# Patient Record
Sex: MALE | Race: WHITE | NOT HISPANIC OR LATINO | Employment: OTHER | URBAN - METROPOLITAN AREA
[De-identification: names, ages, dates, MRNs, and addresses within clinical notes are randomized per-mention and may not be internally consistent; named-entity substitution may affect disease eponyms.]

---

## 2022-07-17 ENCOUNTER — TRANSFERRED RECORDS (OUTPATIENT)
Dept: HEALTH INFORMATION MANAGEMENT | Facility: CLINIC | Age: 52
End: 2022-07-17

## 2022-07-17 ENCOUNTER — MEDICAL CORRESPONDENCE (OUTPATIENT)
Dept: HEALTH INFORMATION MANAGEMENT | Facility: CLINIC | Age: 52
End: 2022-07-17

## 2022-07-18 ENCOUNTER — TRANSFERRED RECORDS (OUTPATIENT)
Dept: HEALTH INFORMATION MANAGEMENT | Facility: CLINIC | Age: 52
End: 2022-07-18

## 2022-07-18 ENCOUNTER — HOSPITAL ENCOUNTER (INPATIENT)
Facility: CLINIC | Age: 52
LOS: 4 days | Discharge: HOME | End: 2022-07-22
Attending: NEUROLOGICAL SURGERY | Admitting: NEUROLOGICAL SURGERY
Payer: COMMERCIAL

## 2022-07-18 DIAGNOSIS — D49.6 BRAIN TUMOR (H): Primary | ICD-10-CM

## 2022-07-18 LAB
ABO/RH(D): NORMAL
ALT SERPL-CCNC: 18 IU/L (ref 7–52)
ANTIBODY SCREEN: NEGATIVE
APTT PPP: 28 SECONDS (ref 22–38)
AST SERPL-CCNC: 18 IU/L (ref 13–39)
CREATININE (EXTERNAL): 0.9 MG/DL (ref 0.7–1.3)
GFR ESTIMATED (EXTERNAL): 88 ML/MIN/1.73M2
GFR ESTIMATED (IF AFRICAN AMERICAN) (EXTERNAL): >90 ML/MIN/1.73M2
GLUCOSE (EXTERNAL): 97 MG/DL (ref 70–105)
INR (EXTERNAL): 1.1 (ref 0.8–1.1)
INR PPP: 1.04 (ref 0.85–1.15)
POTASSIUM (EXTERNAL): 3.9 MEQ/L (ref 3.5–5.1)
SARS-COV-2 RNA RESP QL NAA+PROBE: NEGATIVE
SPECIMEN EXPIRATION DATE: NORMAL
TSH SERPL-ACNC: 2.43 MCIU/ML (ref 0.45–5.33)

## 2022-07-18 PROCEDURE — 85730 THROMBOPLASTIN TIME PARTIAL: CPT | Performed by: STUDENT IN AN ORGANIZED HEALTH CARE EDUCATION/TRAINING PROGRAM

## 2022-07-18 PROCEDURE — 36415 COLL VENOUS BLD VENIPUNCTURE: CPT | Performed by: STUDENT IN AN ORGANIZED HEALTH CARE EDUCATION/TRAINING PROGRAM

## 2022-07-18 PROCEDURE — 250N000013 HC RX MED GY IP 250 OP 250 PS 637: Performed by: STUDENT IN AN ORGANIZED HEALTH CARE EDUCATION/TRAINING PROGRAM

## 2022-07-18 PROCEDURE — 250N000011 HC RX IP 250 OP 636: Performed by: STUDENT IN AN ORGANIZED HEALTH CARE EDUCATION/TRAINING PROGRAM

## 2022-07-18 PROCEDURE — U0003 INFECTIOUS AGENT DETECTION BY NUCLEIC ACID (DNA OR RNA); SEVERE ACUTE RESPIRATORY SYNDROME CORONAVIRUS 2 (SARS-COV-2) (CORONAVIRUS DISEASE [COVID-19]), AMPLIFIED PROBE TECHNIQUE, MAKING USE OF HIGH THROUGHPUT TECHNOLOGIES AS DESCRIBED BY CMS-2020-01-R: HCPCS | Performed by: STUDENT IN AN ORGANIZED HEALTH CARE EDUCATION/TRAINING PROGRAM

## 2022-07-18 PROCEDURE — 120N000002 HC R&B MED SURG/OB UMMC

## 2022-07-18 PROCEDURE — 85610 PROTHROMBIN TIME: CPT | Performed by: STUDENT IN AN ORGANIZED HEALTH CARE EDUCATION/TRAINING PROGRAM

## 2022-07-18 PROCEDURE — 86850 RBC ANTIBODY SCREEN: CPT | Performed by: STUDENT IN AN ORGANIZED HEALTH CARE EDUCATION/TRAINING PROGRAM

## 2022-07-18 RX ORDER — LEVETIRACETAM 500 MG/1
1000 TABLET ORAL 2 TIMES DAILY
Status: DISCONTINUED | OUTPATIENT
Start: 2022-07-18 | End: 2022-07-22 | Stop reason: HOSPADM

## 2022-07-18 RX ORDER — FAMOTIDINE 10 MG
10 TABLET ORAL 2 TIMES DAILY
Status: DISCONTINUED | OUTPATIENT
Start: 2022-07-18 | End: 2022-07-22 | Stop reason: HOSPADM

## 2022-07-18 RX ORDER — DEXAMETHASONE 4 MG/1
4 TABLET ORAL EVERY 8 HOURS SCHEDULED
Status: DISCONTINUED | OUTPATIENT
Start: 2022-07-18 | End: 2022-07-21

## 2022-07-18 RX ADMIN — FAMOTIDINE 10 MG: 10 TABLET, FILM COATED ORAL at 22:11

## 2022-07-18 RX ADMIN — LEVETIRACETAM 1000 MG: 500 TABLET, FILM COATED ORAL at 22:11

## 2022-07-18 RX ADMIN — DEXAMETHASONE 4 MG: 4 TABLET ORAL at 22:11

## 2022-07-18 ASSESSMENT — VISUAL ACUITY: OU: BASELINE

## 2022-07-18 ASSESSMENT — ACTIVITIES OF DAILY LIVING (ADL): ADLS_ACUITY_SCORE: 20

## 2022-07-19 ENCOUNTER — APPOINTMENT (OUTPATIENT)
Dept: MRI IMAGING | Facility: CLINIC | Age: 52
End: 2022-07-19
Attending: NEUROLOGICAL SURGERY
Payer: COMMERCIAL

## 2022-07-19 ENCOUNTER — APPOINTMENT (OUTPATIENT)
Dept: MRI IMAGING | Facility: CLINIC | Age: 52
End: 2022-07-19
Attending: STUDENT IN AN ORGANIZED HEALTH CARE EDUCATION/TRAINING PROGRAM
Payer: COMMERCIAL

## 2022-07-19 ENCOUNTER — ANESTHESIA EVENT (OUTPATIENT)
Dept: SURGERY | Facility: CLINIC | Age: 52
End: 2022-07-19
Payer: COMMERCIAL

## 2022-07-19 LAB
ANION GAP SERPL CALCULATED.3IONS-SCNC: 8 MMOL/L (ref 7–15)
BUN SERPL-MCNC: 17.2 MG/DL (ref 6–20)
CALCIUM SERPL-MCNC: 9 MG/DL (ref 8.6–10)
CHLORIDE SERPL-SCNC: 106 MMOL/L (ref 98–107)
CREAT SERPL-MCNC: 0.82 MG/DL (ref 0.67–1.17)
DEPRECATED HCO3 PLAS-SCNC: 23 MMOL/L (ref 22–29)
ERYTHROCYTE [DISTWIDTH] IN BLOOD BY AUTOMATED COUNT: 13.1 % (ref 10–15)
GFR SERPL CREATININE-BSD FRML MDRD: >90 ML/MIN/1.73M2
GLUCOSE SERPL-MCNC: 143 MG/DL (ref 70–99)
HCT VFR BLD AUTO: 46.2 % (ref 40–53)
HGB BLD-MCNC: 15.2 G/DL (ref 13.3–17.7)
MAGNESIUM SERPL-MCNC: 2.3 MG/DL (ref 1.7–2.3)
MCH RBC QN AUTO: 29.7 PG (ref 26.5–33)
MCHC RBC AUTO-ENTMCNC: 32.9 G/DL (ref 31.5–36.5)
MCV RBC AUTO: 90 FL (ref 78–100)
PHOSPHATE SERPL-MCNC: 2.9 MG/DL (ref 2.5–4.5)
PLATELET # BLD AUTO: 264 10E3/UL (ref 150–450)
POTASSIUM SERPL-SCNC: 4.4 MMOL/L (ref 3.4–5.3)
RBC # BLD AUTO: 5.12 10E6/UL (ref 4.4–5.9)
SODIUM SERPL-SCNC: 137 MMOL/L (ref 136–145)
WBC # BLD AUTO: 10.3 10E3/UL (ref 4–11)

## 2022-07-19 PROCEDURE — 250N000013 HC RX MED GY IP 250 OP 250 PS 637: Performed by: STUDENT IN AN ORGANIZED HEALTH CARE EDUCATION/TRAINING PROGRAM

## 2022-07-19 PROCEDURE — 80048 BASIC METABOLIC PNL TOTAL CA: CPT | Performed by: STUDENT IN AN ORGANIZED HEALTH CARE EDUCATION/TRAINING PROGRAM

## 2022-07-19 PROCEDURE — 83735 ASSAY OF MAGNESIUM: CPT | Performed by: STUDENT IN AN ORGANIZED HEALTH CARE EDUCATION/TRAINING PROGRAM

## 2022-07-19 PROCEDURE — 120N000002 HC R&B MED SURG/OB UMMC

## 2022-07-19 PROCEDURE — 70553 MRI BRAIN STEM W/O & W/DYE: CPT

## 2022-07-19 PROCEDURE — 255N000002 HC RX 255 OP 636: Performed by: NEUROLOGICAL SURGERY

## 2022-07-19 PROCEDURE — 84100 ASSAY OF PHOSPHORUS: CPT | Performed by: STUDENT IN AN ORGANIZED HEALTH CARE EDUCATION/TRAINING PROGRAM

## 2022-07-19 PROCEDURE — 85027 COMPLETE CBC AUTOMATED: CPT | Performed by: STUDENT IN AN ORGANIZED HEALTH CARE EDUCATION/TRAINING PROGRAM

## 2022-07-19 PROCEDURE — 70551 MRI BRAIN STEM W/O DYE: CPT | Mod: 26 | Performed by: RADIOLOGY

## 2022-07-19 PROCEDURE — A9585 GADOBUTROL INJECTION: HCPCS | Performed by: NEUROLOGICAL SURGERY

## 2022-07-19 PROCEDURE — 70551 MRI BRAIN STEM W/O DYE: CPT

## 2022-07-19 PROCEDURE — 70553 MRI BRAIN STEM W/O & W/DYE: CPT | Mod: 26 | Performed by: RADIOLOGY

## 2022-07-19 PROCEDURE — 99232 SBSQ HOSP IP/OBS MODERATE 35: CPT | Performed by: NURSE PRACTITIONER

## 2022-07-19 PROCEDURE — 250N000011 HC RX IP 250 OP 636: Performed by: STUDENT IN AN ORGANIZED HEALTH CARE EDUCATION/TRAINING PROGRAM

## 2022-07-19 PROCEDURE — 36415 COLL VENOUS BLD VENIPUNCTURE: CPT | Performed by: STUDENT IN AN ORGANIZED HEALTH CARE EDUCATION/TRAINING PROGRAM

## 2022-07-19 RX ORDER — GADOBUTROL 604.72 MG/ML
10 INJECTION INTRAVENOUS ONCE
Status: COMPLETED | OUTPATIENT
Start: 2022-07-19 | End: 2022-07-19

## 2022-07-19 RX ADMIN — FAMOTIDINE 10 MG: 10 TABLET, FILM COATED ORAL at 11:14

## 2022-07-19 RX ADMIN — DEXAMETHASONE 4 MG: 4 TABLET ORAL at 06:02

## 2022-07-19 RX ADMIN — GADOBUTROL 10 ML: 604.72 INJECTION INTRAVENOUS at 09:45

## 2022-07-19 RX ADMIN — FAMOTIDINE 10 MG: 10 TABLET, FILM COATED ORAL at 20:49

## 2022-07-19 RX ADMIN — LEVETIRACETAM 1000 MG: 500 TABLET, FILM COATED ORAL at 20:49

## 2022-07-19 RX ADMIN — LEVETIRACETAM 1000 MG: 500 TABLET, FILM COATED ORAL at 11:14

## 2022-07-19 RX ADMIN — DEXAMETHASONE 4 MG: 4 TABLET ORAL at 14:06

## 2022-07-19 RX ADMIN — DEXAMETHASONE 4 MG: 4 TABLET ORAL at 22:12

## 2022-07-19 ASSESSMENT — VISUAL ACUITY
OU: BASELINE
OU: BASELINE

## 2022-07-19 ASSESSMENT — ACTIVITIES OF DAILY LIVING (ADL)
ADLS_ACUITY_SCORE: 20

## 2022-07-19 NOTE — PLAN OF CARE
Goal Outcome Evaluation:      Pt has been alert and oriented X4, slightly slow to respond due to pt being careful with his speech. No word finding difficulty noted today. Neuro intact. Pt has been up in room independently. Tolerated regular diet well. Had brain MRI this am, currently downstairs for follow up MRI to plan for Craniotomy tomorrow. Family members have been at the bedside, very supportive.

## 2022-07-19 NOTE — ANESTHESIA PREPROCEDURE EVALUATION
Anesthesia Pre-Procedure Evaluation    Patient: Laz Stein   MRN: [id number] : 1970        Procedure : Procedure(s):  Stealth assisted  CRANIOTOMY FOR TUMOR RESECTION          52-year-old right-handed male without significant past medical history presenting with 1 week of paraphasic errors with word substitutions, found to have a left contrast-enhancing lesion     Patient reports beginning roughly 1 week ago she began to notice he was having difficulty conveying himself and is often saying inappropriate words and/or switching words for other words that sounded similar although at different remaining.  He reports the symptoms may have been he can a few weeks earlier, however he was attributing those mistakes to old age and nobody in his life had really mentioned that this was to some degree of concern.  Last week he was on the cell phone with inability to convey himself and became very obvious.  He then subsequently talked with his wife about his symptoms he recognized that he come into the ED.  He was seen in [place] where a CT scan was done showing an expansile left parietal occipital lesion.  Subsequent MRI scan was done which showed a multiloculated/cystic large mass to Gadolinium contrast enhancement, He denies any episodes of limb shaking or numbness or paresthesia in any of his extremities.  He denies any focal weakness.  He denies any episodes of staring off, or episodes of confusion/unaccountable time.  He denies any left or right confusion.  He can identify all of his fingers.  He has no issues with additional subtraction/basic arithmetic.  He endorses subjective overall worsening of his handwriting but is able to write his name.  He denies fevers, nausea is conscious chills.    No past medical history on file.   No past surgical history on file.   No Known Allergies   Social History     Tobacco Use     Smoking status: Not on file     Smokeless tobacco: Not on file   Substance Use  Topics     Alcohol use: Not on file      Wt Readings from Last 1 Encounters:   07/19/22 84.9 kg (187 lb 3.2 oz)        Anesthesia Evaluation   Pt has had prior anesthetic. Type: General.    History of anesthetic complications  - PONV.  Severe PONV after both hip surgeries.    ROS/MED HX  ENT/Pulmonary:  - neg pulmonary ROS     Neurologic: Comment: Lesion on brain imaging in left parieto-occipital lesion      Cardiovascular:  - neg cardiovascular ROS  (-) murmur   METS/Exercise Tolerance: >4 METS    Hematologic:  - neg hematologic  ROS     Musculoskeletal:  - neg musculoskeletal ROS     GI/Hepatic:  - neg GI/hepatic ROS     Renal/Genitourinary:  - neg Renal ROS     Endo:  - neg endo ROS     Psychiatric/Substance Use:  - neg psychiatric ROS     Infectious Disease:  - neg infectious disease ROS     Malignancy:  - neg malignancy ROS     Other:            Physical Exam    Airway        Mallampati: III   TM distance: > 3 FB   Neck ROM: full   Mouth opening: > 3 cm    Respiratory Devices and Support         Dental  no notable dental history         Cardiovascular          Rhythm and rate: regular and normal (-) no murmur    Pulmonary           breath sounds clear to auscultation           OUTSIDE LABS:  CBC:   Lab Results   Component Value Date    WBC 10.3 07/19/2022    HGB 15.2 07/19/2022    HCT 46.2 07/19/2022     07/19/2022     BMP:   Lab Results   Component Value Date     07/19/2022    POTASSIUM 4.4 07/19/2022    CHLORIDE 106 07/19/2022    CO2 23 07/19/2022    BUN 17.2 07/19/2022    CR 0.82 07/19/2022     (H) 07/19/2022     COAGS:   Lab Results   Component Value Date    PTT 28 07/18/2022    INR 1.04 07/18/2022     POC: No results found for: BGM, HCG, HCGS  HEPATIC: No results found for: ALBUMIN, PROTTOTAL, ALT, AST, GGT, ALKPHOS, BILITOTAL, BILIDIRECT, TOMAS  OTHER:   Lab Results   Component Value Date    ABDIEL 9.0 07/19/2022    PHOS 2.9 07/19/2022    MAG 2.3 07/19/2022       Anesthesia Plan    ASA  Status:  3   NPO Status:  NPO Appropriate    Anesthesia Type: General.     - Airway: ETT   Induction: Intravenous.   Maintenance: TIVA.   Techniques and Equipment:     - Lines/Monitors: 2nd IV, Arterial Line     - Blood: T&S     Consents    Anesthesia Plan(s) and associated risks, benefits, and realistic alternatives discussed. Questions answered and patient/representative(s) expressed understanding.    - Discussed:     - Discussed with:  Patient      - Extended Intubation/Ventilatory Support Discussed: Yes.      - Patient is DNR/DNI Status: No    Use of blood products discussed: Yes.     - Discussed with: Patient.     - Consented: consented to blood products            Reason for refusal: other.     Postoperative Care    Pain management: IV analgesics, Oral pain medications.   PONV prophylaxis: Ondansetron (or other 5HT-3), Dexamethasone or Solumedrol, Aprepitant     Comments:    Other Comments: Patient seen and examined.  Risks, benefits and alternatives to GETA discussed.  Questions answered and patient wishes to proceed            Yahir Combs MD

## 2022-07-19 NOTE — H&P
[facility]         NEUROSURGERY H&P:      Chief complaint: Left parietal contrast-enhancing mass      HPI:    52-year-old right-handed male without significant past medical history presenting with 1 week of paraphasic errors with word substitutions, found to have a left contrast-enhancing lesion    Patient reports beginning roughly 1 week ago she began to notice he was having difficulty conveying himself and is often saying inappropriate words and/or switching words for other words that sounded similar although at different remaining.  He reports the symptoms may have been he can a few weeks earlier, however he was attributing those mistakes to old age and nobody in his life had really mentioned that this was to some degree of concern.  Last week he was on the cell phone with inability to convey himself and became very obvious.  He then subsequently talked with his wife about his symptoms he recognized that he come into the ED.  He was seen in [place] where a CT scan was done showing an expansile left parietal occipital lesion.  Subsequent MRI scan was done which showed a multiloculated/cystic large mass to Gadolinium contrast enhancement, He denies any episodes of limb shaking or numbness or paresthesia in any of his extremities.  He denies any focal weakness.  He denies any episodes of staring off, or episodes of confusion/unaccountable time.  He denies any left or right confusion.  He can identify all of his fingers.  He has no issues with additional subtraction/basic arithmetic.  He endorses subjective overall worsening of his handwriting but is able to write his name.  He denies fevers, nausea is conscious chills.        No recent fevers, chills, nausea, vomiting, chest pain, shortness of breath, and denies headaches, weakness, LOC, numbness/weakness/paresthesias in extremities, changes in sensation, taste, smell, nor trouble speaking or other neurologic  symptoms.    PAST MEDICAL HISTORY: No past medical history on file.    PAST SURGICAL HISTORY: No past surgical history on file.    FAMILY HISTORY: No family history on file.    SOCIAL HISTORY:   Social History     Tobacco Use     Smoking status: Not on file     Smokeless tobacco: Not on file   Substance Use Topics     Alcohol use: Not on file       MEDICATIONS:  No current outpatient medications on file.       Allergies:  No Known Allergies    ROS: 10 point ROS of systems including Constitutional, Eyes, Respiratory, Cardiovascular, Gastroenterology, Genitourinary, Integumentary, Muscularskeletal, Psychiatric were all negative except for pertinent positives noted in my HPI.    Physical exam:   Blood pressure 127/78, pulse 71, temperature 97.8  F (36.6  C), temperature source Oral, resp. rate 16, SpO2 93 %.  General: Not in acute distress, appearing stated age, laying in bed  HEENT: Normocephalic,  PULM: Breathing comfortably on room air  MSK: Unremarkable    NEUROLOGIC:  -- Awake; Alert; oriented x 3  -- Follows commands briskly  -- +repetition, calculation, and naming  -- Speech fluent, spontaneous. No aphasia or dysarthria.  Mild word substitutions and hesitations.  -- no gaze preference. No apparent hemineglect.  Cranial Nerves:  -- visual fields full to confrontation, PERRL 3-2mm bilat and brisk, extraocular movements intact  -- face symmetrical, tongue midline  -- sensory V1-V3 intact bilaterally  -- palate elevates symmetrically, uvula midline  -- hearing grossly intact bilat  -- Trapezii 5/5 strength bilat symmetric  -- Cerebellar: Finger nose finger without dysmetria, intact rapid alternating motions bilaterally    Motor:  Normal bulk / tone; no tremor, rigidity, or bradykinesia.  No muscle wasting or fasciculations  No Pronator Drift     Delt Bi Tri Hand Flexion/  Extension Iliopsoas Quadriceps Hamstrings Tibialis Anterior Gastroc    C5 C6 C7 C8/T1 L2 L3 L4-S1 L4 S1   R 5 5 5 5 5 5 5 5 5   L 5 5 5 5 5 5 5 5  5     Sensory:  intact to LT x 4 extremities      Reflexes: no clonus       Bi Tri BR Michael Pat Ach Bab     C5-6 C7-8 C6 UMN L2-4 S1 UMN   R 2+ 2+ 2+ Norm 2+ 2+ Norm   L 2+ 2+ 2+ Norm 2+ 2+ Norm      Gait: deferred       IMAGING:  No results found for this or any previous visit (from the past 24 hour(s)).        LABS:   Last Comprehensive Metabolic Panel:  Sodium   Date Value Ref Range Status   07/19/2022 137 136 - 145 mmol/L Final     Potassium   Date Value Ref Range Status   07/19/2022 4.4 3.4 - 5.3 mmol/L Final     Chloride   Date Value Ref Range Status   07/19/2022 106 98 - 107 mmol/L Final     Carbon Dioxide (CO2)   Date Value Ref Range Status   07/19/2022 23 22 - 29 mmol/L Final     Anion Gap   Date Value Ref Range Status   07/19/2022 8 7 - 15 mmol/L Final     Glucose   Date Value Ref Range Status   07/19/2022 143 (H) 70 - 99 mg/dL Final     Urea Nitrogen   Date Value Ref Range Status   07/19/2022 17.2 6.0 - 20.0 mg/dL Final     Creatinine   Date Value Ref Range Status   07/19/2022 0.82 0.67 - 1.17 mg/dL Final     GFR Estimate   Date Value Ref Range Status   07/19/2022 >90 >60 mL/min/1.73m2 Final     Comment:     Effective December 21, 2021 eGFRcr in adults is calculated using the 2021 CKD-EPI creatinine equation which includes age and gender (Ramírez carey al., NEJ, DOI: 10.1056/CLLFto7324918)     Calcium   Date Value Ref Range Status   07/19/2022 9.0 8.6 - 10.0 mg/dL Final     Lab Results   Component Value Date    WBC 10.3 07/19/2022     Lab Results   Component Value Date    RBC 5.12 07/19/2022     Lab Results   Component Value Date    HGB 15.2 07/19/2022     Lab Results   Component Value Date    HCT 46.2 07/19/2022     Lab Results   Component Value Date    MCV 90 07/19/2022     Lab Results   Component Value Date    MCH 29.7 07/19/2022     Lab Results   Component Value Date    MCHC 32.9 07/19/2022     Lab Results   Component Value Date    RDW 13.1 07/19/2022     Lab Results   Component Value Date      07/19/2022     INR   Date Value Ref Range Status   07/18/2022 1.04 0.85 - 1.15 Final    a  aPTT   Date Value Ref Range Status   07/18/2022 28 22 - 38 Seconds Final      @Fibrinogen1@    ASSESSMENT: 52-year-old right-handed male with a large left-sided expansile lesion showing contrast-enhancement.  Exam notable for paraphasic errors.  Differential includes primary CNS neoplasm likely high-grade, metastasis, CNS lymphoma, etc.    Clinically Significant Risk Factors Present on Admission                  # Cerebral edema     RECOMMENDATIONS:  No neurosurgical intervention indicated at this time   MRI brain with and without contrast brain tumor protocol with fiducials  Decadron 4 mg every 8 hours for cerebral edema  Keppra 1 g twice daily for seizure prophylaxis  Tentative plan for OR tomorrow 7/20/2022  Okay for every 4 hour neuro exams  Okay for normotension  Regular diet for now  HOB > 30 degrees  Famotidine for GI prophylaxis while on steroids        Audrey Barajas MD  Neurosurgery Resident, PGY-3    The patient was discussed with Dr. Cheung, neurosurgery chief resident, and Dr. Reese, neurosurgery staff.

## 2022-07-19 NOTE — PHARMACY-ADMISSION MEDICATION HISTORY
Admission Medication History Completed by Pharmacy    See [facility] Admission Navigator for allergy information, preferred outpatient pharmacy, prior to admission medications and immunization status.     Medication History Sources:     Patient Laz Stein    Patient's Spouse Tracey    Changes made to PTA medication list (reason):    Added: None    Deleted: None    Changed: None    Additional Information:    None    Prior to Admission medications    Not on File       Date completed: 07/19/22    Medication history completed by: Sawyer Gonzáles RPH

## 2022-07-19 NOTE — PLAN OF CARE
Arrived from: Outside hosptial ED  Belongings/meds: cell phone, wedding ring   2 RN Skin Assessment Completed by: Power     Non-intact findings documented (yes/no/NA): Small abrasion to R shin    Status: Presented to OSH with expressive aphasia and hand discoordination, imaging showed L parietal mass. Transferred for workup and surgery.   Vitals: VSS on RA  Neuros: Infrequent innapropriate words and word finding difficulty. Otherwise intact.   IV: PIV SL  Labs/Electrolytes: WNL  Resp/trach: LS clear  Diet: Regular, good appetite   Bowel status: LBM 7/18  : Voiding without difficulty   Pain: Denies   Activity: SBA GB   Social: Updating family independently   Plan: High priority MRI this morning, surgery Wednesday 7/20. Continue keppra and decadron.   Updates this shift: MRI checklist sent. Pt unable to remove wedding ring, states that it is titanium.

## 2022-07-19 NOTE — PROGRESS NOTES
[facility], [place]   Neurosurgery Progress Note:    Date of service: 7/19/2022    Assessment: Laz Stein is a 52 year old male who was transferred from outside hospital with one week history of aphasia, found to have new left parietal contrast enhancing lesion.       Clinically Significant Risk Factors Present on Admission                           Plan:  - Neuro checks/vital signs: every 4 hours  - SBP: <160  - Pain control: PRN Oxycodone, tylenol  - Steroids/Keppra: Decadron 4mg every 8 hours, Keppra 1000mg BID  - Activity: up with assistance  - Diet: Regular; NPO @ midnight in anticipation for surgery on 7/20/22  - DVT prophylaxis: SCDs  - Imaging:  MRI brain w/wo contrast planned for this morning   - PT/OT: deferred until after surgery  - Follow-up plan upon discharge:  TBD  - Disposition:  TBD            RAVEN Meraz, CNP  7/19/2022  Department of Neurosurgery  Pager: [phone]    I have spent a total of 25 minutes total time counseling, coordination, and chart review, over 50% of the time was spent in direct patient care.       Interval History:  Patient admitted last evening after imaging revealed a new left parietal lesion.   Patient states the word finding difficulty he had been experiencing has improved, denies headache, vision changes, sensory changes, difficulty writing or math.           Objective:   Temp:  [95.6  F (35.3  C)-97.8  F (36.6  C)] 97.8  F (36.6  C)  Pulse:  [67-71] 71  Resp:  [16] 16  BP: (124-142)/(63-78) 127/78  SpO2:  [93 %-97 %] 93 %  No intake/output data recorded.    Gen: Appears comfortable, NAD  Neurologic:  - Alert & Oriented to person, place, time, and situation  - Follows commands briskly  - Speech fluent, spontaneous. No aphasia or dysarthria.  - No gaze preference. No apparent hemineglect.  - PERRL, EOMI  - Strong eye closure, jaw clench, and cheek puff  - Face symmetric with sensation intact to light touch  - Palate elevates  symmetrically, uvula midline, tongue protrudes midline  - Trapezii and sternocleidomastoid muscles 5/5 bilaterally  - No pronator drift     Del Tr Bi WE WF Gr   R 5 5 5 5 5 5   L 5 5 5 5 5 5    HF KE KF DF PF EHL   R 5 5 5 5 5 5   L 5 5 5 5 5 5     Reflexes 2+ throughout    Sensation intact and symmetric to light touch throughout    LABS  Recent Labs   Lab Test 07/19/22  0709   WBC 10.3   HGB 15.2   MCV 90          Recent Labs   Lab Test 07/19/22  0709      POTASSIUM 4.4   CHLORIDE 106   CO2 23   BUN 17.2   CR 0.82   ANIONGAP 8   ABDIEL 9.0   *       IMAGING    No results found for this or any previous visit (from the past 24 hour(s)).

## 2022-07-20 ENCOUNTER — ANESTHESIA (OUTPATIENT)
Dept: SURGERY | Facility: CLINIC | Age: 52
End: 2022-07-20
Payer: COMMERCIAL

## 2022-07-20 LAB
ABO/RH(D): NORMAL
ANION GAP SERPL CALCULATED.3IONS-SCNC: 11 MMOL/L (ref 7–15)
ANTIBODY SCREEN: NEGATIVE
APTT PPP: 27 SECONDS (ref 22–38)
BASE EXCESS BLDA CALC-SCNC: -1.2 MMOL/L (ref -9.6–2)
BASE EXCESS BLDA CALC-SCNC: -1.8 MMOL/L (ref -9.6–2)
BASE EXCESS BLDA CALC-SCNC: -3.8 MMOL/L (ref -9.6–2)
BUN SERPL-MCNC: 19.2 MG/DL (ref 6–20)
CA-I BLD-MCNC: 4 MG/DL (ref 4.4–5.2)
CA-I BLD-MCNC: 4.4 MG/DL (ref 4.4–5.2)
CA-I BLD-MCNC: 4.4 MG/DL (ref 4.4–5.2)
CALCIUM SERPL-MCNC: 8.9 MG/DL (ref 8.6–10)
CHLORIDE SERPL-SCNC: 104 MMOL/L (ref 98–107)
CREAT SERPL-MCNC: 0.81 MG/DL (ref 0.67–1.17)
DEPRECATED HCO3 PLAS-SCNC: 22 MMOL/L (ref 22–29)
ERYTHROCYTE [DISTWIDTH] IN BLOOD BY AUTOMATED COUNT: 13.2 % (ref 10–15)
GFR SERPL CREATININE-BSD FRML MDRD: >90 ML/MIN/1.73M2
GLUCOSE BLD-MCNC: 104 MG/DL (ref 70–99)
GLUCOSE BLD-MCNC: 116 MG/DL (ref 70–99)
GLUCOSE BLD-MCNC: 117 MG/DL (ref 70–99)
GLUCOSE BLDC GLUCOMTR-MCNC: 116 MG/DL (ref 70–99)
GLUCOSE SERPL-MCNC: 145 MG/DL (ref 70–99)
HCO3 BLDA-SCNC: 21 MMOL/L (ref 21–28)
HCO3 BLDA-SCNC: 25 MMOL/L (ref 21–28)
HCO3 BLDA-SCNC: 25 MMOL/L (ref 21–28)
HCT VFR BLD AUTO: 44.4 % (ref 40–53)
HGB BLD-MCNC: 10.9 G/DL (ref 13.3–17.7)
HGB BLD-MCNC: 12.2 G/DL (ref 13.3–17.7)
HGB BLD-MCNC: 12.4 G/DL (ref 13.3–17.7)
HGB BLD-MCNC: 14.2 G/DL (ref 13.3–17.7)
INR PPP: 1.11 (ref 0.85–1.15)
LACTATE BLD-SCNC: 0.9 MMOL/L
LACTATE BLD-SCNC: 1 MMOL/L
LACTATE BLD-SCNC: 1 MMOL/L
MAGNESIUM SERPL-MCNC: 2.4 MG/DL (ref 1.7–2.3)
MCH RBC QN AUTO: 29 PG (ref 26.5–33)
MCHC RBC AUTO-ENTMCNC: 32 G/DL (ref 31.5–36.5)
MCV RBC AUTO: 91 FL (ref 78–100)
O2/TOTAL GAS SETTING VFR VENT: 40 %
O2/TOTAL GAS SETTING VFR VENT: 45 %
O2/TOTAL GAS SETTING VFR VENT: 45 %
PCO2 BLDA: 37 MM HG (ref 35–45)
PCO2 BLDA: 48 MM HG (ref 35–45)
PCO2 BLDA: 50 MM HG (ref 35–45)
PH BLDA: 7.31 [PH] (ref 7.35–7.45)
PH BLDA: 7.33 [PH] (ref 7.35–7.45)
PH BLDA: 7.37 [PH] (ref 7.35–7.45)
PHOSPHATE SERPL-MCNC: 2.6 MG/DL (ref 2.5–4.5)
PLATELET # BLD AUTO: 246 10E3/UL (ref 150–450)
PO2 BLDA: 199 MM HG (ref 80–105)
PO2 BLDA: 241 MM HG (ref 80–105)
PO2 BLDA: 249 MM HG (ref 80–105)
POTASSIUM BLD-SCNC: 3.3 MMOL/L (ref 3.5–5)
POTASSIUM BLD-SCNC: 4 MMOL/L (ref 3.5–5)
POTASSIUM BLD-SCNC: 4.1 MMOL/L (ref 3.5–5)
POTASSIUM SERPL-SCNC: 4.2 MMOL/L (ref 3.4–5.3)
RBC # BLD AUTO: 4.89 10E6/UL (ref 4.4–5.9)
SODIUM BLD-SCNC: 131 MMOL/L (ref 133–144)
SODIUM BLD-SCNC: 132 MMOL/L (ref 133–144)
SODIUM BLD-SCNC: 135 MMOL/L (ref 133–144)
SODIUM SERPL-SCNC: 137 MMOL/L (ref 136–145)
SPECIMEN EXPIRATION DATE: NORMAL
WBC # BLD AUTO: 15.5 10E3/UL (ref 4–11)

## 2022-07-20 PROCEDURE — 258N000003 HC RX IP 258 OP 636: Performed by: ANESTHESIOLOGY

## 2022-07-20 PROCEDURE — 370N000017 HC ANESTHESIA TECHNICAL FEE, PER MIN: Performed by: NEUROLOGICAL SURGERY

## 2022-07-20 PROCEDURE — 250N000011 HC RX IP 250 OP 636: Performed by: NURSE ANESTHETIST, CERTIFIED REGISTERED

## 2022-07-20 PROCEDURE — 250N000009 HC RX 250: Performed by: NEUROLOGICAL SURGERY

## 2022-07-20 PROCEDURE — 250N000013 HC RX MED GY IP 250 OP 250 PS 637: Performed by: STUDENT IN AN ORGANIZED HEALTH CARE EDUCATION/TRAINING PROGRAM

## 2022-07-20 PROCEDURE — 250N000009 HC RX 250: Performed by: STUDENT IN AN ORGANIZED HEALTH CARE EDUCATION/TRAINING PROGRAM

## 2022-07-20 PROCEDURE — C1713 ANCHOR/SCREW BN/BN,TIS/BN: HCPCS | Performed by: NEUROLOGICAL SURGERY

## 2022-07-20 PROCEDURE — 278N000051 HC OR IMPLANT GENERAL: Performed by: NEUROLOGICAL SURGERY

## 2022-07-20 PROCEDURE — 80048 BASIC METABOLIC PNL TOTAL CA: CPT | Performed by: STUDENT IN AN ORGANIZED HEALTH CARE EDUCATION/TRAINING PROGRAM

## 2022-07-20 PROCEDURE — 250N000011 HC RX IP 250 OP 636: Performed by: ANESTHESIOLOGY

## 2022-07-20 PROCEDURE — 86923 COMPATIBILITY TEST ELECTRIC: CPT | Performed by: NURSE PRACTITIONER

## 2022-07-20 PROCEDURE — 258N000003 HC RX IP 258 OP 636

## 2022-07-20 PROCEDURE — 120N000002 HC R&B MED SURG/OB UMMC

## 2022-07-20 PROCEDURE — 61510 CRNEC TREPH EXC BRN TUM STTL: CPT | Mod: GC | Performed by: NEUROLOGICAL SURGERY

## 2022-07-20 PROCEDURE — 88331 PATH CONSLTJ SURG 1 BLK 1SPC: CPT | Mod: 26 | Performed by: SPECIALIST

## 2022-07-20 PROCEDURE — 258N000003 HC RX IP 258 OP 636: Performed by: NURSE ANESTHETIST, CERTIFIED REGISTERED

## 2022-07-20 PROCEDURE — 88307 TISSUE EXAM BY PATHOLOGIST: CPT | Mod: 26 | Performed by: SPECIALIST

## 2022-07-20 PROCEDURE — 88341 IMHCHEM/IMCYTCHM EA ADD ANTB: CPT | Mod: 26 | Performed by: SPECIALIST

## 2022-07-20 PROCEDURE — 250N000025 HC SEVOFLURANE, PER MIN: Performed by: NEUROLOGICAL SURGERY

## 2022-07-20 PROCEDURE — 86901 BLOOD TYPING SEROLOGIC RH(D): CPT

## 2022-07-20 PROCEDURE — 272N000001 HC OR GENERAL SUPPLY STERILE: Performed by: NEUROLOGICAL SURGERY

## 2022-07-20 PROCEDURE — 8E09XBH COMPUTER ASSISTED PROCEDURE OF HEAD AND NECK REGION, WITH MAGNETIC RESONANCE IMAGING: ICD-10-PCS | Performed by: NEUROLOGICAL SURGERY

## 2022-07-20 PROCEDURE — 61781 SCAN PROC CRANIAL INTRA: CPT | Performed by: NEUROLOGICAL SURGERY

## 2022-07-20 PROCEDURE — 82803 BLOOD GASES ANY COMBINATION: CPT

## 2022-07-20 PROCEDURE — 8E090EM FLUORESCENCE GUIDED PROCEDURE OF HEAD AND NECK REGION USING AMINOLEVULINIC ACID, OPEN APPROACH: ICD-10-PCS | Performed by: NEUROLOGICAL SURGERY

## 2022-07-20 PROCEDURE — 99232 SBSQ HOSP IP/OBS MODERATE 35: CPT | Mod: 57 | Performed by: NURSE PRACTITIONER

## 2022-07-20 PROCEDURE — 999N000141 HC STATISTIC PRE-PROCEDURE NURSING ASSESSMENT: Performed by: NEUROLOGICAL SURGERY

## 2022-07-20 PROCEDURE — 250N000009 HC RX 250: Performed by: ANESTHESIOLOGY

## 2022-07-20 PROCEDURE — 85730 THROMBOPLASTIN TIME PARTIAL: CPT | Performed by: NURSE PRACTITIONER

## 2022-07-20 PROCEDURE — 88360 TUMOR IMMUNOHISTOCHEM/MANUAL: CPT | Mod: 26 | Performed by: SPECIALIST

## 2022-07-20 PROCEDURE — 69990 MICROSURGERY ADD-ON: CPT | Mod: 59 | Performed by: NEUROLOGICAL SURGERY

## 2022-07-20 PROCEDURE — 360N000079 HC SURGERY LEVEL 6, PER MIN: Performed by: NEUROLOGICAL SURGERY

## 2022-07-20 PROCEDURE — 250N000009 HC RX 250: Performed by: NURSE ANESTHETIST, CERTIFIED REGISTERED

## 2022-07-20 PROCEDURE — 88331 PATH CONSLTJ SURG 1 BLK 1SPC: CPT | Mod: TC | Performed by: NEUROLOGICAL SURGERY

## 2022-07-20 PROCEDURE — 83735 ASSAY OF MAGNESIUM: CPT | Performed by: STUDENT IN AN ORGANIZED HEALTH CARE EDUCATION/TRAINING PROGRAM

## 2022-07-20 PROCEDURE — 00B70ZZ EXCISION OF CEREBRAL HEMISPHERE, OPEN APPROACH: ICD-10-PCS | Performed by: NEUROLOGICAL SURGERY

## 2022-07-20 PROCEDURE — 84100 ASSAY OF PHOSPHORUS: CPT | Performed by: STUDENT IN AN ORGANIZED HEALTH CARE EDUCATION/TRAINING PROGRAM

## 2022-07-20 PROCEDURE — 250N000013 HC RX MED GY IP 250 OP 250 PS 637

## 2022-07-20 PROCEDURE — 36415 COLL VENOUS BLD VENIPUNCTURE: CPT

## 2022-07-20 PROCEDURE — 88342 IMHCHEM/IMCYTCHM 1ST ANTB: CPT | Mod: 26 | Performed by: SPECIALIST

## 2022-07-20 PROCEDURE — 710N000011 HC RECOVERY PHASE 1, LEVEL 3, PER MIN: Performed by: NEUROLOGICAL SURGERY

## 2022-07-20 PROCEDURE — 250N000011 HC RX IP 250 OP 636: Performed by: STUDENT IN AN ORGANIZED HEALTH CARE EDUCATION/TRAINING PROGRAM

## 2022-07-20 PROCEDURE — 250N000011 HC RX IP 250 OP 636: Performed by: NURSE PRACTITIONER

## 2022-07-20 PROCEDURE — 85610 PROTHROMBIN TIME: CPT | Performed by: NURSE PRACTITIONER

## 2022-07-20 PROCEDURE — 250N000012 HC RX MED GY IP 250 OP 636 PS 637: Performed by: ANESTHESIOLOGY

## 2022-07-20 PROCEDURE — 85027 COMPLETE CBC AUTOMATED: CPT | Performed by: STUDENT IN AN ORGANIZED HEALTH CARE EDUCATION/TRAINING PROGRAM

## 2022-07-20 DEVICE — DURAGEN® DURAL GRAFT MATRIX 5 PK 3X3 DOM
Type: IMPLANTABLE DEVICE | Site: BRAIN | Status: FUNCTIONAL
Brand: DURAGEN®

## 2022-07-20 DEVICE — IMP PLATE SYN STR DOG BONE LOW PROFILE 2H TI 421.502
Type: IMPLANTABLE DEVICE | Site: CRANIAL | Status: NON-FUNCTIONAL
Removed: 2023-04-19

## 2022-07-20 DEVICE — IMP BUR HOLE COVER 24MM LOW PROFILE TI 421.528
Type: IMPLANTABLE DEVICE | Site: CRANIAL | Status: NON-FUNCTIONAL
Removed: 2023-04-19

## 2022-07-20 DEVICE — IMP SCR SYN MATRIX LOW PRO 1.5X04MM SELF DRILL 04.503.104.01
Type: IMPLANTABLE DEVICE | Site: CRANIAL | Status: NON-FUNCTIONAL
Removed: 2023-04-19

## 2022-07-20 RX ORDER — OXYCODONE HYDROCHLORIDE 5 MG/1
5 TABLET ORAL EVERY 4 HOURS PRN
Status: DISCONTINUED | OUTPATIENT
Start: 2022-07-20 | End: 2022-07-21 | Stop reason: HOSPADM

## 2022-07-20 RX ORDER — LABETALOL HYDROCHLORIDE 5 MG/ML
10 INJECTION, SOLUTION INTRAVENOUS
Status: DISCONTINUED | OUTPATIENT
Start: 2022-07-20 | End: 2022-07-21 | Stop reason: HOSPADM

## 2022-07-20 RX ORDER — HYDRALAZINE HYDROCHLORIDE 20 MG/ML
2.5-5 INJECTION INTRAMUSCULAR; INTRAVENOUS EVERY 10 MIN PRN
Status: DISCONTINUED | OUTPATIENT
Start: 2022-07-20 | End: 2022-07-21 | Stop reason: HOSPADM

## 2022-07-20 RX ORDER — ONDANSETRON 4 MG/1
4 TABLET, ORALLY DISINTEGRATING ORAL EVERY 30 MIN PRN
Status: DISCONTINUED | OUTPATIENT
Start: 2022-07-20 | End: 2022-07-21 | Stop reason: HOSPADM

## 2022-07-20 RX ORDER — LIDOCAINE 40 MG/G
CREAM TOPICAL
Status: DISCONTINUED | OUTPATIENT
Start: 2022-07-20 | End: 2022-07-21

## 2022-07-20 RX ORDER — SODIUM CHLORIDE, SODIUM LACTATE, POTASSIUM CHLORIDE, CALCIUM CHLORIDE 600; 310; 30; 20 MG/100ML; MG/100ML; MG/100ML; MG/100ML
INJECTION, SOLUTION INTRAVENOUS CONTINUOUS
Status: DISCONTINUED | OUTPATIENT
Start: 2022-07-20 | End: 2022-07-21 | Stop reason: HOSPADM

## 2022-07-20 RX ORDER — HYDROMORPHONE HYDROCHLORIDE 1 MG/ML
0.2 INJECTION, SOLUTION INTRAMUSCULAR; INTRAVENOUS; SUBCUTANEOUS EVERY 5 MIN PRN
Status: DISCONTINUED | OUTPATIENT
Start: 2022-07-20 | End: 2022-07-21 | Stop reason: HOSPADM

## 2022-07-20 RX ORDER — LIDOCAINE HYDROCHLORIDE 20 MG/ML
INJECTION, SOLUTION INFILTRATION; PERINEURAL PRN
Status: DISCONTINUED | OUTPATIENT
Start: 2022-07-20 | End: 2022-07-20

## 2022-07-20 RX ORDER — MANNITOL 20 G/100ML
INJECTION, SOLUTION INTRAVENOUS PRN
Status: DISCONTINUED | OUTPATIENT
Start: 2022-07-20 | End: 2022-07-20

## 2022-07-20 RX ORDER — DEXAMETHASONE SODIUM PHOSPHATE 4 MG/ML
INJECTION, SOLUTION INTRA-ARTICULAR; INTRALESIONAL; INTRAMUSCULAR; INTRAVENOUS; SOFT TISSUE PRN
Status: DISCONTINUED | OUTPATIENT
Start: 2022-07-20 | End: 2022-07-20

## 2022-07-20 RX ORDER — EPHEDRINE SULFATE 50 MG/ML
INJECTION, SOLUTION INTRAMUSCULAR; INTRAVENOUS; SUBCUTANEOUS PRN
Status: DISCONTINUED | OUTPATIENT
Start: 2022-07-20 | End: 2022-07-20

## 2022-07-20 RX ORDER — SODIUM CHLORIDE, SODIUM LACTATE, POTASSIUM CHLORIDE, CALCIUM CHLORIDE 600; 310; 30; 20 MG/100ML; MG/100ML; MG/100ML; MG/100ML
INJECTION, SOLUTION INTRAVENOUS CONTINUOUS PRN
Status: DISCONTINUED | OUTPATIENT
Start: 2022-07-20 | End: 2022-07-20

## 2022-07-20 RX ORDER — FENTANYL CITRATE 50 UG/ML
INJECTION, SOLUTION INTRAMUSCULAR; INTRAVENOUS PRN
Status: DISCONTINUED | OUTPATIENT
Start: 2022-07-20 | End: 2022-07-20

## 2022-07-20 RX ORDER — PROPOFOL 10 MG/ML
INJECTION, EMULSION INTRAVENOUS PRN
Status: DISCONTINUED | OUTPATIENT
Start: 2022-07-20 | End: 2022-07-20

## 2022-07-20 RX ORDER — HYDROMORPHONE HYDROCHLORIDE 1 MG/ML
0.4 INJECTION, SOLUTION INTRAMUSCULAR; INTRAVENOUS; SUBCUTANEOUS EVERY 5 MIN PRN
Status: DISCONTINUED | OUTPATIENT
Start: 2022-07-20 | End: 2022-07-21 | Stop reason: HOSPADM

## 2022-07-20 RX ORDER — FENTANYL CITRATE 50 UG/ML
50 INJECTION, SOLUTION INTRAMUSCULAR; INTRAVENOUS EVERY 5 MIN PRN
Status: DISCONTINUED | OUTPATIENT
Start: 2022-07-20 | End: 2022-07-21 | Stop reason: HOSPADM

## 2022-07-20 RX ORDER — BUPIVACAINE HYDROCHLORIDE AND EPINEPHRINE 2.5; 5 MG/ML; UG/ML
INJECTION, SOLUTION INFILTRATION; PERINEURAL PRN
Status: DISCONTINUED | OUTPATIENT
Start: 2022-07-20 | End: 2022-07-20 | Stop reason: HOSPADM

## 2022-07-20 RX ORDER — CEFAZOLIN SODIUM 2 G/100ML
2 INJECTION, SOLUTION INTRAVENOUS SEE ADMIN INSTRUCTIONS
Status: DISCONTINUED | OUTPATIENT
Start: 2022-07-20 | End: 2022-07-21

## 2022-07-20 RX ORDER — ACETAMINOPHEN 500 MG
1000 TABLET ORAL ONCE
Status: DISCONTINUED | OUTPATIENT
Start: 2022-07-20 | End: 2022-07-21 | Stop reason: HOSPADM

## 2022-07-20 RX ORDER — ACETAMINOPHEN 500 MG
1000 TABLET ORAL ONCE
Status: DISCONTINUED | OUTPATIENT
Start: 2022-07-20 | End: 2022-07-20

## 2022-07-20 RX ORDER — DIMENHYDRINATE 50 MG/ML
25 INJECTION, SOLUTION INTRAMUSCULAR; INTRAVENOUS
Status: DISCONTINUED | OUTPATIENT
Start: 2022-07-20 | End: 2022-07-21 | Stop reason: HOSPADM

## 2022-07-20 RX ORDER — APREPITANT 40 MG/1
40 CAPSULE ORAL ONCE
Status: DISCONTINUED | OUTPATIENT
Start: 2022-07-20 | End: 2022-07-20

## 2022-07-20 RX ORDER — ALBUTEROL SULFATE 0.83 MG/ML
2.5 SOLUTION RESPIRATORY (INHALATION) EVERY 4 HOURS PRN
Status: DISCONTINUED | OUTPATIENT
Start: 2022-07-20 | End: 2022-07-21 | Stop reason: HOSPADM

## 2022-07-20 RX ORDER — ONDANSETRON 2 MG/ML
INJECTION INTRAMUSCULAR; INTRAVENOUS PRN
Status: DISCONTINUED | OUTPATIENT
Start: 2022-07-20 | End: 2022-07-20

## 2022-07-20 RX ORDER — CEFAZOLIN SODIUM 2 G/100ML
2 INJECTION, SOLUTION INTRAVENOUS
Status: DISCONTINUED | OUTPATIENT
Start: 2022-07-20 | End: 2022-07-20

## 2022-07-20 RX ORDER — MEPERIDINE HYDROCHLORIDE 25 MG/ML
12.5 INJECTION INTRAMUSCULAR; INTRAVENOUS; SUBCUTANEOUS
Status: DISCONTINUED | OUTPATIENT
Start: 2022-07-20 | End: 2022-07-21 | Stop reason: HOSPADM

## 2022-07-20 RX ORDER — SODIUM CHLORIDE 9 MG/ML
INJECTION, SOLUTION INTRAVENOUS CONTINUOUS
Status: DISCONTINUED | OUTPATIENT
Start: 2022-07-20 | End: 2022-07-21 | Stop reason: HOSPADM

## 2022-07-20 RX ORDER — SODIUM CHLORIDE, SODIUM LACTATE, POTASSIUM CHLORIDE, CALCIUM CHLORIDE 600; 310; 30; 20 MG/100ML; MG/100ML; MG/100ML; MG/100ML
INJECTION, SOLUTION INTRAVENOUS CONTINUOUS
Status: DISCONTINUED | OUTPATIENT
Start: 2022-07-20 | End: 2022-07-21

## 2022-07-20 RX ORDER — PROPOFOL 10 MG/ML
INJECTION, EMULSION INTRAVENOUS CONTINUOUS PRN
Status: DISCONTINUED | OUTPATIENT
Start: 2022-07-20 | End: 2022-07-20

## 2022-07-20 RX ORDER — FENTANYL CITRATE 50 UG/ML
25 INJECTION, SOLUTION INTRAMUSCULAR; INTRAVENOUS
Status: DISCONTINUED | OUTPATIENT
Start: 2022-07-20 | End: 2022-07-21 | Stop reason: HOSPADM

## 2022-07-20 RX ORDER — ONDANSETRON 2 MG/ML
4 INJECTION INTRAMUSCULAR; INTRAVENOUS EVERY 30 MIN PRN
Status: DISCONTINUED | OUTPATIENT
Start: 2022-07-20 | End: 2022-07-21 | Stop reason: HOSPADM

## 2022-07-20 RX ORDER — PHENYLEPHRINE HCL IN 0.9% NACL 50MG/250ML
.1-6 PLASTIC BAG, INJECTION (ML) INTRAVENOUS CONTINUOUS
Status: DISCONTINUED | OUTPATIENT
Start: 2022-07-20 | End: 2022-07-20 | Stop reason: HOSPADM

## 2022-07-20 RX ORDER — ESMOLOL HYDROCHLORIDE 10 MG/ML
INJECTION INTRAVENOUS PRN
Status: DISCONTINUED | OUTPATIENT
Start: 2022-07-20 | End: 2022-07-20

## 2022-07-20 RX ADMIN — PHENYLEPHRINE HYDROCHLORIDE 100 MCG: 10 INJECTION INTRAVENOUS at 20:14

## 2022-07-20 RX ADMIN — ROCURONIUM BROMIDE 50 MG: 50 INJECTION, SOLUTION INTRAVENOUS at 19:23

## 2022-07-20 RX ADMIN — FAMOTIDINE 10 MG: 10 TABLET, FILM COATED ORAL at 07:39

## 2022-07-20 RX ADMIN — DEXAMETHASONE SODIUM PHOSPHATE 10 MG: 4 INJECTION, SOLUTION INTRA-ARTICULAR; INTRALESIONAL; INTRAMUSCULAR; INTRAVENOUS; SOFT TISSUE at 20:11

## 2022-07-20 RX ADMIN — REMIFENTANIL HYDROCHLORIDE 0.08 MCG/KG/MIN: 1 INJECTION, POWDER, LYOPHILIZED, FOR SOLUTION INTRAVENOUS at 19:28

## 2022-07-20 RX ADMIN — PROPOFOL 100 MG: 10 INJECTION, EMULSION INTRAVENOUS at 19:46

## 2022-07-20 RX ADMIN — ONDANSETRON 4 MG: 2 INJECTION INTRAMUSCULAR; INTRAVENOUS at 22:19

## 2022-07-20 RX ADMIN — DEXAMETHASONE 4 MG: 4 TABLET ORAL at 15:06

## 2022-07-20 RX ADMIN — PROPOFOL 150 MCG/KG/MIN: 10 INJECTION, EMULSION INTRAVENOUS at 19:23

## 2022-07-20 RX ADMIN — FENTANYL CITRATE 100 MCG: 50 INJECTION, SOLUTION INTRAMUSCULAR; INTRAVENOUS at 19:20

## 2022-07-20 RX ADMIN — SUGAMMADEX 200 MG: 100 INJECTION, SOLUTION INTRAVENOUS at 22:53

## 2022-07-20 RX ADMIN — Medication 5 MG: at 21:44

## 2022-07-20 RX ADMIN — APREPITANT 40 MG: 40 CAPSULE ORAL at 18:22

## 2022-07-20 RX ADMIN — SODIUM CHLORIDE, POTASSIUM CHLORIDE, SODIUM LACTATE AND CALCIUM CHLORIDE: 600; 310; 30; 20 INJECTION, SOLUTION INTRAVENOUS at 23:20

## 2022-07-20 RX ADMIN — DEXAMETHASONE 4 MG: 4 TABLET ORAL at 05:56

## 2022-07-20 RX ADMIN — ONDANSETRON 4 MG: 2 INJECTION INTRAMUSCULAR; INTRAVENOUS at 19:19

## 2022-07-20 RX ADMIN — PROPOFOL 20 MG: 10 INJECTION, EMULSION INTRAVENOUS at 21:48

## 2022-07-20 RX ADMIN — SODIUM CHLORIDE 20 ML/HR: 234 INJECTION INTRAMUSCULAR; INTRAVENOUS; SUBCUTANEOUS at 20:58

## 2022-07-20 RX ADMIN — LEVETIRACETAM 1000 MG: 500 TABLET, FILM COATED ORAL at 07:39

## 2022-07-20 RX ADMIN — LIDOCAINE HYDROCHLORIDE 100 MG: 20 INJECTION, SOLUTION INFILTRATION; PERINEURAL at 19:21

## 2022-07-20 RX ADMIN — PROPOFOL 200 MG: 10 INJECTION, EMULSION INTRAVENOUS at 19:23

## 2022-07-20 RX ADMIN — SODIUM CHLORIDE, POTASSIUM CHLORIDE, SODIUM LACTATE AND CALCIUM CHLORIDE: 600; 310; 30; 20 INJECTION, SOLUTION INTRAVENOUS at 19:12

## 2022-07-20 RX ADMIN — PHENYLEPHRINE HYDROCHLORIDE 100 MCG: 10 INJECTION INTRAVENOUS at 20:13

## 2022-07-20 RX ADMIN — MANNITOL 175 ML: 20 INJECTION, SOLUTION INTRAVENOUS at 20:14

## 2022-07-20 RX ADMIN — SODIUM CHLORIDE, POTASSIUM CHLORIDE, SODIUM LACTATE AND CALCIUM CHLORIDE: 600; 310; 30; 20 INJECTION, SOLUTION INTRAVENOUS at 19:46

## 2022-07-20 RX ADMIN — Medication 2 G: at 19:30

## 2022-07-20 RX ADMIN — SODIUM CHLORIDE: 9 INJECTION, SOLUTION INTRAVENOUS at 23:57

## 2022-07-20 RX ADMIN — ACETAMINOPHEN 1000 MG: 500 TABLET ORAL at 16:54

## 2022-07-20 RX ADMIN — ESMOLOL HYDROCHLORIDE 50 MG: 10 INJECTION, SOLUTION INTRAVENOUS at 19:46

## 2022-07-20 ASSESSMENT — ACTIVITIES OF DAILY LIVING (ADL)
ADLS_ACUITY_SCORE: 20

## 2022-07-20 NOTE — PLAN OF CARE
Status: transferred from outside hospital with one week history of aphasia, found to have new left parietal contrast enhancing lesion.   Vitals: VSS  Neuros: WFD intermittently.   IV: PIV sl   Labs/Electrolytes: WNL  Diet: Regular. NPO at MN  Bowel status: LBM PTA  : voiding spontaneously   Skin: Intact.   Pain: Denies  Activity: up ad ambrose. Frequent walks around unit.   Social: Wife visted this evening.   Plan: Planning for craniotomy tomorrow in afternoon. Surgical scrub completed.

## 2022-07-20 NOTE — PLAN OF CARE
Pt admitted for new left parietal contrast enhancing lesion. VSS on RA. Neuros intact with exception to intermittent WFD and inappropriate words substituting, not observed this shift. Pre-op scrub completed last evening and this afternoon. NPO since midnight with exception to AM meds. Ambulating independently. SL. Denies pain. Pt send down to pre-op at 1630 with all belongings with family.

## 2022-07-20 NOTE — PROGRESS NOTES
[facility], [place]   Neurosurgery Progress Note:    Date of service: 7/20/2022    Assessment: Laz Stein is a 52 year old male who was transferred from outside hospital with one week history of aphasia, found to have new left parietal contrast enhancing lesion.       Clinically Significant Risk Factors Present on Admission             # Overweight: Estimated body mass index is 25.39 kg/m  as calculated from the following:    Height as of this encounter: 1.829 m (6').    Weight as of this encounter: 84.9 kg (187 lb 3.2 oz).            Plan:  - Neuro checks/vital signs: every 4 hours  - SBP: <160  - Pain control: PRN Oxycodone, tylenol  - Steroids/Keppra: Decadron 4mg every 8 hours, Keppra 1000mg BID  - Activity: up with assistance  - Diet: NPO in anticipation of surgery   - DVT prophylaxis: SCDs  - Imaging:  MRI brain for pre-operative planning completed  - PT/OT: deferred until after surgery  - OR for tumor resection with Dr. Tanner Reese today           Annabel Dumont, RAVEN, CNP  7/19/2022  Department of Neurosurgery  Pager: [phone]    I have spent a total of 25 minutes total time counseling, coordination, and chart review, over 50% of the time was spent in direct patient care.       Interval History:  Patient doing well since admission.  Underwent MRI imaging yesterday.  Currently denies headache, word finding issues, weakness, nausea or vomiting.  NPO for anticipation of surgery today.           Objective:   Temp:  [95.9  F (35.5  C)-97.6  F (36.4  C)] 96  F (35.6  C)  Pulse:  [66-85] 66  Resp:  [14-18] 14  BP: (116-153)/(69-81) 132/79  SpO2:  [93 %-98 %] 97 %  No intake/output data recorded.    Gen: Appears comfortable, NAD  Neurologic:  - Alert & Oriented to person, place, time, and situation  - Follows commands briskly  - Speech fluent, spontaneous. No aphasia or dysarthria.  - No gaze preference. No apparent hemineglect.  - PERRL, EOMI  - Strong eye closure, jaw clench,  and cheek puff  - Face symmetric with sensation intact to light touch  - Palate elevates symmetrically, uvula midline, tongue protrudes midline  - Trapezii and sternocleidomastoid muscles 5/5 bilaterally  - No pronator drift     Del Tr Bi WE WF Gr   R 5 5 5 5 5 5   L 5 5 5 5 5 5    HF KE KF DF PF EHL   R 5 5 5 5 5 5   L 5 5 5 5 5 5     Reflexes 2+ throughout    Sensation intact and symmetric to light touch throughout    LABS  Recent Labs   Lab Test 07/20/22  0338 07/19/22  0709   WBC 15.5* 10.3   HGB 14.2 15.2   MCV 91 90    264       Recent Labs   Lab Test 07/20/22  0338 07/19/22  0709    137   POTASSIUM 4.2 4.4   CHLORIDE 104 106   CO2 22 23   BUN 19.2 17.2   CR 0.81 0.82   ANIONGAP 11 8   ABDIEL 8.9 9.0   * 143*       IMAGING    No results found for this or any previous visit (from the past 24 hour(s)).

## 2022-07-20 NOTE — PLAN OF CARE
Status: transferred from outside hospital with one week history of aphasia, found to have new left parietal contrast enhancing lesion.   Vitals: VSS  Neuros: Infrequent innapropriate words and word finding difficulty. Otherwise intact.  IV: PIV SL  Labs/Electrolytes: WBC 15.5 up from 10.3 yesterday.   Diet: NPO since midnight  Bowel status: LBM 7/18  : voiding spontaneously   Skin: Intact. Fiducials in place.   Pain: Denies  Activity: up ad ambrose  Plan: Planning for craniotomy this afternoon. Surgical scrub completed.

## 2022-07-21 ENCOUNTER — APPOINTMENT (OUTPATIENT)
Dept: CT IMAGING | Facility: CLINIC | Age: 52
End: 2022-07-21
Attending: STUDENT IN AN ORGANIZED HEALTH CARE EDUCATION/TRAINING PROGRAM
Payer: COMMERCIAL

## 2022-07-21 ENCOUNTER — APPOINTMENT (OUTPATIENT)
Dept: MRI IMAGING | Facility: CLINIC | Age: 52
End: 2022-07-21
Attending: NEUROLOGICAL SURGERY
Payer: COMMERCIAL

## 2022-07-21 ENCOUNTER — APPOINTMENT (OUTPATIENT)
Dept: GENERAL RADIOLOGY | Facility: CLINIC | Age: 52
End: 2022-07-21
Attending: NEUROLOGICAL SURGERY
Payer: COMMERCIAL

## 2022-07-21 ENCOUNTER — APPOINTMENT (OUTPATIENT)
Dept: OCCUPATIONAL THERAPY | Facility: CLINIC | Age: 52
End: 2022-07-21
Attending: STUDENT IN AN ORGANIZED HEALTH CARE EDUCATION/TRAINING PROGRAM
Payer: COMMERCIAL

## 2022-07-21 ENCOUNTER — APPOINTMENT (OUTPATIENT)
Dept: CT IMAGING | Facility: CLINIC | Age: 52
End: 2022-07-21
Attending: NEUROLOGICAL SURGERY
Payer: COMMERCIAL

## 2022-07-21 LAB
ANION GAP SERPL CALCULATED.3IONS-SCNC: 9 MMOL/L (ref 7–15)
BLD PROD TYP BPU: NORMAL
BLOOD COMPONENT TYPE: NORMAL
BUN SERPL-MCNC: 17.8 MG/DL (ref 6–20)
CALCIUM SERPL-MCNC: 8.5 MG/DL (ref 8.6–10)
CHLORIDE SERPL-SCNC: 105 MMOL/L (ref 98–107)
CK SERPL-CCNC: 112 U/L (ref 39–308)
CODING SYSTEM: NORMAL
CREAT SERPL-MCNC: 0.84 MG/DL (ref 0.67–1.17)
CROSSMATCH: NORMAL
DEPRECATED HCO3 PLAS-SCNC: 25 MMOL/L (ref 22–29)
ERYTHROCYTE [DISTWIDTH] IN BLOOD BY AUTOMATED COUNT: 13.4 % (ref 10–15)
GFR SERPL CREATININE-BSD FRML MDRD: >90 ML/MIN/1.73M2
GLUCOSE BLDC GLUCOMTR-MCNC: 129 MG/DL (ref 70–99)
GLUCOSE BLDC GLUCOMTR-MCNC: 134 MG/DL (ref 70–99)
GLUCOSE SERPL-MCNC: 138 MG/DL (ref 70–99)
HCT VFR BLD AUTO: 40.4 % (ref 40–53)
HGB BLD-MCNC: 13 G/DL (ref 13.3–17.7)
MAGNESIUM SERPL-MCNC: 2 MG/DL (ref 1.7–2.3)
MCH RBC QN AUTO: 29.4 PG (ref 26.5–33)
MCHC RBC AUTO-ENTMCNC: 32.2 G/DL (ref 31.5–36.5)
MCV RBC AUTO: 91 FL (ref 78–100)
PHOSPHATE SERPL-MCNC: 3.2 MG/DL (ref 2.5–4.5)
PLATELET # BLD AUTO: 270 10E3/UL (ref 150–450)
POTASSIUM SERPL-SCNC: 4 MMOL/L (ref 3.4–5.3)
RBC # BLD AUTO: 4.42 10E6/UL (ref 4.4–5.9)
SODIUM SERPL-SCNC: 139 MMOL/L (ref 136–145)
TROPONIN T SERPL HS-MCNC: <6 NG/L
UNIT ABO/RH: NORMAL
UNIT NUMBER: NORMAL
UNIT STATUS: NORMAL
UNIT TYPE ISBT: 6200
WBC # BLD AUTO: 18.9 10E3/UL (ref 4–11)

## 2022-07-21 PROCEDURE — 97530 THERAPEUTIC ACTIVITIES: CPT | Mod: GO

## 2022-07-21 PROCEDURE — 70450 CT HEAD/BRAIN W/O DYE: CPT | Mod: 26 | Performed by: RADIOLOGY

## 2022-07-21 PROCEDURE — A9585 GADOBUTROL INJECTION: HCPCS | Performed by: NEUROLOGICAL SURGERY

## 2022-07-21 PROCEDURE — 99207 PR SC NO CHARGE VISIT: CPT | Performed by: PSYCHIATRY & NEUROLOGY

## 2022-07-21 PROCEDURE — 255N000002 HC RX 255 OP 636: Performed by: NEUROLOGICAL SURGERY

## 2022-07-21 PROCEDURE — 71045 X-RAY EXAM CHEST 1 VIEW: CPT | Mod: 26 | Performed by: RADIOLOGY

## 2022-07-21 PROCEDURE — 99291 CRITICAL CARE FIRST HOUR: CPT | Performed by: PSYCHIATRY & NEUROLOGY

## 2022-07-21 PROCEDURE — 93010 ELECTROCARDIOGRAM REPORT: CPT | Performed by: INTERNAL MEDICINE

## 2022-07-21 PROCEDURE — 84484 ASSAY OF TROPONIN QUANT: CPT

## 2022-07-21 PROCEDURE — 82550 ASSAY OF CK (CPK): CPT

## 2022-07-21 PROCEDURE — 250N000013 HC RX MED GY IP 250 OP 250 PS 637: Performed by: NEUROLOGICAL SURGERY

## 2022-07-21 PROCEDURE — 70553 MRI BRAIN STEM W/O & W/DYE: CPT | Mod: 26 | Performed by: STUDENT IN AN ORGANIZED HEALTH CARE EDUCATION/TRAINING PROGRAM

## 2022-07-21 PROCEDURE — 80048 BASIC METABOLIC PNL TOTAL CA: CPT | Performed by: STUDENT IN AN ORGANIZED HEALTH CARE EDUCATION/TRAINING PROGRAM

## 2022-07-21 PROCEDURE — 999N000157 HC STATISTIC RCP TIME EA 10 MIN

## 2022-07-21 PROCEDURE — 70450 CT HEAD/BRAIN W/O DYE: CPT | Mod: 76

## 2022-07-21 PROCEDURE — 36415 COLL VENOUS BLD VENIPUNCTURE: CPT

## 2022-07-21 PROCEDURE — 70553 MRI BRAIN STEM W/O & W/DYE: CPT

## 2022-07-21 PROCEDURE — 84100 ASSAY OF PHOSPHORUS: CPT | Performed by: STUDENT IN AN ORGANIZED HEALTH CARE EDUCATION/TRAINING PROGRAM

## 2022-07-21 PROCEDURE — 70450 CT HEAD/BRAIN W/O DYE: CPT | Mod: 77

## 2022-07-21 PROCEDURE — 85027 COMPLETE CBC AUTOMATED: CPT | Performed by: STUDENT IN AN ORGANIZED HEALTH CARE EDUCATION/TRAINING PROGRAM

## 2022-07-21 PROCEDURE — 83735 ASSAY OF MAGNESIUM: CPT | Performed by: STUDENT IN AN ORGANIZED HEALTH CARE EDUCATION/TRAINING PROGRAM

## 2022-07-21 PROCEDURE — 97165 OT EVAL LOW COMPLEX 30 MIN: CPT | Mod: GO

## 2022-07-21 PROCEDURE — 93005 ELECTROCARDIOGRAM TRACING: CPT

## 2022-07-21 PROCEDURE — 71045 X-RAY EXAM CHEST 1 VIEW: CPT

## 2022-07-21 PROCEDURE — 258N000003 HC RX IP 258 OP 636: Performed by: STUDENT IN AN ORGANIZED HEALTH CARE EDUCATION/TRAINING PROGRAM

## 2022-07-21 PROCEDURE — 999N000015 HC STATISTIC ARTERIAL MONITORING DAILY

## 2022-07-21 PROCEDURE — 999N000147 HC STATISTIC PT IP EVAL DEFER: Performed by: PHYSICAL THERAPIST

## 2022-07-21 PROCEDURE — 200N000002 HC R&B ICU UMMC

## 2022-07-21 PROCEDURE — 250N000013 HC RX MED GY IP 250 OP 250 PS 637: Performed by: STUDENT IN AN ORGANIZED HEALTH CARE EDUCATION/TRAINING PROGRAM

## 2022-07-21 PROCEDURE — 70450 CT HEAD/BRAIN W/O DYE: CPT

## 2022-07-21 PROCEDURE — 250N000013 HC RX MED GY IP 250 OP 250 PS 637: Performed by: NURSE PRACTITIONER

## 2022-07-21 PROCEDURE — 250N000011 HC RX IP 250 OP 636: Performed by: STUDENT IN AN ORGANIZED HEALTH CARE EDUCATION/TRAINING PROGRAM

## 2022-07-21 RX ORDER — NALOXONE HYDROCHLORIDE 0.4 MG/ML
0.4 INJECTION, SOLUTION INTRAMUSCULAR; INTRAVENOUS; SUBCUTANEOUS
Status: DISCONTINUED | OUTPATIENT
Start: 2022-07-21 | End: 2022-07-22 | Stop reason: HOSPADM

## 2022-07-21 RX ORDER — NALOXONE HYDROCHLORIDE 0.4 MG/ML
0.2 INJECTION, SOLUTION INTRAMUSCULAR; INTRAVENOUS; SUBCUTANEOUS
Status: DISCONTINUED | OUTPATIENT
Start: 2022-07-21 | End: 2022-07-22 | Stop reason: HOSPADM

## 2022-07-21 RX ORDER — HYDROMORPHONE HCL IN WATER/PF 6 MG/30 ML
0.4 PATIENT CONTROLLED ANALGESIA SYRINGE INTRAVENOUS
Status: DISCONTINUED | OUTPATIENT
Start: 2022-07-21 | End: 2022-07-21

## 2022-07-21 RX ORDER — LABETALOL HYDROCHLORIDE 5 MG/ML
10-40 INJECTION, SOLUTION INTRAVENOUS EVERY 10 MIN PRN
Status: DISCONTINUED | OUTPATIENT
Start: 2022-07-21 | End: 2022-07-22 | Stop reason: HOSPADM

## 2022-07-21 RX ORDER — OXYCODONE HYDROCHLORIDE 5 MG/1
5 TABLET ORAL EVERY 4 HOURS PRN
Status: DISCONTINUED | OUTPATIENT
Start: 2022-07-21 | End: 2022-07-21

## 2022-07-21 RX ORDER — SODIUM CHLORIDE 9 MG/ML
INJECTION, SOLUTION INTRAVENOUS CONTINUOUS
Status: ACTIVE | OUTPATIENT
Start: 2022-07-21 | End: 2022-07-21

## 2022-07-21 RX ORDER — CEFAZOLIN SODIUM 1 G/3ML
1 INJECTION, POWDER, FOR SOLUTION INTRAMUSCULAR; INTRAVENOUS EVERY 8 HOURS
Status: COMPLETED | OUTPATIENT
Start: 2022-07-21 | End: 2022-07-21

## 2022-07-21 RX ORDER — ACETAMINOPHEN 325 MG/1
650 TABLET ORAL EVERY 4 HOURS PRN
Status: DISCONTINUED | OUTPATIENT
Start: 2022-07-23 | End: 2022-07-22 | Stop reason: HOSPADM

## 2022-07-21 RX ORDER — DIAZEPAM 2 MG
2 TABLET ORAL AT BEDTIME
Status: DISCONTINUED | OUTPATIENT
Start: 2022-07-21 | End: 2022-07-21

## 2022-07-21 RX ORDER — HYDROMORPHONE HCL IN WATER/PF 6 MG/30 ML
0.2 PATIENT CONTROLLED ANALGESIA SYRINGE INTRAVENOUS
Status: DISCONTINUED | OUTPATIENT
Start: 2022-07-21 | End: 2022-07-21

## 2022-07-21 RX ORDER — ONDANSETRON 2 MG/ML
4 INJECTION INTRAMUSCULAR; INTRAVENOUS EVERY 6 HOURS PRN
Status: DISCONTINUED | OUTPATIENT
Start: 2022-07-21 | End: 2022-07-22 | Stop reason: HOSPADM

## 2022-07-21 RX ORDER — LIDOCAINE 40 MG/G
CREAM TOPICAL
Status: DISCONTINUED | OUTPATIENT
Start: 2022-07-21 | End: 2022-07-22 | Stop reason: HOSPADM

## 2022-07-21 RX ORDER — DEXAMETHASONE 2 MG/1
2 TABLET ORAL EVERY 8 HOURS SCHEDULED
Status: DISCONTINUED | OUTPATIENT
Start: 2022-07-23 | End: 2022-07-22 | Stop reason: HOSPADM

## 2022-07-21 RX ORDER — ACETAMINOPHEN 325 MG/1
975 TABLET ORAL EVERY 8 HOURS
Status: DISCONTINUED | OUTPATIENT
Start: 2022-07-21 | End: 2022-07-22 | Stop reason: HOSPADM

## 2022-07-21 RX ORDER — HYDRALAZINE HYDROCHLORIDE 20 MG/ML
10-20 INJECTION INTRAMUSCULAR; INTRAVENOUS EVERY 30 MIN PRN
Status: DISCONTINUED | OUTPATIENT
Start: 2022-07-21 | End: 2022-07-22 | Stop reason: HOSPADM

## 2022-07-21 RX ORDER — AMOXICILLIN 250 MG
1 CAPSULE ORAL 2 TIMES DAILY
Status: DISCONTINUED | OUTPATIENT
Start: 2022-07-21 | End: 2022-07-22 | Stop reason: HOSPADM

## 2022-07-21 RX ORDER — DEXAMETHASONE 1 MG
1 TABLET ORAL EVERY 8 HOURS SCHEDULED
Status: DISCONTINUED | OUTPATIENT
Start: 2022-07-24 | End: 2022-07-22 | Stop reason: HOSPADM

## 2022-07-21 RX ORDER — DEXAMETHASONE 4 MG/1
4 TABLET ORAL EVERY 8 HOURS SCHEDULED
Status: COMPLETED | OUTPATIENT
Start: 2022-07-21 | End: 2022-07-21

## 2022-07-21 RX ORDER — BISACODYL 10 MG
10 SUPPOSITORY, RECTAL RECTAL DAILY PRN
Status: DISCONTINUED | OUTPATIENT
Start: 2022-07-21 | End: 2022-07-22 | Stop reason: HOSPADM

## 2022-07-21 RX ORDER — ONDANSETRON 4 MG/1
4 TABLET, ORALLY DISINTEGRATING ORAL EVERY 6 HOURS PRN
Status: DISCONTINUED | OUTPATIENT
Start: 2022-07-21 | End: 2022-07-22 | Stop reason: HOSPADM

## 2022-07-21 RX ORDER — OXYCODONE HYDROCHLORIDE 10 MG/1
10 TABLET ORAL EVERY 4 HOURS PRN
Status: DISCONTINUED | OUTPATIENT
Start: 2022-07-21 | End: 2022-07-21

## 2022-07-21 RX ORDER — MAGNESIUM OXIDE 400 MG/1
400 TABLET ORAL EVERY 4 HOURS
Status: COMPLETED | OUTPATIENT
Start: 2022-07-21 | End: 2022-07-21

## 2022-07-21 RX ORDER — METHOCARBAMOL 500 MG/1
500 TABLET, FILM COATED ORAL 4 TIMES DAILY PRN
Status: DISCONTINUED | OUTPATIENT
Start: 2022-07-21 | End: 2022-07-22 | Stop reason: HOSPADM

## 2022-07-21 RX ORDER — PROCHLORPERAZINE MALEATE 10 MG
10 TABLET ORAL EVERY 6 HOURS PRN
Status: DISCONTINUED | OUTPATIENT
Start: 2022-07-21 | End: 2022-07-22 | Stop reason: HOSPADM

## 2022-07-21 RX ORDER — GADOBUTROL 604.72 MG/ML
10 INJECTION INTRAVENOUS ONCE
Status: COMPLETED | OUTPATIENT
Start: 2022-07-21 | End: 2022-07-21

## 2022-07-21 RX ORDER — DEXAMETHASONE 1.5 MG/1
3 TABLET ORAL EVERY 8 HOURS SCHEDULED
Status: DISCONTINUED | OUTPATIENT
Start: 2022-07-22 | End: 2022-07-22 | Stop reason: HOSPADM

## 2022-07-21 RX ORDER — POLYETHYLENE GLYCOL 3350 17 G/17G
17 POWDER, FOR SOLUTION ORAL DAILY
Status: DISCONTINUED | OUTPATIENT
Start: 2022-07-21 | End: 2022-07-22 | Stop reason: HOSPADM

## 2022-07-21 RX ADMIN — LEVETIRACETAM 1000 MG: 500 TABLET, FILM COATED ORAL at 19:57

## 2022-07-21 RX ADMIN — DEXAMETHASONE 4 MG: 4 TABLET ORAL at 13:48

## 2022-07-21 RX ADMIN — FAMOTIDINE 10 MG: 10 TABLET, FILM COATED ORAL at 19:57

## 2022-07-21 RX ADMIN — ACETAMINOPHEN 975 MG: 325 TABLET, FILM COATED ORAL at 01:31

## 2022-07-21 RX ADMIN — DEXAMETHASONE 4 MG: 4 TABLET ORAL at 06:25

## 2022-07-21 RX ADMIN — LEVETIRACETAM 1000 MG: 500 TABLET, FILM COATED ORAL at 07:51

## 2022-07-21 RX ADMIN — CEFAZOLIN 1 G: 1 INJECTION, POWDER, FOR SOLUTION INTRAMUSCULAR; INTRAVENOUS at 09:34

## 2022-07-21 RX ADMIN — METHOCARBAMOL 500 MG: 500 TABLET ORAL at 11:01

## 2022-07-21 RX ADMIN — SENNOSIDES AND DOCUSATE SODIUM 1 TABLET: 8.6; 5 TABLET ORAL at 07:51

## 2022-07-21 RX ADMIN — METHOCARBAMOL 500 MG: 500 TABLET ORAL at 16:29

## 2022-07-21 RX ADMIN — HYDRALAZINE HYDROCHLORIDE 10 MG: 20 INJECTION INTRAMUSCULAR; INTRAVENOUS at 06:25

## 2022-07-21 RX ADMIN — Medication 400 MG: at 06:25

## 2022-07-21 RX ADMIN — Medication 400 MG: at 09:34

## 2022-07-21 RX ADMIN — CEFAZOLIN 1 G: 1 INJECTION, POWDER, FOR SOLUTION INTRAMUSCULAR; INTRAVENOUS at 17:19

## 2022-07-21 RX ADMIN — FAMOTIDINE 10 MG: 10 TABLET, FILM COATED ORAL at 07:51

## 2022-07-21 RX ADMIN — LABETALOL HYDROCHLORIDE 20 MG: 5 INJECTION, SOLUTION INTRAVENOUS at 17:17

## 2022-07-21 RX ADMIN — GADOBUTROL 8.5 ML: 604.72 INJECTION INTRAVENOUS at 19:28

## 2022-07-21 RX ADMIN — SODIUM CHLORIDE: 9 INJECTION, SOLUTION INTRAVENOUS at 01:23

## 2022-07-21 RX ADMIN — HYDRALAZINE HYDROCHLORIDE 20 MG: 20 INJECTION INTRAMUSCULAR; INTRAVENOUS at 16:07

## 2022-07-21 RX ADMIN — SENNOSIDES AND DOCUSATE SODIUM 1 TABLET: 8.6; 5 TABLET ORAL at 19:57

## 2022-07-21 RX ADMIN — DEXAMETHASONE 4 MG: 4 TABLET ORAL at 22:13

## 2022-07-21 RX ADMIN — HYDRALAZINE HYDROCHLORIDE 20 MG: 20 INJECTION INTRAMUSCULAR; INTRAVENOUS at 10:30

## 2022-07-21 RX ADMIN — CEFAZOLIN 1 G: 1 INJECTION, POWDER, FOR SOLUTION INTRAMUSCULAR; INTRAVENOUS at 01:37

## 2022-07-21 RX ADMIN — Medication 2.5 MG: at 22:13

## 2022-07-21 RX ADMIN — Medication 50 MG: at 07:55

## 2022-07-21 RX ADMIN — Medication 50 MG: at 13:48

## 2022-07-21 RX ADMIN — ACETAMINOPHEN 975 MG: 325 TABLET, FILM COATED ORAL at 09:34

## 2022-07-21 RX ADMIN — ACETAMINOPHEN 975 MG: 325 TABLET, FILM COATED ORAL at 17:56

## 2022-07-21 ASSESSMENT — ACTIVITIES OF DAILY LIVING (ADL)
ADLS_ACUITY_SCORE: 30
ADLS_ACUITY_SCORE: 22
ADLS_ACUITY_SCORE: 30
ADLS_ACUITY_SCORE: 20
ADLS_ACUITY_SCORE: 22
PREVIOUS_RESPONSIBILITIES: YARDWORK;MEDICATION MANAGEMENT;FINANCES;DRIVING;WORK
ADLS_ACUITY_SCORE: 30
ADLS_ACUITY_SCORE: 30
ADLS_ACUITY_SCORE: 22

## 2022-07-21 NOTE — OR NURSING
Pt to [facility] from the OR. Pt sedated and groggy but able to follow commands. Pt having difficulty expressing himself and was only oriented to self at first but by the time he went to SICU he was oriented x4 and answering questions more appropriately though he still repeated the same words and used some words incorrectly and was difficult to understand at times. Other neurological assessments also improved while in [facility]. Pt strong, COLEMAN's (see charting). Pt met [facility] transfer criteria. Pt brought to CT and then 4A. Neuro assessment done with 4A RN.

## 2022-07-21 NOTE — PROGRESS NOTES
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 Pt has two stairs to access his bedroom/bathroom but otherwise all needs met on main level. Tub or walk-in shower.   Self-Care   Usual Activity Tolerance excellent   Current Activity Tolerance good   Regular Exercise Yes   Activity/Exercise Type other (see comments)  (Pt works as farmer, very active baseline)   Equipment Currently Used at Home none   Fall history within last six months no   Activity/Exercise/Self-Care Comment Pt IND at baseline with ADLs/IADLs   Instrumental Activities of Daily Living (IADL)   Previous Responsibilities yardwork;medication management;finances;driving;work   IADL Comments Wife can A   General Information   Onset of Illness/Injury or Date of Surgery 07/21/22   Referring Physician Audrey Barajas MD   Patient/Family Therapy Goal Statement (OT) To return home   Additional Occupational Profile Info/Pertinent History of Current Problem per chart Laz Stein is a 52 year old male who was transferred from outside hospital with one week history of aphasia, found to have new left parietal contrast enhancing lesion. He underwent left sided craniotomy for resection on 7/20/22.   Existing Precautions/Restrictions other (see comments)  (craniotomy)   Cognitive Status Examination   Orientation Status orientation to person, place and time   Cognitive Status Comments Appears intact, some mild word finding difficulties   Visual Perception   Visual Impairment/Limitations corrective lenses full-time;other (see comments)  (pt wears contacts)   Sensory   Sensory Quick Adds No deficits were identified   Posture    Posture not impaired   Range of Motion Comprehensive   Comment, General Range of Motion BUE WNL   Strength Comprehensive (MMT)   Comment, General Manual Muscle Testing (MMT) Assessment 5/5 BUE and LEs   Coordination   Upper Extremity Coordination No deficits were identified   Bed Mobility   Bed Mobility supine-sit;sit-supine   Supine-Sit [place] (Bed Mobility) independent   Sit-Supine [place] (Bed Mobility) independent   Transfers   Transfers sit-stand transfer;toilet transfer;shower transfer   Sit-Stand Transfer   Sit-Stand [place] (Transfers) set up   Shower Transfer   Type (Shower Transfer) lateral   [place] Level (Shower Transfer) supervision   Toilet Transfer   Type (Toilet Transfer) sit-stand   [place] Level (Toilet Transfer) set up   Balance   Balance Comments No balance deficits noted on household distance ambulation, pt able to turn head side to side w/o path deviations, LOB or significant change in speed   Activities of Daily Living   BADL Assessment/Intervention bathing;lower body dressing;toileting   Bathing Assessment/Intervention   [place] Level (Bathing) minimum assist (75% patient effort)   Lower Body Dressing Assessment/Training   [place] Level (Lower Body Dressing) set up   Toileting   [place] Level (Toileting) set up   Clinical Impression   Criteria for Skilled Therapeutic Interventions Met (OT) Yes, treatment indicated   OT Diagnosis Pt is below baseline for ADLs/IADLs   OT Problem List-Impairments impacting ADL problems related to;activity tolerance impaired;post-surgical precautions;pain   Assessment of Occupational Performance 1-3 Performance Deficits   Planned Therapy Interventions (OT) ADL retraining;IADL retraining;transfer training;home program guidelines;progressive activity/exercise   Clinical Decision Making Complexity (OT) low complexity   Risk & Benefits of therapy have been explained evaluation/treatment results reviewed;care plan/treatment  goals reviewed;risks/benefits reviewed;current/potential barriers reviewed;participants voiced agreement with care plan;participants included;patient;spouse/significant other   Clinical Impression Comments Pt presents below functional baseline, limited by post-surgical precautions and pain, pt will benefit from skilled OT services to progress toward PLOF.   OT Discharge Planning   OT Discharge Recommendation (DC Rec) home with assist   OT Rationale for DC Rec Anticipate once medically ready, pt will be safe to d/c home with family A PRN   OT Brief overview of current status SBA   Total Evaluation Time (Minutes)   Total Evaluation Time (Minutes) 7   OT Goals   Therapy Frequency (OT) 4 times/wk   OT Predicted Duration/Target Date for Goal Attainment 07/25/22   OT Goals Lower Body Dressing;Lower Body Bathing;Toilet Transfer/Toileting;OT Goal 1   OT: Lower Body Dressing Independent;within precautions   OT: Lower Body Bathing Modified independent;with precautions   OT: Toilet Transfer/Toileting cleaning and garment management;toilet transfer;using adaptive equipment;within precautions   OT: Goal 1 Pt will identify 100% of craniotomy precautions by discharged when prompted.

## 2022-07-21 NOTE — OP NOTE
Name:  Laz Stein  MRN:  [id number]  YOB: 1970  Date of Surgery:  July 21, 2022      Pre-operative Diagnosis: Left parietal brain metastasis  Post-operative Diagnosis: Same  Procedure:  1) Craniotomy for tumor resection, left  2) Neuro-navigation  3) Micro-dissection  4) 5ALA-guided resection    Anesthesia: GETJIHAN    Surgeon: Tanner Ann MD, PhD  Assistant:  Shen Cheung MD    Indication: 52 M who presented with conductive aphasia secondary to a left parietal lesion. The patient presents for resection of the lesion, with goal for palliation of mass effect and definitive tissue diagnosis.    Description of Procedure: After pre-operative laboratory value and informed consent were verified, the patient was brought into the operating room. The patient underwent general anesthesia and intubation. Antibiotic was administered.    The patient was placed in a supine position, with the head turned to the right, exposing the left parietal cranium.  The [employer] Stealth reference frame was placed. The patient's anatomy was registered relative to the pre-operative MRI using the [employer] system.    The region overlying the tumor was identified. An incision was planned based on the location of the tumor, simulating the posterior end of a trauma flap. The area encompassing the surgical incision was prepped and draped in a sterile manner.     Time out was performed. The incision was infiltrated with 0.25% marcaine with dilute epinephrine. Incision was made using a 10 scalpel.  Hemostasis was achieved using a combination of cautery and Jared clips. The incision was retracted using skin hooks.  The region overlying the tumor was confirmed using the Stealth probe.    A single Domenica hole was made and extended into a 4 cm craniotomy.  Dural bleeding was controlled. Dural tenters were placed. The epidural space was packed with Surgi-flow.    The dura was opened in a cruciate manner. The brain was extremely  full, and an area of highly vascular tumor was evident. Corticectomy was performed to decompress the mass effect. The cystic region of the tumor was identified and drained. After this maneuver, the cerebrum became relaxed.  A biopsy was taken and sent to pathology.     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    The microscope was brought in to facilitate micro-dissection. The plane between the tumor capsule and the normal cerebrum was identified. This plane was developed. Through a combination of tumor debulking and plane development, the dissection as carried out until the entirety of the lesion was removed. Additional resection was carried out using 5ALA florescence guidance.     Meticulous hemostasis was achieved after the tumor resection, and the resection cavity was covered with Surgicel.    After hemostasis, I turned my attention to the closure. The dura was closed was Duragen. The craniotomy flap was secured using titanium plate/screw construct. The wound was amply irrigated with normal saline. The galea was closed with 2'0 vicryl. The skin was closed with 3'0 Monocryl. Exofin was applied.    I was present and performed the key portions of the procedure.    EBL: 300 ml's    Specimens: resected tumor specimen    Disposition: ICU

## 2022-07-21 NOTE — ANESTHESIA PROCEDURE NOTES
Arterial Line Procedure Note    Pre-Procedure   Staff -        Anesthesiologist:  Gustabo Morales MD       Performed By: anesthesiologist       Location: OR       Pre-Anesthestic Checklist: patient identified, IV checked, risks and benefits discussed, informed consent, monitors and equipment checked, pre-op evaluation and at physician/surgeon's request  Timeout:       Correct Patient: Yes        Correct Procedure: Yes        Correct Site: Yes        Correct Position: Yes   Line Placement:   This line was placed Post Induction starting at 7/20/2022 7:26 PM and ending at 7/20/2022 7:30 PM  Procedure   Procedure: arterial line       Diagnosis: Craniotomy       Laterality: left       Insertion Site: radial.  Sterile Prep        Standard elements of sterile barrier followed       Skin prep: Chloraprep  Insertion/Injection        Technique: Seldinger Technique        Catheter Type/Size: 20 G, 1.75 in/4.5 cm quick cath (integral wire)  Narrative        Tegaderm dressing used.       Complications: None apparent,        Arterial waveform: Yes        IBP within 10% of NIBP: Yes

## 2022-07-21 NOTE — BRIEF OP NOTE
"[facility]    Brief Operative Note    Pre-operative diagnosis: Brain tumor (H) [D49.6]  Post-operative diagnosis Brain tumor (H) [D49.6]    Procedure: Procedure(s):  Stealth assisted  CRANIOTOMY FOR TUMOR RESECTION  Surgeon: Surgeon(s) and Role:     * Tanner Reese MD - Primary     * Shen Cheung MD - Resident - Assisting  Anesthesia: General   Estimated Blood Loss: 300cc    Drains: None  Specimens:   ID Type Source Tests Collected by Time Destination   1 : Tumor 1  Tissue Other SURGICAL PATHOLOGY EXAM Tanner Reese MD 7/20/2022  9:08 PM    2 : TUMOR 2 Tissue Brain SURGICAL PATHOLOGY EXAM Tanner Reese MD 7/20/2022 10:11 PM    3 : TUMOR 3 Tissue Brain SURGICAL PATHOLOGY EXAM Tanner Reese MD 7/20/2022 10:12 PM      Findings:  Grossly abnormal appearing tissue. Well decompressed. Frozen c/w high grade glioma. Final diagnosis pending permanent pathology.  Complications: None.  Implants:   Implant Name Type Inv. Item Serial No.  Lot No. LRB No. Used Action   GRAFT DURAGEN 3X3\"  - [id number] Other GRAFT DURAGEN 3X3\"   [employer] [id number] Left 1 Implanted   IMP SCR SYN MATRIX LOW PRO 1.5X04MM SELF DRILL [id number] - [id number] Metallic Hardware/[place] IMP SCR SYN MATRIX LOW PRO 1.5X04MM SELF DRILL [id number]  [employer] NONE Left 7 Implanted   IMP PLATE SYN STR DOG BONE LOW PROFILE 2H .502 - [id number] Metallic Hardware/[place] IMP PLATE SYN STR DOG BONE LOW PROFILE 2H .502  [employer] NONE Left 2 Implanted   IMP BUR HOLE COVER 24MM LOW PROFILE .528 - [id number] Metallic Hardware/[place] IMP BUR HOLE COVER 24MM LOW PROFILE .528  [employer] NONE Left 1 Implanted           "

## 2022-07-21 NOTE — PLAN OF CARE
Major Shift Events:  Post-op neuro checks completed. Neurologically intact aside from mild aphasia and word finding difficulty. Reports mild headache, gave scheduled tylenol, refusing narcotics. SR/SB 50s-70s, no ectopy. SBP < 140, gave PRN hydralazine x1. Afebrile. RA, clear lung sounds. Clear liquid diet, advance as tolerated. Rose with adequate UOP. No BM overnight. Right forehead pin site has mild sanguinous drainage. Removed arterial line this AM, small hematoma at insertion site. MD updated wife on post-op findings.  Plan: Continue to monitor.  For vital signs and complete assessments, please see documentation flowsheets.

## 2022-07-21 NOTE — PROGRESS NOTES
Neurocritical Care Consultation    Reason for critical care admission: resection of brain tumor  Admitting Team: neurosurgery  Date of Service:  07/21/2022  Date of Admission:  7/18/2022  Hospital Day: 4    Assessment/Plan  Laz Stein is a 52 year old male without a significant past medical history admitted on 7/18/2022 for 1 week history of paraphasic errors with word substitutions found to have a large left parietal occipital lesion on CT.  Patient went to the OR afternoon of 07/20 and was admitted to 4A post operatively.     24 hour events: Admitted to 4A post craniotomy, repeat head CT performed this AM.     Neuro  #left parietal occipital brain tumor s/p resection   #vasogenic cerebral edema  #postoperative pneumocephalus  -Neurochecks every 2 hrs  -HOB > 30   -SBP goal < 140 mmHg  -PT/OT/SLP as able  -continue decadron taper  -keppra 1g BID per primary team    #Analgesics & sedation  -tylenol scheduled q8hrs scheduled, ultram PRN  -methocarbamol 500 mg TID PRN  -zofran and compazine PRN    CV  #HTN likely 2/2 pain  -Cardiac monitoring  -SBP goal < 140 mmHg  -PRN labetalol and hydralazine    Resp  #acute hypoxic respiratory failure, now resolved likely 2/2 post anesthesia   Oxygen/vent: room air, 6L overnight  -Continuous pulse ox  -Maintain O2 saturations greater than 92%  -encourage IS and ambulation to prevent atelectasis    GI  #constipation   Diet: Regular diet  Last BM: PTA  GI prophylaxis: pepcid with decadron  -Bowel regimen: scheduled senna-docusate and Miralax, PRN MOM    Renal/  #OTILIO  -Daily BMP  -IV fluids: none  -Electrolyte replacement protocol    Endo  #OTILIO  -Hgb A1c  -Monitor glucose levels    Heme  #OTILIO  -Daily CBC  -Hgb goal >7, plt goal >50k  -Transfuse to meet Hgb and plt goals    ID  #OTILIO  -Daily CBC  -Follow temperature curve  -cefazolin q8    ICU Check List  Lines/tubes/drains: PIVs  FEN:regular diet  PPX: DVT - SCDs; GI - pepcid.  Code: FULL CODE  Dispo: ICU -  NCC    Clinically Significant Risk Factors Present on Admission                TIME SPENT ON THIS ENCOUNTER   I spent 35 minutes of critical care time on the unit/floor managing the care of Laz Stein. Upon evaluation, this patient had a high probability of imminent or life-threatening deterioration due to s/p brain tumor resection, which required my direct attention, intervention, and personal management. Greater than 50% of my time was spent at the bedside counseling the patient and/or coordinating care regarding services listed in this note.    The patient was seen and discussed with the NCC attending, Dr. Abel Morataya.    Hardeep Thacker, APRN, CNP  Neurocritical Care  *[phone]  Text Page    Current Medications:    acetaminophen  975 mg Oral Q8H     ceFAZolin  1 g Intravenous Q8H     dexamethasone  4 mg Oral Q8H NURA     famotidine  10 mg Oral BID     levETIRAcetam  1,000 mg Oral BID     polyethylene glycol  17 g Oral Daily     senna-docusate  1 tablet Oral BID       PRN Medications:  [START ON 7/23/2022] acetaminophen, bisacodyl, hydrALAZINE, labetalol, lidocaine 4%, lidocaine (buffered or not buffered), magnesium hydroxide, methocarbamol, naloxone **OR** naloxone **OR** naloxone **OR** naloxone, ondansetron **OR** ondansetron, prochlorperazine **OR** prochlorperazine, sodium chloride (PF), sodium chloride (PF), traMADol    Infusions: none     Physical Examination:  Vitals: BP (!) 140/81   Pulse 84   Temp 97.5  F (36.4  C) (Oral)   Resp 20   Ht 1.829 m (6')   Wt 85.6 kg (188 lb 11.4 oz)   SpO2 97%   BMI 25.59 kg/m    General: Adult male patient, lying in bed, NAD   HEENT: Normocephalic, atraumatic, no icterus, oral cavity/oropharynx pink and moist  Cardiac: RRR  Chest: unlabored, expansion symmetric, no retractions or use of accessory muscles  Abdomen: soft, non-distended  Extremities: warm, no edema, distal pulses +2, well perfused  Skin: No rash or lesion  Psych: Calm and  cooperative  Neuro:  Mental status: awake, alert, attentive, oriented to self, time, place, and circumstance. Language is fluent and coherent with intact comprehension of complex commands.  Cranial nerves: PERRL +2mm, conjugate gaze, EOMI, facial sensation intact, face symmetric, shoulder shrug strong, tongue midline, no dysarthria. Mild expressive aphasia.   Motor: Normal bulk and tone. No abnormal movements. 5/5 strength in 4/4 extremities.   Sensory: Intact to light touch x 4 extremities  Coordination: No ataxia or dysmetria  Gait: MIKE, deferred    Labs and imaging:    Results for orders placed or performed during the hospital encounter of 07/18/22 (from the past 24 hour(s))   Glucose by meter   Result Value Ref Range    GLUCOSE BY METER POCT 116 (H) 70 - 99 mg/dL   Arterial Panel POCT   Result Value Ref Range    pH Arterial POCT 7.37 7.35 - 7.45    pCO2 Arterial POCT 37 35 - 45 mm Hg    pO2 Arterial POCT 241 (H) 80 - 105 mm Hg    Bicarbonate Arterial POCT 21 21 - 28 mmol/L    Sodium POCT 132 (L) 133 - 144 mmol/L    Potassium POCT 3.3 (L) 3.5 - 5.0 mmol/L    Hemoglobin POCT 10.9 (L) 13.3 - 17.7 g/dL    Glucose Whole Blood POCT 104 (H) 70 - 99 mg/dL    Calcium, Ionized Whole Blood POCT 4.0 (L) 4.4 - 5.2 mg/dL    Base Excess/Deficit (+/-) POCT -3.8 -9.6 - 2.0 mmol/L    FIO2 POCT 45.0 %    Lactic Acid POCT 1.0 <=2.0 mmol/L   Surgical Pathology Exam   Result Value Ref Range    Case Report       Surgical Pathology Report                         Case: [id number]                                  Authorizing Provider:  Tanner Reese MD Collected:           07/20/2022 09:08 PM          Ordering Location:     [facility] OR                 Received:            07/20/2022 09:23 PM          Pathologist:           Shubahm Reese MD                                                        Intraop:               Shubham Reese MD                                                        Specimen:    Other, Tumor 1                                                                              Intraoperative Consultation       A(1). Other, Tumor 1 :  AFS1: Brain, tumor 1, biopsy:  -Glioma       Arterial Panel POCT   Result Value Ref Range    pH Arterial POCT 7.33 (L) 7.35 - 7.45    pCO2 Arterial POCT 48 (H) 35 - 45 mm Hg    pO2 Arterial POCT 249 (H) 80 - 105 mm Hg    Bicarbonate Arterial POCT 25 21 - 28 mmol/L    Sodium POCT 131 (L) 133 - 144 mmol/L    Potassium POCT 4.0 3.5 - 5.0 mmol/L    Hemoglobin POCT 12.4 (L) 13.3 - 17.7 g/dL    Glucose Whole Blood POCT 116 (H) 70 - 99 mg/dL    Calcium, Ionized Whole Blood POCT 4.4 4.4 - 5.2 mg/dL    Base Excess/Deficit (+/-) POCT -1.2 -9.6 - 2.0 mmol/L    FIO2 POCT 45.0 %    Lactic Acid POCT 1.0 <=2.0 mmol/L   Arterial Panel POCT   Result Value Ref Range    pH Arterial POCT 7.31 (L) 7.35 - 7.45    pCO2 Arterial POCT 50 (H) 35 - 45 mm Hg    pO2 Arterial POCT 199 (H) 80 - 105 mm Hg    Bicarbonate Arterial POCT 25 21 - 28 mmol/L    Sodium POCT 135 133 - 144 mmol/L    Potassium POCT 4.1 3.5 - 5.0 mmol/L    Hemoglobin POCT 12.2 (L) 13.3 - 17.7 g/dL    Glucose Whole Blood POCT 117 (H) 70 - 99 mg/dL    Calcium, Ionized Whole Blood POCT 4.4 4.4 - 5.2 mg/dL    Base Excess/Deficit (+/-) POCT -1.8 -9.6 - 2.0 mmol/L    FIO2 POCT 40.0 %    Lactic Acid POCT 0.9 <=2.0 mmol/L   Glucose by meter   Result Value Ref Range    GLUCOSE BY METER POCT 129 (H) 70 - 99 mg/dL   CT Head w/o Contrast    Narrative    CT HEAD W/O CONTRAST 7/21/2022 1:11 AM    History: s/p L craniotomy for tumor resection; frozen showing glioma     Comparison: MR 7/19/2022.    Technique: Using multidetector thin collimation helical acquisition  technique, axial, coronal and sagittal CT images from the skull base  to the vertex were obtained without intravenous contrast.   (topogram) image(s) also obtained and reviewed.    Findings: Postoperative changes of left parietal craniotomy for  underlying mass resection. Small volume of  pneumocephalus overlying  the left cerebral hemisphere. Blood products within the resection site  in the left parietal lobe. Small amount of extra-axial blood product  and gas overlying the resection site. Similar amount of edema about  the resection site as compared to MR 7/19/2022. No midline shift.  Gray/white matter differentiation in both cerebral hemispheres is  preserved. Similar localized mass effect on the occipital horn of the  left lateral ventricle. Ventricles are proportionate to the cerebral  sulci. The basal cisterns are clear.    The remaining bony calvaria and the bones of the skull base are  normal. Small mucous retention cyst in the left maxillary sinus. Air  cells are clear. The orbits are grossly unremarkable.      Impression    Impression:  1. Postoperative changes of left parietal craniotomy for underlying  mass resection with associated small volume pneumocephalus and blood  products within the resection cavity. No midline shift.  2. Small amount of extra-axial blood product and gas overlying the  resection site.  3. Similar amount of edema about the resection cavity as compared to  MR 7/19/2022 given differences in modality.    I have personally reviewed the examination and initial interpretation  and I agree with the findings.    VENKAT CAMP MD         SYSTEM ID:  [id number]   Glucose by meter   Result Value Ref Range    GLUCOSE BY METER POCT 134 (H) 70 - 99 mg/dL   CBC with platelets   Result Value Ref Range    WBC Count 18.9 (H) 4.0 - 11.0 10e3/uL    RBC Count 4.42 4.40 - 5.90 10e6/uL    Hemoglobin 13.0 (L) 13.3 - 17.7 g/dL    Hematocrit 40.4 40.0 - 53.0 %    MCV 91 78 - 100 fL    MCH 29.4 26.5 - 33.0 pg    MCHC 32.2 31.5 - 36.5 g/dL    RDW 13.4 10.0 - 15.0 %    Platelet Count 270 150 - 450 10e3/uL   Basic metabolic panel   Result Value Ref Range    Creatinine 0.84 0.67 - 1.17 mg/dL    Sodium 139 136 - 145 mmol/L    Potassium 4.0 3.4 - 5.3 mmol/L    Urea Nitrogen 17.8 6.0 - 20.0 mg/dL     Chloride 105 98 - 107 mmol/L    Carbon Dioxide (CO2) 25 22 - 29 mmol/L    Anion Gap 9 7 - 15 mmol/L    Glucose 138 (H) 70 - 99 mg/dL    GFR Estimate >90 >60 mL/min/1.73m2    Calcium 8.5 (L) 8.6 - 10.0 mg/dL   Magnesium   Result Value Ref Range    Magnesium 2.0 1.7 - 2.3 mg/dL   Phosphorus   Result Value Ref Range    Phosphorus 3.2 2.5 - 4.5 mg/dL   CT Head w/o Contrast    Narrative    CT HEAD W/O CONTRAST 7/21/2022 5:26 AM    History: s/p L parietal craniotomy for tumor resection; frozen showing  glioma     Comparison: CT earlier same day. MR 7/19/2022.    Technique: Using multidetector thin collimation helical acquisition  technique, axial, coronal and sagittal CT images from the skull base  to the vertex were obtained without intravenous contrast.   (topogram) image(s) also obtained and reviewed.    Findings: Postoperative changes of left parietal craniotomy for  underlying mass resection. A substantial change in small amount of  pneumocephalus overlying the left cerebral hemisphere. Unchanged  amount of blood product within the resection cavity. Slight increased  extra-axial blood product component of the collection overlying the  resection site. Unchanged amount of edema surrounding the resection  site. Similar localized mass effect on the occipital horn of the left  lateral ventricle. No midline shift. No acute loss of gray-white  matter differentiation.. Ventricles are proportionate to the cerebral  sulci. The basal cisterns are clear.    The bony calvaria and the bones of the skull base are normal. The  visualized portions of the paranasal sinuses and mastoid air cells are  clear. The orbits are grossly unremarkable.      Impression    Impression:  1. Postoperative changes of left parietal craniotomy for underlying  mass resection with stable volume of pneumocephalus and blood products  within the resection cavity. No new intracranial hemorrhage.  2. Slight increase in blood component of the gas and  fluid collection  overlying the resection site.    I have personally reviewed the examination and initial interpretation  and I agree with the findings.    VEKNAT CAMP MD         SYSTEM ID:  [id number]       All relevant imaging and laboratory values personally reviewed.

## 2022-07-21 NOTE — ANESTHESIA CARE TRANSFER NOTE
Patient: Laz Stein    Procedure: Procedure(s):  Stealth assisted  CRANIOTOMY FOR TUMOR RESECTION       Diagnosis: Brain tumor (H) [D49.6]  Diagnosis Additional Information: No value filed.    Anesthesia Type:   General     Note:    Oropharynx: spontaneously breathing  Level of Consciousness: awake  Oxygen Supplementation: face mask  Level of Supplemental Oxygen (L/min / FiO2): 6  Independent Airway: airway patency satisfactory and stable  Dentition: dentition unchanged  Vital Signs Stable: post-procedure vital signs reviewed and stable  Report to RN Given: handoff report given  Patient transferred to: PACU    Handoff Report: Identifed the Patient, Identified the Reponsible Provider, Reviewed the pertinent medical history, Discussed the surgical course, Reviewed Intra-OP anesthesia mangement and issues during anesthesia, Set expectations for post-procedure period and Allowed opportunity for questions and acknowledgement of understanding      Vitals:  Vitals Value Taken Time   /86 07/20/22 2319   Temp 36.6  C (97.9  F) 07/20/22 2315   Pulse 81 07/20/22 2329   Resp 14 07/20/22 2329   SpO2 100 % 07/20/22 2329   Vitals shown include unvalidated device data.    Electronically Signed By: Gunjan Lake MD  July 20, 2022  11:31 PM

## 2022-07-21 NOTE — PROGRESS NOTES
Admitted/transferred from: PACU  Reason for admission/transfer: S/p craniotomy with tumor resection  2 RN skin assessment: completed by Mellissa ALEXANDRE RN and Marylu FOX RN  Result of skin assessment and interventions/actions: Left head incision, right forehead pin site, abrasion right shin, old scars on right knee and right hip.  Height, weight, drug calc weight: Done  Patient belongings (see Flowsheet)  MDRO education added to care planNo  ?

## 2022-07-21 NOTE — PLAN OF CARE
PT evaluation cx and deferred.  OT completed evaluation.  Patient demonstrated ambulation at/near baseline with no additional acute PT intervention indicated to address balance/gait.  OT will continue to follow for ADLs/cognition and will address ambulation from endurance standpoint.  Will complete PT order.

## 2022-07-21 NOTE — ANESTHESIA PROCEDURE NOTES
Airway       Patient location during procedure: OR       Procedure Start/Stop Times: 7/20/2022 7:25 PM  Staff -        CRNA: Sabine Samuel APRN CRNA       Performed By: CRNA  Consent for Airway        Urgency: elective  Indications and Patient Condition       Indications for airway management: phan-procedural       Induction type:intravenous       Mask difficulty assessment: 1 - vent by mask    Final Airway Details       Final airway type: endotracheal airway       Successful airway: ETT - single  Endotracheal Airway Details        ETT size (mm): 7.5       Cuffed: yes       Successful intubation technique: direct laryngoscopy       DL Blade Type: Morales 2       Grade View of Cords: 1       Adjucts: stylet and tooth guard       Position: Right       Measured from: gums/teeth       Secured at (cm): 23       Bite block used: None    Post intubation assessment        Placement verified by: capnometry, equal breath sounds and chest rise        Number of attempts at approach: 1       Number of other approaches attempted: 0       Secured with: silk tape       Ease of procedure: easy       Dentition: Intact    Medication(s) Administered   Medication Administration Time: 7/20/2022 7:25 PM

## 2022-07-21 NOTE — PLAN OF CARE
Patient continues to complain of left headache/incisional pain. Refer to emar for prn analgesic given. Continues to have word finding problems and will continue to monitor. Tolerating oral diet with no problems. Up ambulating in halls with assist of 1. Refer to flowsheets for documentation.

## 2022-07-21 NOTE — PROGRESS NOTES
[facility], [place]   Neurosurgery Progress Note:    Date of service: 07/21/2022    Assessment: Laz Stein is a 52 year old male who was transferred from outside hospital with one week history of aphasia, found to have new left parietal contrast enhancing lesion. He underwent left sided craniotomy for resection on 7/20/22.      Clinically Significant Risk Factors Present on Admission             # Overweight: Estimated body mass index is 25.59 kg/m  as calculated from the following:    Height as of this encounter: 1.829 m (6').    Weight as of this encounter: 85.6 kg (188 lb 11.4 oz).  #cerebral edema      Plan:  - Neuro checks/vital signs: every 1hours  - SBP: <140  - Pain control: PRN Oxycodone, tylenol  - Steroids/Keppra: Decadron 4mg every 8 hours, Keppra 1000mg BID  - Activity: up with assistance  - Diet: NPO in anticipation of surgery   - DVT prophylaxis: SCDs  - Imaging:  MRI brain for pre-operative planning completed, Awaiting postoperative MRI brain tumor/stealth protocol  - PT/OT: Pending  - MRI Brain with and without contrast brain tumor protocol  - CT head stealth    Audrey Barajas MD  Neurosurgery Resident, PGY-3    Interval History:  Doing well after surgery. Pain under control. Plan to work with therapies today and receive MRI.         Objective:   Temp:  [97.4  F (36.3  C)-98.7  F (37.1  C)] 97.5  F (36.4  C)  Pulse:  [59-85] 74  Resp:  [10-16] 14  BP: (108-155)/(66-90) 139/75  Cuff Mean (mmHg):  [104] 104  MAP:  [85 mmHg-117 mmHg] 89 mmHg  Arterial Line BP: (130-153)/() 133/67  SpO2:  [96 %-100 %] 98 %  I/O last 3 completed shifts:  In: 2658.67 [I.V.:2658.67]  Out: 2365 [Urine:2015; Blood:350]    Gen: Appears comfortable, NAD  Neurologic:  - Alert & Oriented to person, place, time, and situation  - Follows commands briskly  - Speech discontinuous; Paraphasic errors.   - No gaze preference. No apparent hemineglect.  - PERRL, EOMI  - Strong eye closure, jaw  clench, and cheek puff  - Face symmetric with sensation intact to light touch  - Palate elevates symmetrically, uvula midline, tongue protrudes midline  - Trapezii and sternocleidomastoid muscles 5/5 bilaterally  - No pronator drift     Del Tr Bi WE WF Gr   R 5 5 5 5 5 5   L 5 5 5 5 5 5    HF KE KF DF PF EHL   R 5 5 5 5 5 5   L 5 5 5 5 5 5     Reflexes 2+ throughout    Sensation intact and symmetric to light touch throughout    LABS  Recent Labs   Lab Test 07/21/22 0416 07/20/22 2141 07/20/22 2117 07/20/22  2102 07/20/22  0338 07/19/22  0709   WBC 18.9*  --   --   --  15.5* 10.3   HGB 13.0* 12.2* 12.4*   < > 14.2 15.2   MCV 91  --   --   --  91 90     --   --   --  246 264    < > = values in this interval not displayed.       Recent Labs   Lab Test 07/21/22 0416 07/21/22 0410 07/21/22 0044 07/20/22 2141 07/20/22 2117 07/20/22  1649 07/20/22  0338 07/19/22  0709     --   --  135 131*   < > 137 137   POTASSIUM 4.0  --   --  4.1 4.0   < > 4.2 4.4   CHLORIDE 105  --   --   --   --   --  104 106   CO2 25  --   --   --   --   --  22 23   BUN 17.8  --   --   --   --   --  19.2 17.2   CR 0.84  --   --   --   --   --  0.81 0.82   ANIONGAP 9  --   --   --   --   --  11 8   ABDIEL 8.5*  --   --   --   --   --  8.9 9.0   * 134* 129* 117* 116*   < > 145* 143*    < > = values in this interval not displayed.       IMAGING    No results found for this or any previous visit (from the past 24 hour(s)).

## 2022-07-22 ENCOUNTER — APPOINTMENT (OUTPATIENT)
Dept: OCCUPATIONAL THERAPY | Facility: CLINIC | Age: 52
End: 2022-07-22
Attending: NEUROLOGICAL SURGERY
Payer: COMMERCIAL

## 2022-07-22 VITALS
SYSTOLIC BLOOD PRESSURE: 139 MMHG | TEMPERATURE: 97.5 F | HEART RATE: 72 BPM | RESPIRATION RATE: 16 BRPM | HEIGHT: 72 IN | DIASTOLIC BLOOD PRESSURE: 78 MMHG | BODY MASS INDEX: 25.33 KG/M2 | WEIGHT: 187 LBS | OXYGEN SATURATION: 98 %

## 2022-07-22 LAB
ANION GAP SERPL CALCULATED.3IONS-SCNC: 8 MMOL/L (ref 7–15)
BUN SERPL-MCNC: 14.1 MG/DL (ref 6–20)
CALCIUM SERPL-MCNC: 8.3 MG/DL (ref 8.6–10)
CHLORIDE SERPL-SCNC: 104 MMOL/L (ref 98–107)
CREAT SERPL-MCNC: 0.77 MG/DL (ref 0.67–1.17)
DEPRECATED HCO3 PLAS-SCNC: 26 MMOL/L (ref 22–29)
ERYTHROCYTE [DISTWIDTH] IN BLOOD BY AUTOMATED COUNT: 13.8 % (ref 10–15)
GFR SERPL CREATININE-BSD FRML MDRD: >90 ML/MIN/1.73M2
GLUCOSE SERPL-MCNC: 132 MG/DL (ref 70–99)
HCT VFR BLD AUTO: 39.3 % (ref 40–53)
HGB BLD-MCNC: 12.7 G/DL (ref 13.3–17.7)
MAGNESIUM SERPL-MCNC: 2.2 MG/DL (ref 1.7–2.3)
MCH RBC QN AUTO: 29.6 PG (ref 26.5–33)
MCHC RBC AUTO-ENTMCNC: 32.3 G/DL (ref 31.5–36.5)
MCV RBC AUTO: 92 FL (ref 78–100)
PHOSPHATE SERPL-MCNC: 2.7 MG/DL (ref 2.5–4.5)
PLATELET # BLD AUTO: 228 10E3/UL (ref 150–450)
POTASSIUM SERPL-SCNC: 4 MMOL/L (ref 3.4–5.3)
RBC # BLD AUTO: 4.29 10E6/UL (ref 4.4–5.9)
SODIUM SERPL-SCNC: 138 MMOL/L (ref 136–145)
WBC # BLD AUTO: 15.8 10E3/UL (ref 4–11)

## 2022-07-22 PROCEDURE — 84100 ASSAY OF PHOSPHORUS: CPT | Performed by: STUDENT IN AN ORGANIZED HEALTH CARE EDUCATION/TRAINING PROGRAM

## 2022-07-22 PROCEDURE — 97535 SELF CARE MNGMENT TRAINING: CPT | Mod: GO | Performed by: OCCUPATIONAL THERAPIST

## 2022-07-22 PROCEDURE — 99233 SBSQ HOSP IP/OBS HIGH 50: CPT | Performed by: PSYCHIATRY & NEUROLOGY

## 2022-07-22 PROCEDURE — 36415 COLL VENOUS BLD VENIPUNCTURE: CPT | Performed by: STUDENT IN AN ORGANIZED HEALTH CARE EDUCATION/TRAINING PROGRAM

## 2022-07-22 PROCEDURE — 250N000013 HC RX MED GY IP 250 OP 250 PS 637: Performed by: STUDENT IN AN ORGANIZED HEALTH CARE EDUCATION/TRAINING PROGRAM

## 2022-07-22 PROCEDURE — 99207 PR SC NO CHARGE VISIT: CPT | Performed by: PSYCHIATRY & NEUROLOGY

## 2022-07-22 PROCEDURE — 83735 ASSAY OF MAGNESIUM: CPT | Performed by: STUDENT IN AN ORGANIZED HEALTH CARE EDUCATION/TRAINING PROGRAM

## 2022-07-22 PROCEDURE — 250N000011 HC RX IP 250 OP 636: Performed by: STUDENT IN AN ORGANIZED HEALTH CARE EDUCATION/TRAINING PROGRAM

## 2022-07-22 PROCEDURE — 85014 HEMATOCRIT: CPT | Performed by: STUDENT IN AN ORGANIZED HEALTH CARE EDUCATION/TRAINING PROGRAM

## 2022-07-22 PROCEDURE — 80048 BASIC METABOLIC PNL TOTAL CA: CPT | Performed by: STUDENT IN AN ORGANIZED HEALTH CARE EDUCATION/TRAINING PROGRAM

## 2022-07-22 RX ORDER — DEXAMETHASONE 1.5 MG/1
TABLET ORAL
Qty: 12 TABLET | Refills: 0 | Status: SHIPPED | OUTPATIENT
Start: 2022-07-22 | End: 2022-07-22

## 2022-07-22 RX ORDER — TRAMADOL HYDROCHLORIDE 50 MG/1
50 TABLET ORAL EVERY 6 HOURS PRN
Qty: 20 TABLET | Refills: 0 | Status: SHIPPED | OUTPATIENT
Start: 2022-07-22 | End: 2022-08-12

## 2022-07-22 RX ORDER — DEXAMETHASONE 1 MG
TABLET ORAL
Qty: 18 TABLET | Refills: 0 | Status: SHIPPED | OUTPATIENT
Start: 2022-07-22 | End: 2022-08-12

## 2022-07-22 RX ORDER — LEVETIRACETAM 1000 MG/1
1000 TABLET ORAL 2 TIMES DAILY
Qty: 12 TABLET | Refills: 0 | Status: SHIPPED | OUTPATIENT
Start: 2022-07-22 | End: 2022-08-12

## 2022-07-22 RX ORDER — DIAZEPAM 5 MG
2.5 TABLET ORAL
Qty: 5 TABLET | Refills: 0 | Status: SHIPPED | OUTPATIENT
Start: 2022-07-22 | End: 2022-08-22

## 2022-07-22 RX ORDER — ACETAMINOPHEN 325 MG/1
650 TABLET ORAL EVERY 4 HOURS PRN
Qty: 60 TABLET | Refills: 0 | Status: SHIPPED | OUTPATIENT
Start: 2022-07-23 | End: 2022-08-22

## 2022-07-22 RX ADMIN — POLYETHYLENE GLYCOL 3350 17 G: 17 POWDER, FOR SOLUTION ORAL at 10:26

## 2022-07-22 RX ADMIN — Medication 2.5 MG: at 15:38

## 2022-07-22 RX ADMIN — ACETAMINOPHEN 975 MG: 325 TABLET, FILM COATED ORAL at 02:08

## 2022-07-22 RX ADMIN — DEXAMETHASONE 3 MG: 1.5 TABLET ORAL at 13:30

## 2022-07-22 RX ADMIN — LEVETIRACETAM 1000 MG: 500 TABLET, FILM COATED ORAL at 07:50

## 2022-07-22 RX ADMIN — DEXAMETHASONE 3 MG: 1.5 TABLET ORAL at 06:09

## 2022-07-22 RX ADMIN — ACETAMINOPHEN 975 MG: 325 TABLET, FILM COATED ORAL at 10:26

## 2022-07-22 RX ADMIN — FAMOTIDINE 10 MG: 10 TABLET, FILM COATED ORAL at 07:50

## 2022-07-22 RX ADMIN — SENNOSIDES AND DOCUSATE SODIUM 1 TABLET: 8.6; 5 TABLET ORAL at 10:26

## 2022-07-22 ASSESSMENT — ACTIVITIES OF DAILY LIVING (ADL)
ADLS_ACUITY_SCORE: 20
ADLS_ACUITY_SCORE: 22
ADLS_ACUITY_SCORE: 22
ADLS_ACUITY_SCORE: 20
ADLS_ACUITY_SCORE: 22
ADLS_ACUITY_SCORE: 20
ADLS_ACUITY_SCORE: 22

## 2022-07-22 NOTE — DISCHARGE SUMMARY
[facility] Discharge Summary and Instructions    Laz Stein MRN# [id number]   Age: 52 year old YOB: 1970     Date of Admission:  7/18/2022  Date of Discharge::  7/22/2022  Admitting Physician:  Tanner Reese MD  Discharge Physician:  Tanner Reese MD          Admission Diagnoses:   Brain tumor (H) [D49.6]          Discharge Diagnosis:     Brain tumor (H) [D49.6]     In addition to the assessment of diagnoses detailed above, this 52 year old male  patient admitted from home has the following conditions contributing to the complexity of their medical care:    Brain cancer and Brain compression which was evident on the CT/MRI.,         Procedures:   1) Craniotomy for tumor resection, left  2) Neuro-navigation  3) Micro-dissection  4) 5ALA-guided resection           Brief History of Illness:   Mr. 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 The patient presented for resection of the lesion, with goal for palliation of mass effect and definitive tissue diagnosis. After review or risks of procedure, patient elected to move forward with above procedures.         Hospital Course:   Patient underwent above-mentioned procedure on 7/20/2022. The operation was uncomplicated and he was admitted to the surgical ICU for routine post operative cares. On post operative day 1 patient experienced some tachycardia and anxiety with cardiac workup unremarkable. He slept well overnight. MRI Brain demonstrated gross total resection of brain mass. On post operative day 2 he was doing well and was ambulating, voiding without a diggs, eating a regular diet, pain was well controlled and therefore he was discharged home.    Exam on day of discharge:  Gen: Appears comfortable, NAD  Wound: Left sided craniotomy incision clean, dry, closed with monocryl and exofin  Neurologic:  - Alert & Oriented to person, place, time, and situation  - Follows  commands briskly  - Speech discontinuous; Paraphasic errors.   - No gaze preference. No apparent hemineglect.  - PERRL, EOMI  - Strong eye closure, jaw clench, and cheek puff  - Face symmetric with sensation intact to light touch  - Palate elevates symmetrically, uvula midline, tongue protrudes midline  - Trapezii and sternocleidomastoid muscles 5/5 bilaterally  - No pronator drift       Del Tr Bi WE WF Gr   R 5 5 5 5 5 5   L 5 5 5 5 5 5     HF KE KF DF PF EHL   R 5 5 5 5 5 5   L 5 5 5 5 5 5      Reflexes 2+ throughout     Sensation intact and symmetric to light touch throughout              Discharge Medications:     Current Discharge Medication List      START taking these medications    Details   acetaminophen (TYLENOL) 325 MG tablet Take 2 tablets (650 mg) by mouth every 4 hours as needed for headaches or pain (For optimal non-opioid multimodal pain management to improve pain control.)  Qty: 60 tablet, Refills: 0    Associated Diagnoses: Brain tumor (H)      dexamethasone (DECADRON) 1.5 MG tablet Take 2 tablets (3 mg) by mouth every 8 hours for 1 day, THEN 1.25 tablets (1.875 mg) every 8 hours for 1 day, THEN 0.75 tablets (1.125 mg) every 8 hours for 1 day.  Qty: 12 tablet, Refills: 0    Associated Diagnoses: Brain tumor (H)      levETIRAcetam (KEPPRA) 1000 MG tablet Take 1 tablet (1,000 mg) by mouth 2 times daily for 6 days  Qty: 12 tablet, Refills: 0    Associated Diagnoses: Brain tumor (H)      traMADol (ULTRAM) 50 MG tablet Take 1 tablet (50 mg) by mouth every 6 hours as needed for moderate to severe pain  Qty: 20 tablet, Refills: 0    Associated Diagnoses: Brain tumor (H)                     Discharge Instructions and Follow-Up:     Discharge diet: Regular   Discharge activity: You may advance activity as tolerated. No strenuous exercise or heay lifting greater than 10 lbs for 4 weeks or until seen and cleared in clinic.  Ambulate at least three time a day.   Discharge follow-up: Follow-up with Dr. Reese in  approximately 2 weeks.  8/5/2022 10:00 AM Tanner Reese MD  [facility] [phone] [facility]          Wound care: Ok to shower, however no scrubbing of the wound and no soaking of the wound, meaning no bathtubs or swimming pools. Pat dry only. Leave wound open to air inside; please cover incision by wearing a hat when outside.  Patient to have wound checked 2 weeks following surgery.    Wound location: left scalp  Closure technique: absorbable sutures and skin glue  Dressing needs: none  Post-op care at follow-up: Keep dry and clean     Please call if you have:  1. increased pain, redness, drainage, swelling at your incision  2. fevers > 101.5 F degrees  3. with any questions or concerns.  You may reach the Neurosurgery clinic at [phone] during regular work hours. ER at [phone].    and ask for the Neurosurgery Resident on call at [phone], during off hours or weekends.         Discharge Disposition:     Discharged to home        Moe Morales MD, PhD  Department of Neurosurgery

## 2022-07-22 NOTE — PROGRESS NOTES
[facility], [place]   Neurosurgery Progress Note:    Date of service: 07/22/2022    Assessment: Laz Stein is a 52 year old male who was transferred from outside hospital with one week history of aphasia, found to have new left parietal contrast enhancing lesion. He underwent left sided craniotomy for resection on 7/20/22. Postoperative CT showing residual blood products within the cavity, slight increase on morning CT and subsequently stable on midday repeat scan. Exam otherwise stable. Increased anxiety in afternoon on 7/21, cardiac workup sent thus far negative/unremarkable. Valium at bedtime began. MRI done in afternoon on 7/21/22 showing GTR.       Clinically Significant Risk Factors Present on Admission             # Overweight: Estimated body mass index is 25.36 kg/m  as calculated from the following:    Height as of this encounter: 1.829 m (6').    Weight as of this encounter: 84.8 kg (187 lb).  #cerebral edema      Plan:  - Serial neuro exams  - Continue valium at bedtime for sleep aid/anxiety  - SBP: <140  - Pain control: PRN Oxycodone, tylenol  - Antibiotics: Ancef x 3 doses (completed)  - Steroids/Keppra: Decadron 4mg every 8 hours -- 4 day taper, Keppra 1000mg BID  - Activity: up with assistance  - Diet: Regular   - DVT prophylaxis: SCDs  - Imaging:  MRI brain for pre-operative planning completed  - PT/OT: deferred until after surgery  - MRI Brain with and without contrast brain tumor protocol  - CT head stealth    Audrey Barajas MD  Neurosurgery Resident, PGY-3    Interval History:  NAEO.         Objective:   Temp:  [97.5  F (36.4  C)-98.4  F (36.9  C)] 98.4  F (36.9  C)  Pulse:  [56-96] 81  Resp:  [10-20] 16  BP: (113-155)/(60-95) 114/67  SpO2:  [95 %-98 %] 96 %  I/O last 3 completed shifts:  In: 960 [P.O.:860; I.V.:100]  Out: 2645 [Urine:2645]    Gen: Appears comfortable, NAD  Wound: Left sided craniotomy incision clean, dry, closed with monocryl and  exofin  Neurologic:  - Alert & Oriented to person, place, time, and situation  - Follows commands briskly  - Speech discontinuous; Paraphasic errors.   - No gaze preference. No apparent hemineglect.  - PERRL, EOMI  - Strong eye closure, jaw clench, and cheek puff  - Face symmetric with sensation intact to light touch  - Palate elevates symmetrically, uvula midline, tongue protrudes midline  - Trapezii and sternocleidomastoid muscles 5/5 bilaterally  - No pronator drift     Del Tr Bi WE WF Gr   R 5 5 5 5 5 5   L 5 5 5 5 5 5    HF KE KF DF PF EHL   R 5 5 5 5 5 5   L 5 5 5 5 5 5     Reflexes 2+ throughout    Sensation intact and symmetric to light touch throughout    LABS  Recent Labs   Lab Test 07/22/22  0324 07/21/22  0416 07/20/22  2141 07/20/22  2102 07/20/22  0338   WBC 15.8* 18.9*  --   --  15.5*   HGB 12.7* 13.0* 12.2*   < > 14.2   MCV 92 91  --   --  91    270  --   --  246    < > = values in this interval not displayed.       Recent Labs   Lab Test 07/22/22  0324 07/21/22  0416 07/21/22  0410 07/21/22  0044 07/20/22 2141 07/20/22  1649 07/20/22  0338    139  --   --  135   < > 137   POTASSIUM 4.0 4.0  --   --  4.1   < > 4.2   CHLORIDE 104 105  --   --   --   --  104   CO2 26 25  --   --   --   --  22   BUN 14.1 17.8  --   --   --   --  19.2   CR 0.77 0.84  --   --   --   --  0.81   ANIONGAP 8 9  --   --   --   --  11   ABDIEL 8.3* 8.5*  --   --   --   --  8.9   * 138* 134*   < > 117*   < > 145*    < > = values in this interval not displayed.       IMAGING    No results found for this or any previous visit (from the past 24 hour(s)).

## 2022-07-22 NOTE — CARE PLAN
Major shift events:  Neuro status unchanged, continues to have word finding difficulty, minor pain controlled with scheduled tylenol, HR 60-70, afebrile, voiding spontaneously with urinal, tolerating regular diet. OOB with SBA

## 2022-07-22 NOTE — PROGRESS NOTES
Alert and oriented x4 today, neuros intact. No word-finding difficulties noted. L crani incision CDI.  BP and HR stable WNL. No anxiety today. Voids spontaneously. Eats 100% of presented regular diet. PIVs removed. No BM today despite laxatives. Pt discharged to home accompanied by his wife Tracey.  They plan to stay at a hotel overnight and make the 500 mile trip home early tomorrow. Discharge instructions reviewed, no questions. Escorted to pharmacy and lobby by wheelchair.

## 2022-07-22 NOTE — PLAN OF CARE
Occupational Therapy Discharge Summary    Reason for therapy discharge:    All goals and outcomes met, no further needs identified.    Progress towards therapy goal(s). See goals on Care Plan in [facility] electronic health record for goal details.  Goals met    Therapy recommendation(s):    No further therapy is recommended.

## 2022-07-22 NOTE — PROGRESS NOTES
Neurocritical Care Progress Note    Reason for critical care admission: resection of brain tumor  Admitting Team: neurosurgery  Date of Service:  07/22/2022  Date of Admission:  7/18/2022  Hospital Day: 5    Assessment/Plan  Laz Stein is a 52 year old male without a significant past medical history admitted on 7/18/2022 for 1 week history of paraphasic errors with word substitutions found to have a large left parietal occipital lesion on CT.  Patient went to the OR afternoon of 07/20 and was admitted to  post operatively.     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   #vasogenic cerebral edema  #postoperative pneumocephalus  -Neurochecks every 2 hrs  -HOB > 30   -SBP goal < 140 mmHg  -PT/OT/SLP as able  -continue decadron taper  -keppra 1g BID per primary team  -follow-up with nsgy as outpatient    #Analgesics & sedation  -tylenol scheduled q8hrs scheduled, ultram PRN  -methocarbamol 500 mg TID PRN  -zofran and compazine PRN    CV  #HTN likely 2/2 pain  -Cardiac monitoring  -SBP goal < 140 mmHg  -PRN labetalol and hydralazine    Resp  #acute hypoxic respiratory failure, now resolved likely 2/2 post anesthesia - resolved  Oxygen/vent: room air, 6L overnight  -Continuous pulse ox  -Maintain O2 saturations greater than 92%  -encourage IS and ambulation to prevent atelectasis    GI  #constipation   Diet: Regular diet  Last BM: PTA  GI prophylaxis: pepcid with decadron  -Bowel regimen: scheduled senna-docusate and Miralax, PRN MOM    Renal/  #OTILIO  -Daily BMP  -IV fluids: none  -Electrolyte replacement protocol    Endo  #OTILIO  -Hgb A1c  -Monitor glucose levels    Heme  #OTILIO  -Daily CBC  -Hgb goal >7, plt goal >50k  -Transfuse to meet Hgb and plt goals    ID  #leukocytosis - likely reactive  -Daily CBC  -Follow temperature curve  -cefazolin q8, now complete    ICU Check List  Lines/tubes/drains: PIVs  FEN:regular  diet  PPX: DVT - SCDs; GI - pepcid.  Code: FULL CODE  Dispo: Floor/discharge    TIME SPENT ON THIS ENCOUNTER   I spent 35 minutes of critical care time on the unit/floor managing the care of Laz Stein. Upon evaluation, this patient had a high probability of imminent or life-threatening deterioration due to s/p brain tumor resection, which required my direct attention, intervention, and personal management. Greater than 50% of my time was spent at the bedside counseling the patient and/or coordinating care regarding services listed in this note.    The patient was seen and discussed with the NCC attending, Dr. Abel Morataya.    Hardeep Thacker, APRN, CNP  Neurocritical Care  *[phone]  Text Page    Current Medications:    acetaminophen  975 mg Oral Q8H     dexamethasone  3 mg Oral Q8H NURA    Followed by     [START ON 7/23/2022] dexamethasone  2 mg Oral Q8H NURA    Followed by     [START ON 7/24/2022] dexamethasone  1 mg Oral Q8H NURA     famotidine  10 mg Oral BID     levETIRAcetam  1,000 mg Oral BID     polyethylene glycol  17 g Oral Daily     senna-docusate  1 tablet Oral BID       PRN Medications:  [START ON 7/23/2022] acetaminophen, bisacodyl, diazepam, hydrALAZINE, labetalol, lidocaine 4%, lidocaine (buffered or not buffered), magnesium hydroxide, methocarbamol, naloxone **OR** naloxone **OR** naloxone **OR** naloxone, ondansetron **OR** ondansetron, prochlorperazine **OR** prochlorperazine, sodium chloride (PF), sodium chloride (PF), traMADol    Infusions: none     Physical Examination:  Vitals: /67   Pulse 81   Temp 97.8  F (36.6  C) (Oral)   Resp 16   Ht 1.829 m (6')   Wt 84.8 kg (187 lb)   SpO2 96%   BMI 25.36 kg/m    General: Adult male patient, lying in bed, NAD   HEENT: Normocephalic, atraumatic, no icterus, oral cavity/oropharynx pink and moist  Cardiac: RRR  Chest: unlabored, expansion symmetric, no retractions or use of accessory muscles  Abdomen: soft,  non-distended  Extremities: warm, no edema, distal pulses +2, well perfused  Skin: No rash or lesion  Psych: Calm and cooperative  Neuro:  Mental status: awake, alert, attentive, oriented to self, time, place, and circumstance. Language is fluent and coherent with intact comprehension of complex commands.  Cranial nerves: PERRL +2mm, conjugate gaze, EOMI, facial sensation intact, face symmetric, shoulder shrug strong, tongue midline, no dysarthria. Mild expressive aphasia - improved compared to yesterday   Motor: Normal bulk and tone. No abnormal movements. 5/5 strength in 4/4 extremities.   Sensory: Intact to light touch x 4 extremities  Coordination: No ataxia or dysmetria  Gait: MIKE, deferred    Labs and imaging:    Results for orders placed or performed during the hospital encounter of 07/18/22 (from the past 24 hour(s))   Prepare red blood cells (unit)   Result Value Ref Range    CROSSMATCH Compatible     UNIT ABO/RH A Pos     Unit Number [id number]     Unit Status Ready     Blood Component Type Red Blood Cells     Product Code [id number]     CODING SYSTEM [id number]     UNIT TYPE ISBT 6200    CT Head w/o Contrast    Addendum: 7/21/2022    Addendum to the prior report for completeness:    Unchanged mass effect upon the atrium of the left lateral ventricle.  Ventricles are proportionate to the cerebral sulci without evidence  for hydrocephalus.    LAWRENCE BEAULIEU MD         SYSTEM ID:  [id number]      Narrative    CT HEAD W/O CONTRAST 7/21/2022 12:00 PM    History: evaluate stability of fluid collection     Comparison: Same day    Technique: Using multidetector thin collimation helical acquisition  technique, axial, coronal and sagittal CT images from the skull base  to the vertex were obtained without intravenous contrast.   (topogram) image(s) also obtained and reviewed.    Findings:     Postsurgical changes of left parietal craniotomy and mass resection.  Unchanged mixed density blood products in the  resection cavity.  Decreased extra-axial mixed of postoperative pneumocephalus, blood  products, and fluid which measures up to 5 mm, previously 1.0 cm.  Additional small amount of mass effect on the adjacent parenchyma.  Blood products in the resection cavity measures 3.1 x 2.9 cm,  previously 3.1 x 2.9 cm. Additional unchanged small volume  postoperative pneumocephalus most evident over the left frontal lobe.    No convincing new intracranial hemorrhage. No new loss of gray-white  differentiation. Unchanged edema surrounding the resection cavity.  This mostly creates mass effect on the posterior aspect of the left  temporal lobe. No significant midline shift.      Impression    Impression:  1.  Postsurgical changes of left parietal craniotomy and mass  resection with stable blood products and fluid in the resection  cavity.  2.  Decreased size of the extra-axial mix of blood products, fluid,  and pneumocephalus underlying the craniotomy.   3.  No convincing new intracranial hemorrhage.    I have personally reviewed the examination and initial interpretation  and I agree with the findings.    LAWRENCE BEAULIEU MD         SYSTEM ID:  [id number]   EKG 12-lead, complete   Result Value Ref Range    Systolic Blood Pressure  mmHg    Diastolic Blood Pressure  mmHg    Ventricular Rate 100 BPM    Atrial Rate 100 BPM    SC Interval 138 ms    QRS Duration 92 ms     ms    QTc 456 ms    P Axis 48 degrees    R AXIS 47 degrees    T Axis 11 degrees    Interpretation ECG       Sinus rhythm  Junctional ST depression, probably normal  Borderline ECG  No previous ECGs available     XR Chest Port 1 View    Narrative    EXAM: XR CHEST PORT 1 VIEW  7/21/2022 5:25 PM     HISTORY:  tachycardia       COMPARISON:  None    FINDINGS: Single view of the chest.      Trachea is midline. The cardiomediastinal silhouette is within normal  limits. No significant pleural effusion or pneumothorax. No focal  airspace opacity. The visualized upper  abdomen is unremarkable.      Impression    IMPRESSION: No acute airspace disease.    I have personally reviewed the examination and initial interpretation  and I agree with the findings.    ELY MALAGON MD         SYSTEM ID:  [id number]   MR Brain w/o & w Contrast    Narrative     MR BRAIN W/O & W CONTRAST 7/21/2022 7:27 PM    Provided History: s/p L craniotomy for tumor resection; please include  full brain tumor protocol; with thin cut flair and perfusion.  ICD-10:    Comparison: 7/21/2022 CT, 7/19/2022 MRI.    Technique: Multiplanar T1-weighted, axial FLAIR, and susceptibility  images were obtained without intravenous contrast. Following  intravenous gadolinium-based contrast administration, axial  T2-weighted, diffusion, and T1-weighted images (in multiple planes)  were obtained.    Contrast: 10 mL    Findings:  Postsurgical changes of left parietal craniotomy and gross total mass  resection. Susceptibility within the resection cavity, thickened due  to blood products and foci of air. Stable extent of surrounding edema.  Susceptibility at the left anterior frontal lobe likely postoperative  pneumocephalus. Expected postoperative left convexity dural thickening  and minimal subdural effusion. Unchanged mass effect on the atrium of  the left lateral ventricle. No midline shift. No new intracranial  hemorrhage. Stable size of the ventricles. Normal major vascular  intracranial flow-voids.    Continued vascularity at the resection cavity, which may represent  postoperative changes. There is some thin rim enhancement at the  anterior margins of the resection cavity favor this postoperative.  Decreased cerebral blood volume at the resection cavity.    No abnormality of the skull marrow signal. The visualized portions of  paranasal sinuses, and mastoid air cells are relatively clear. The  orbits are grossly unremarkable.      Impression    Impression:  Gross total resection of left parietal mass with expected  early  postoperative changes. No acute complication.     I have personally reviewed the examination and initial interpretation  and I agree with the findings.    DAVID GAMA MD         SYSTEM ID:  [id number]   CK total   Result Value Ref Range     39 - 308 U/L   Troponin T, High Sensitivity   Result Value Ref Range    Troponin T, High Sensitivity <6 <=22 ng/L   Magnesium   Result Value Ref Range    Magnesium 2.2 1.7 - 2.3 mg/dL   Phosphorus   Result Value Ref Range    Phosphorus 2.7 2.5 - 4.5 mg/dL   CBC with platelets   Result Value Ref Range    WBC Count 15.8 (H) 4.0 - 11.0 10e3/uL    RBC Count 4.29 (L) 4.40 - 5.90 10e6/uL    Hemoglobin 12.7 (L) 13.3 - 17.7 g/dL    Hematocrit 39.3 (L) 40.0 - 53.0 %    MCV 92 78 - 100 fL    MCH 29.6 26.5 - 33.0 pg    MCHC 32.3 31.5 - 36.5 g/dL    RDW 13.8 10.0 - 15.0 %    Platelet Count 228 150 - 450 10e3/uL   Basic metabolic panel   Result Value Ref Range    Creatinine 0.77 0.67 - 1.17 mg/dL    Sodium 138 136 - 145 mmol/L    Potassium 4.0 3.4 - 5.3 mmol/L    Urea Nitrogen 14.1 6.0 - 20.0 mg/dL    Chloride 104 98 - 107 mmol/L    Carbon Dioxide (CO2) 26 22 - 29 mmol/L    Anion Gap 8 7 - 15 mmol/L    Glucose 132 (H) 70 - 99 mg/dL    GFR Estimate >90 >60 mL/min/1.73m2    Calcium 8.3 (L) 8.6 - 10.0 mg/dL       All relevant imaging and laboratory values personally reviewed.

## 2022-07-23 LAB
ATRIAL RATE - MUSE: 100 BPM
DIASTOLIC BLOOD PRESSURE - MUSE: NORMAL MMHG
INTERPRETATION ECG - MUSE: NORMAL
P AXIS - MUSE: 48 DEGREES
PR INTERVAL - MUSE: 138 MS
QRS DURATION - MUSE: 92 MS
QT - MUSE: 354 MS
QTC - MUSE: 456 MS
R AXIS - MUSE: 47 DEGREES
SYSTOLIC BLOOD PRESSURE - MUSE: NORMAL MMHG
T AXIS - MUSE: 11 DEGREES
VENTRICULAR RATE- MUSE: 100 BPM

## 2022-07-25 ENCOUNTER — LAB (OUTPATIENT)
Dept: LAB | Facility: CLINIC | Age: 52
End: 2022-07-25
Payer: COMMERCIAL

## 2022-07-25 DIAGNOSIS — D49.6 BRAIN TUMOR (H): Primary | ICD-10-CM

## 2022-07-25 LAB
INTERPRETATION: NORMAL
LAB DIRECTOR COMMENTS: NORMAL
LAB DIRECTOR DISCLAIMER: NORMAL
LAB DIRECTOR INTERPRETATION: NORMAL
LAB DIRECTOR METHODOLOGY: NORMAL
LAB DIRECTOR RESULTS: NORMAL
SIGNIFICANT RESULTS: NORMAL
SPECIMEN DESCRIPTION: NORMAL
SPECIMEN DESCRIPTION: NORMAL
TEST DETAILS, MDL: NORMAL

## 2022-07-25 PROCEDURE — G0452 MOLECULAR PATHOLOGY INTERPR: HCPCS | Mod: 26 | Performed by: PATHOLOGY

## 2022-07-25 PROCEDURE — 81287 MGMT GENE PRMTR MTHYLTN ALYS: CPT | Performed by: NEUROLOGICAL SURGERY

## 2022-07-25 NOTE — ANESTHESIA POSTPROCEDURE EVALUATION
Patient: Laz Stein    Procedure: Procedure(s):  Stealth assisted  CRANIOTOMY FOR TUMOR RESECTION       Anesthesia Type:  General    Note:  Disposition: Inpatient   Postop Pain Control: Uneventful            Sign Out: Well controlled pain   PONV: No   Neuro/Psych: Uneventful            Sign Out: Acceptable/Baseline neuro status   Airway/Respiratory: Uneventful            Sign Out: Acceptable/Baseline resp. status   CV/Hemodynamics: Uneventful            Sign Out: Acceptable CV status; No obvious hypovolemia; No obvious fluid overload   Other NRE: NONE   DID A NON-ROUTINE EVENT OCCUR? No           Last vitals:  Vitals Value Taken Time   /66 07/21/22 0015   Temp 36.7  C (98  F) 07/20/22 2345   Pulse 78 07/21/22 0015   Resp 14 07/21/22 0000   SpO2 98 % 07/21/22 0015       Electronically Signed By: Charlie Barger MD  July 25, 2022  5:53 AM

## 2022-07-26 PROCEDURE — 81445 SO NEO GSAP 5-50DNA/DNA&RNA: CPT | Performed by: NEUROLOGICAL SURGERY

## 2022-08-03 ENCOUNTER — TELEPHONE (OUTPATIENT)
Dept: RADIATION ONCOLOGY | Facility: CLINIC | Age: 52
End: 2022-08-03

## 2022-08-03 DIAGNOSIS — G93.89 BRAIN MASS: Primary | ICD-10-CM

## 2022-08-03 NOTE — TELEPHONE ENCOUNTER
Attempted to contact Laz and Tracey to schedule consult with Dr. Calix. VM full on number listed for Laz. Left message with return number on [phone] number.

## 2022-08-04 ENCOUNTER — TELEPHONE (OUTPATIENT)
Dept: NEUROSURGERY | Facility: CLINIC | Age: 52
End: 2022-08-04

## 2022-08-04 ENCOUNTER — PATIENT OUTREACH (OUTPATIENT)
Dept: ONCOLOGY | Facility: CLINIC | Age: 52
End: 2022-08-04

## 2022-08-04 DIAGNOSIS — D49.6 BRAIN TUMOR (H): Primary | ICD-10-CM

## 2022-08-04 LAB
PATH REPORT.ADDENDUM SPEC: ABNORMAL
PATH REPORT.COMMENTS IMP SPEC: ABNORMAL
PATH REPORT.COMMENTS IMP SPEC: YES
PATH REPORT.FINAL DX SPEC: ABNORMAL
PATH REPORT.GROSS SPEC: ABNORMAL
PATH REPORT.INTRAOP OBS SPEC DOC: ABNORMAL
PATH REPORT.MICROSCOPIC SPEC OTHER STN: ABNORMAL
PATH REPORT.RELEVANT HX SPEC: ABNORMAL
PHOTO IMAGE: ABNORMAL

## 2022-08-04 NOTE — PROGRESS NOTES
I called number listed for Laz and his voice mailbox is full.  Tried his wife, Tracey's phone and left a detailed vm asking for a return call.  I am wondering if he is choosing to receive care in [place] or [place]

## 2022-08-04 NOTE — TELEPHONE ENCOUNTER
Writer was not able to reach pt or leave a message. Writer LVM for Tracey explaining that Dr. Reese's clinic has been canceled on 8/5 so the video visit has been rescheduled to 8/12 at 10am.    Please help pt to reschedule if needed. Pt should have a return, Virtual appt with Dr. Reese for next available. To schedule with Dr. Reese please use Dept:  ON[facility] ADULT NEUROSRG [[id number]]    Lauren Joyce

## 2022-08-05 NOTE — PROGRESS NOTES
2nd attempt to reach Laz.  Again his mailbox is full but was able to leave a vm on Tracey's phone.    I received a referral for patient to be seen by medical oncology.  I called the patient and left a vm asking that they return my call.  Contact information was left.

## 2022-08-08 ENCOUNTER — TRANSCRIBE ORDERS (OUTPATIENT)
Dept: ONCOLOGY | Facility: CLINIC | Age: 52
End: 2022-08-08

## 2022-08-08 DIAGNOSIS — C71.9 GBM (GLIOBLASTOMA MULTIFORME) (H): Primary | ICD-10-CM

## 2022-08-08 NOTE — PROGRESS NOTES
3rd attempt to reach Laz.  His mailbox remains full and I am not able to leave a vm.  I left mile message on his wife's cell number listed.  I also told them to call our NPS (new patient scheduling) team if they are interested in scheduling a neuro medical oncology referral.  Gave them the number for NPS (new patient scheduling) :  [phone]    Also left my contact information should they have any further questions.    Message to referring MD, Dr. Tanner Reese.

## 2022-08-08 NOTE — PROGRESS NOTES
Laz returned my call!  I am working with the radiation team to try and coordinate his appointments so they don't have to travel as much.  He is from [place].    He is also asking about any housing options.  Unfortunately, his wife is not vaccinated so [facility] would not be able [facility].  I have sent an IB to  to see if they might be able to call and/or e-mail Matt and his wife some other housing options.    I am still working to see if I might be able to get an appointment for Matt on Friday 8/12.    New Patient Oncology Nurse Navigator Note     Referring provider: Dr. Tanner Reese     Referring Clinic/Organization: [facility]     Referred to (specialty): Neuro Medical Oncology    Requested provider (if applicable): Dr. Shirin Stone     Date Referral Received: 8/4/2022     Evaluation for : brain tumor--[facility]     Clinical History (per Nurse review of records provided):    **BOOK MARKED**  NOTES:  7/22/2022:  Discharge summary   7/20/2022:  Op note  7/18/2022:  H&P    IMAGING:  ~multiple imaging done at     PATHOLOGY:  7/20/2022:  Addendum   FINAL INTEGRATED DIAGNOSIS:  - Glioblastoma, IDH-wildtype (CNS WHO grade 4). See comment.     COMMENT: This infiltrating glioma is negative for IDH1 and IDH2 mutations by sequencing analysis and shows palisading tumor necrosis and microvascular proliferation, supporting the diagnosis of glioblastoma, IDH-wildtype (CNS WHO grade 4). MGMT promoter methylation is NEGATIVE.         Clinical Assessment / Barriers to Care (Per Nurse): Lives [place] miles away in [place]      Records Location (Care Everywhere, Media, etc.): [facility]     Records Needed: NONE     Additional testing needed prior to consult: none

## 2022-08-09 ENCOUNTER — PATIENT OUTREACH (OUTPATIENT)
Dept: CARE COORDINATION | Facility: CLINIC | Age: 52
End: 2022-08-09

## 2022-08-09 NOTE — TELEPHONE ENCOUNTER
RECORDS STATUS - ALL OTHER DIAGNOSIS      RECORDS RECEIVED FROM: [facility]   DATE RECEIVED: 8/9   NOTES STATUS DETAILS   OFFICE NOTE from referring provider Tanner Vazquez MD in  ONCOLOGY ADULT   DISCHARGE SUMMARY from hospital [facility] 7/18/22   OPERATIVE REPORT Epic 7/20/22: Craniotomy   MEDICATION LIST [facility] 7/23/22   LABS     PATHOLOGY REPORTS [facility] 7/20/22: Surg Path   ANYTHING RELATED TO DIAGNOSIS Epic 7/22/22   GENONOMIC TESTING     TYPE: Epic 7/25/22   IMAGING (NEED IMAGES & REPORT)     CT SCANS PACS 7/21/22: uepic   MRI PACS 7/21/22, 7/19/22: uEpic   XR PACS 7/21/22: Epic

## 2022-08-09 NOTE — PROGRESS NOTES
E-mail sent this morning to confirm our conversation:  Morning!    Sorry for the delay--I am still hoping we can move some patients so you can see both doctors on Friday.  For now, this is what I have:    Friday 8/12/2022:  Radiation Oncology Consult  Directions to Department    Department Location: Located at [facility]. Parking is available in the Patient/Visitor parking ramp located on the corner of [facility] [address] [place] [address].   From Sign In Desk:      Department Address: [address] [place] [address], 1st Floor [facility] [address]    Department Phone: [phone]      1:15 pm:  check in  1:30 pm:  consult with Dr. Collin Hernandez, Radiation Oncology          Monday 8/15/2022:  Medical Oncology Consult  [facility]   [address]   [address]   [place] [address]   Appointments: [phone]     8:15 am:  check in  8:30 am:  Consult with Dr. Shirin Stone      I will let you know if I hear anything else today.  I also sent a message to LOUIE Graf () yesterday to reach out by phone and/or e-mail with some housing options!  Let me know if you have any questions!    Lakeshia Hillman, RN, BSN

## 2022-08-09 NOTE — PROGRESS NOTES
I tried to call Matt to let him know the  was able to move some patients around so we can get both of his oncology consults, same day.  Sent the following e-mail:  UPDATE:      Morning!    Sorry for the delay--I am still hoping we can move some patients so you can see both doctors on Friday.  For now, this is what I have:    Friday 8/12/2022:  Radiation Oncology Consult  Directions to Department    Department Location: Located at [facility]. Parking is available in the Patient/Visitor parking ramp located on the corner of [facility] [address] and [address].     From Sign In Desk:      Department Address: [address], 1st Floor [facility] [address]    Department Phone: [phone]      1:15 pm:  check in  1:30 pm:  consult with Dr. Collin Hernandez, Radiation Oncology          Friday 8/12/2022:  Medical Oncology Consult  [facility]   [address]   [address]   [place] [address]   Appointments: [phone]     10:45 am:  check in  11 am:  Consult with Dr. Shirin Stone      I also sent a message to LOUIE Graf () yesterday to reach out by phone and/or e-mail with some housing options!  Let me know if you have any questions!    Take Care!    Lakeshia Hillman, RN, BSN

## 2022-08-09 NOTE — PROGRESS NOTES
Social Work Intervention  [facility]    Data/Intervention:    Patient Name:  Laz Stein  /Age:  1970 (52 year old)    Visit Type: telephone  Referral Source: [facility]  Reason for Referral:  Lodging Resources    Collaborated With:    -Patient  -Rebecca, [facility]     Psychosocial Information/Concerns:  Patient will be starting concurrent chemorads soon for about 6-7 weeks and is requesting lodging resources. Patient is vaccinated, but spouse is not therefore a stay at [facility] is not an option.    Intervention/Education/Resources Provided:  SW called patient, introduced self and explained the reason for calling. Patient aware of not being able to stay at [facility] due to spouse's vaccination status. SW shared that there are hotels/motels in the area that provide discounted rates to patients and families. Patient reported that their son looked into some places, but patient was hoping to find short term apartments since they will be here for approximately 7 weeks. SW talked with patient about [facility] and will reach out to Rebecca from accommodations about wait list. In the mean time SW will send patient an e-mail with [facility] info sheet and additional lodging resources.    Assessment/Plan:  SW connected with Rebecca who reported that there is currently three people on the waiting list, but if patients treatment isn't starting for another week and half they should be able to get in. SW notified patient via e-mail and encouraged patient to reach out to accommodations at [phone]. SW will continue to remain available as needed. Provided patient/family with contact information and availability.    AMAURI Holland,SW  Hematology/Oncology Social Worker  Phone:[phone] Pager: [phone]

## 2022-08-12 ENCOUNTER — PATIENT OUTREACH (OUTPATIENT)
Dept: ONCOLOGY | Facility: CLINIC | Age: 52
End: 2022-08-12

## 2022-08-12 ENCOUNTER — OFFICE VISIT (OUTPATIENT)
Dept: RADIATION ONCOLOGY | Facility: CLINIC | Age: 52
End: 2022-08-12
Attending: RADIOLOGY
Payer: COMMERCIAL

## 2022-08-12 ENCOUNTER — PRE VISIT (OUTPATIENT)
Dept: ONCOLOGY | Facility: CLINIC | Age: 52
End: 2022-08-12

## 2022-08-12 ENCOUNTER — ONCOLOGY VISIT (OUTPATIENT)
Dept: ONCOLOGY | Facility: CLINIC | Age: 52
End: 2022-08-12
Attending: NEUROLOGICAL SURGERY
Payer: COMMERCIAL

## 2022-08-12 ENCOUNTER — VIRTUAL VISIT (OUTPATIENT)
Dept: NEUROSURGERY | Facility: CLINIC | Age: 52
End: 2022-08-12
Attending: NEUROLOGICAL SURGERY
Payer: COMMERCIAL

## 2022-08-12 VITALS
SYSTOLIC BLOOD PRESSURE: 156 MMHG | OXYGEN SATURATION: 96 % | RESPIRATION RATE: 16 BRPM | HEART RATE: 75 BPM | BODY MASS INDEX: 25.7 KG/M2 | WEIGHT: 192.9 LBS | DIASTOLIC BLOOD PRESSURE: 88 MMHG

## 2022-08-12 VITALS
TEMPERATURE: 98.7 F | DIASTOLIC BLOOD PRESSURE: 80 MMHG | HEIGHT: 73 IN | HEART RATE: 80 BPM | SYSTOLIC BLOOD PRESSURE: 173 MMHG | OXYGEN SATURATION: 98 % | WEIGHT: 192 LBS | BODY MASS INDEX: 25.45 KG/M2

## 2022-08-12 DIAGNOSIS — C71.9 GLIOBLASTOMA (H): Primary | ICD-10-CM

## 2022-08-12 DIAGNOSIS — G93.89 BRAIN MASS: ICD-10-CM

## 2022-08-12 DIAGNOSIS — G40.909 SEIZURE DISORDER (H): ICD-10-CM

## 2022-08-12 PROCEDURE — G0463 HOSPITAL OUTPT CLINIC VISIT: HCPCS

## 2022-08-12 PROCEDURE — 99215 OFFICE O/P EST HI 40 MIN: CPT | Performed by: PSYCHIATRY & NEUROLOGY

## 2022-08-12 PROCEDURE — 77334 RADIATION TREATMENT AID(S): CPT | Performed by: RADIOLOGY

## 2022-08-12 PROCEDURE — 77334 RADIATION TREATMENT AID(S): CPT | Mod: 26 | Performed by: RADIOLOGY

## 2022-08-12 RX ORDER — ASPIRIN 325 MG
325 TABLET ORAL
COMMUNITY
End: 2022-08-12

## 2022-08-12 RX ORDER — AMLODIPINE BESYLATE 5 MG/1
TABLET ORAL
COMMUNITY
Start: 2022-02-21 | End: 2022-08-22

## 2022-08-12 RX ORDER — LEVETIRACETAM 1000 MG/1
1000 TABLET ORAL 2 TIMES DAILY
Qty: 60 TABLET | Refills: 11 | Status: SHIPPED | OUTPATIENT
Start: 2022-08-12 | End: 2022-10-20

## 2022-08-12 ASSESSMENT — ENCOUNTER SYMPTOMS
RESPIRATORY NEGATIVE: 1
NERVOUS/ANXIOUS: 1
MUSCULOSKELETAL NEGATIVE: 1
NEUROLOGICAL NEGATIVE: 1
GASTROINTESTINAL NEGATIVE: 1
EYES NEGATIVE: 1
CARDIOVASCULAR NEGATIVE: 1
CONSTITUTIONAL NEGATIVE: 1

## 2022-08-12 ASSESSMENT — PAIN SCALES - GENERAL
PAINLEVEL: NO PAIN (0)
PAINLEVEL: NO PAIN (0)

## 2022-08-12 NOTE — PROGRESS NOTES
Radiation Therapy Patient Education    Person involved with teaching: Patient and Wife    Patient educational needs for self management of treatment-related side effects assessment completed.  [facility] Patient Ed tab contains Patient Learning Assessment    Education Materials Given  Radiation Therapy and You    Educational Topics Discussed  Side effects expected, Pain management, Skin care, Activity, Nutrition and weight loss and When to call MD/RN    Response To Teaching  Verbalizes understanding    GYN Only  Vaginal Dilator-given and educated: N/A    Referrals sent: None    Chemotherapy?  Yes: with [facility], possibly on trial.

## 2022-08-12 NOTE — LETTER
2022         RE: Laz Stein  [address]  [facility] [address]        INITIAL PATIENT ASSESSMENT    Diagnosis: glioblastoma    Prior radiation therapy: None    Prior chemotherapy: None    Prior hormonal therapy:No    Pain Eval:  Denies    Psychosocial  Living arrangements: wife and 18 year old at home   Fall Risk: independent   referral needs: Not needed    Advanced Directive: No  Implantable Cardiac Device? No      LMP: No LMP for male patient  .  Nurse face-to-face time: Level 5:  over 15 min face to face time          Review of Systems   Constitutional: Negative.    HENT: Negative.    Eyes: Negative.    Respiratory: Negative.    Cardiovascular: Negative.    Gastrointestinal: Negative.    Genitourinary: Negative.    Musculoskeletal: Negative.    Skin: Negative.    Neurological: Negative.    Endo/Heme/Allergies: Negative.    Psychiatric/Behavioral: The patient is nervous/anxious.                     Department of Radiation Oncology  [address] [address]  [phone]       Consultation Note    Name: Laz Stein MRN: [id number]   : 1970   Date of Service: 2022  Referring: Dr. Shirin Stone / neuro-oncology     Reason for consultation: Left parietal glioblastoma, IDH wild-type, MGMT promoter unmethylated    History of Present Illness   2022: Presents to a local ED with word finding difficulties. Imaging demonstrates an expansile lesion within the left parietal lobe.    2022: Transferred to the [facility] for further evaluation    2022: MRI demonstrates a 4.3 x 3.5 x 3.3 cm enhancing heterogeneous mass within the left parietal lobe    2022: Undergoes a left craniotomy and 5 ALA guided tumor resection. Surgical pathology (specimen: [id number]) reveals an IDH wild-type, MGMT promoter unmethylated WHO grade 4 glioblastoma.    2022: Postoperative MRI reveals a gross total  resection    Past Medical History:    Hypertension    Glioblastoma    Past Surgical History:    Bilateral hip replacement    Right knee surgery    Stealth assisted craniotomy and tumor resection    Chemotherapy History:  None    Radiation History:  None    Pregnant: Not Applicable  Implanted Cardiac Devices: No    Medications:  Current Outpatient Medications   Medication Sig Dispense Refill     acetaminophen (TYLENOL) 325 MG tablet Take 2 tablets (650 mg) by mouth every 4 hours as needed for headaches or pain (For optimal non-opioid multimodal pain management to improve pain control.) 60 tablet 0     diazepam (VALIUM) 5 MG tablet Take 0.5 tablets (2.5 mg) by mouth nightly as needed for anxiety 5 tablet 0     levETIRAcetam (KEPPRA) 1000 MG tablet Take 1 tablet (1,000 mg) by mouth 2 times daily 60 tablet 11     amLODIPine (NORVASC) 5 MG tablet  (Patient not taking: Reported on 8/12/2022)        Allergies:    NKDA    Social History:  Tobacco: Never  Alcohol: Social  Employment: Works as a farmer  Lives in [place], [place]    Family History:    Father: Breast cancer    Brother: Prostate cancer    Paternal uncle: Prostate cancer    Review of Systems   A 10-point review of systems was performed. Pertinent positives include:     Mild anxiety    Physical Exam   ECOG Status: 0    Vitals:  Weight: 87.5 kg  B/P: 156/88  P: 75  Pain: 0/10    General: Healthy-appearing 52-year-old gentleman seated comfortably in a chair in no acute distress  HEENT: NC/AT. EOMI. No rhinorrhea or epistaxis.  Pulmonary: No wheezing, stridor or respiratory distress  Skin: Normal color and turgor  Neuro: A/O x3. 5/5 motor strength in bilateral upper/lower extremities. Normal fine motor control within the bilateral upper extremities. Normal gait.    Imaging/Path/Labs   Imaging: Reviewed    Path: Reviewed    Labs: Reviewed    Assessment    Mr. Stein is a 52 year old male with a newly diagnosed IDH wild-type, MGMT promoter unmethylated left parietal  glioblastoma status post gross total resection.    Plan   I had a long discussion with Mr. Stein and his wife regarding management of his recently resected glioblastoma. They have previously seen my neuro-oncology colleague, Dr. Shirin Stone, and are considering enrollment on the ongoing Denovo trial available at our institution.    From a radiotherapy standpoint, I discussed that the typical standard for adjuvant radiation would consist of a dose of 60 Gy/30 fractions delivered to the tumor bed and high risk brain parenchyma and that his decision, should he pursue clinical trial enrollment, would not affect the radiotherapy dosing or logistics. I additionally reviewed the potential acute and long-term toxicities associated with partial brain radiotherapy and Mr. Stein had several pertinent questions which were answered to his stated satisfaction. Informed consent was obtained in clinic.    At this time, Mr. Stein indicated that he wished to think about the clinical trial option offered to him by Dr. Stone and would contact us early next week once he had finalized a decision on whether to pursue this or not. Once this decision has been finalized, I will have him return to clinic for a CT and MRI guided treatment planning session in coordination with any trial-specific labs so as to minimize the number of trips he makes to our clinic given that he does live some distance from the [facility]. Finally, I provided him with my contact information and gave him instructions to call should he have any additional questions following our conversation today.    Carmelo Hernandez MD/PhD    Dept of Radiation Oncology  [facility]

## 2022-08-12 NOTE — NURSING NOTE
"Oncology Rooming Note    August 12, 2022 11:12 AM   Laz Stein is a 52 year old male who presents for:    Chief Complaint   Patient presents with     Oncology Clinic Visit     Brain tumor     Initial Vitals: BP (!) 173/80 (BP Location: Right arm, Patient Position: Sitting, Cuff Size: Adult Regular)   Pulse 80   Temp 98.7  F (37.1  C) (Oral)   Ht 1.845 m (6' 0.64\")   Wt 87.1 kg (192 lb)   SpO2 98%   BMI 25.58 kg/m   Estimated body mass index is 25.58 kg/m  as calculated from the following:    Height as of this encounter: 1.845 m (6' 0.64\").    Weight as of this encounter: 87.1 kg (192 lb). Body surface area is 2.11 meters squared.  No Pain (0) Comment: Data Unavailable   No LMP for male patient.  Allergies reviewed: Yes  Medications reviewed: Yes    Medications: Medication refills not needed today.  Pharmacy name entered into EPIC: Data Unavailable    Clinical concerns: Pt would like to discuss high blood pressure and getting back on high bp mediation       John Ritchie            "

## 2022-08-12 NOTE — LETTER
8/12/2022         RE: Laz Stein  [address]  [place] [address]        Dear Colleague,    Thank you for referring your patient, Laz Stein, to the [facility] CANCER [facility]INIC. Please see a copy of my visit note below.    NEURO-ONCOLOGY INITIAL VISIT  Aug 12, 2022    CHIEF COMPLAINT: Mr. 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 He is presenting to this initial clinic visit as referred by Dr. Reese for evaluation and recommendations on treatment.     Accompanying him to this visit is Tracey (wife).    HISTORY OF PRESENT ILLNESS  A summary of the patient s oncologic history is as follows;   -7/2022 PRESENTATION: Conductive aphasia, seizure activity.  -7/19/2022 MR brain imaging demonstrated a heterogeneously enhancing mixed solid and cystic mass in the left parietal lobe. Mild local mass effect and no midline shift.   -7/20/2022 SURGERY: Craniotomy with 5ALA-guided resection by Dr. Reese.   PATHOLOGY: Glioblastoma; IDH1, IDH2 wildtype. MGMT promoter unmethylated.    BRAF, PTEN, TP53 not mutated.   This infiltrating glioma shows palisading tumor necrosis and microvascular proliferation.  Post-operative MR brain imaging demonstrated gross total resection.  -8/12/2022 NEURO-ONC: Recommending upfront chemoradiotherapy on or off a clinical trial.      Today in clinic;   -Matt is doing well today. Recovered from surgery.  -Word finding is no longer an issue.   -No headaches.   -No other neuro focal deficit. No weakness or numbness. No changes in vision.  -Feeling anxious.   -Blood pressure is evaluated today in clinic, but denies any chest pain, shortness of breath, vision changes, or headache.  -Prior seizure history. No recurrent events. He is on Keppra 1000 mg twice daily.    -Off steroids.      MEDICATIONS   Current Outpatient Medications  "  Medication Sig Dispense Refill     levETIRAcetam (KEPPRA) 1000 MG tablet Take 1 tablet (1,000 mg) by mouth 2 times daily 60 tablet 11     acetaminophen (TYLENOL) 325 MG tablet Take 2 tablets (650 mg) by mouth every 4 hours as needed for headaches or pain (For optimal non-opioid multimodal pain management to improve pain control.) 60 tablet 0     amLODIPine (NORVASC) 5 MG tablet  (Patient not taking: Reported on 8/12/2022)       diazepam (VALIUM) 5 MG tablet Take 0.5 tablets (2.5 mg) by mouth nightly as needed for anxiety 5 tablet 0     DRUG ALLERGIES No Known Allergies      PHYSICAL EXAMINATION  BP (!) 173/80 (BP Location: Right arm, Patient Position: Sitting, Cuff Size: Adult Regular)   Pulse 80   Temp 98.7  F (37.1  C) (Oral)   Ht 1.845 m (6' 0.64\")   Wt 87.1 kg (192 lb)   SpO2 98%   BMI 25.58 kg/m     Wt Readings from Last 2 Encounters:   08/12/22 87.5 kg (192 lb 14.4 oz)   08/12/22 87.1 kg (192 lb)      Ht Readings from Last 2 Encounters:   08/12/22 1.845 m (6' 0.64\")   07/19/22 1.829 m (6')     KPS: 100    -Generally well appearing.  -Respiratory: Normal breath sounds, no audible wheezing.   -Skin: No rashes. Healing head incision.  -Hematologic/ lymphatic: No abnormal bruising. No leg swelling.  -Psychiatric: Normal mood and affect. Pleasant, talkative.  -Neurologic:   MENTAL STATUS:     Alert, oriented to date.    Recall: Intact.    Speech fluent.    Comprehension intact to multi-step commands.   Good right-left orientation.     CRANIAL NERVES:     Pupils are equal, round.    Extraocular movements full, denies diplopia.     Visual fields full.     Facial sensation intact to light touch.   Symmetric facial movements.   Hearing intact.   No dysarthria.  MOTOR:    No pronation or drift. No orbiting.   Able to rise from a chair without use of arms.   On toe/ heel walk, equal distance from floor to heels/ toes.   SENSATION: Intact to light touch throughout.  COORDINATION: Intact finger-nose with eyes open " and closed.   GAIT:  Walks without assistance.   Good speed. Normal stride length and heel strike. Normal turns. Normal arm swing.   Able to toe, heel walk. Able to tandem walk.       MEDICAL RECORDS  Obtained and personally reviewed all available outside medical records in addition to reviewing any records available in our electronic system.   -Hospitalization    LABS  Personally reviewed all available lab results; CBC, CMP, pathology.     IMAGING  Personally reviewed pre- and post-operative MR brain imaging from last month. To my eye, there was a gross total resection of the heterogeneously enhancing mixed solid and cystic mass in the left parietal lobe.    Imaging was shown to and results were reviewed with Ludy.       IMPRESSION  Clinic time for this high complexity encounter was spent discussing in detail the nature of his cancer, answering questions pertaining to my recommendations, and devising the treatment plan as outlined below. It was explained to Matt and Tracey that he has a type of brain cancer for which there is currently no cure. Therefore, cancer-directed treatment strives to slow further growth and increase the time interval to recurrence.     With regard to cancer-directed therapy, a multimodal treatment approach first involves attempting a maximum safe surgical resection. In Matt's case, a gross total resection was performed. Following surgery, standard of care for glioblastoma is chemoradiotherapy with temozolomide dosed at 75 mg/m2 daily concomitantly with radiation therapy. However, we also discussed the option of enrolling in a clinical trial. We discussed the risks, benefits, and alternatives of the Denovo clinical trial (A Randomized, Double-Blind, Placebo-Controlled Phase 3 Study of Enzastaurin Added to Temozolomide During and Following Radiation Therapy in Newly Diagnosed Glioblastoma Patients Who Possess the Novel Genomic Biomarker DGM1) as upfront therapy and Matt expressed  some interest in this investigational therapy option. I communicated this with the RN clinical trial coordinator who saw Matt today. RNCC to contact Matt on Monday to learn of his decision.       Risks/ benefits of temozolomide were reviewed and the following common, anticipated side effects of this treatment were discussed today including, but not limited to, fatigue, nausea, and constipation. Concomitant radiation can result in increased cerebral edema and therefore, symptoms of malaise and fatigue generally worsen, but do eventually improve. As a result, dexamethasone dosage may need to be increased. The combination therapy can result in bone marrow suppression; leukopenia and thrombocytopenia.     Regardless of clinical trial enrollment, the plan is for him to undergo radiation here. Matt is meeting with radiation oncology later today. I have communicated the above plan with Dr. Hernandez. Since Ludy live in [place], I alerted RNCC to work through options for lodging in the [facility] while he undergoes treatment.     Additionally, today we discussed the results of the cancer's IDH and MGMT promoter unmethylated status results and the impact it has on prognosis and response to therapy.     PROBLEM LIST  Glioblastoma  Aphasia    PLAN  -CANCER-DIRECTED THERAPY-  -As above; Will order temozolomide treatment plan pending decision for on-study or off-study treatmeat.   -Additional temozolomide teaching performed by RN/ pharmacy staff.  -Supportive medications;        -Anti-nausea: Compazine or Zofran (Zofran is not allowed on the clinical trial).       -For lymphopenia, prophylactic antibiotics are recommended during concurrent treatment: Sulfamethoxazole-trimethoprim (Bactrim) 800 mg-160 mg; 1 tab orally Monday, Wednesday, and Friday for pneumocystis prophylaxis.       -Bowel regimen: Docusate 100 mg; 1 cap orally 3 times a day (stool softener for constipation) and Senna 8.6 m tabs orally at bedtime  (laxative as needed for constipation).    -STEROIDS-  -Currently off dexamethasone.    -SEIZURE MANAGEMENT-  -Given history of seizures, will continue current antiepileptic regimen of Keppra for the foreseeable future.  -Refilled Keppra today.     -Quality of life/ MOOD/ FATIGUE-  -Denies any mood issues.  -Continue to monitor mood as untreated/ undertreated depression can worsen fatigue, dysorexia, and quality of life.    Return to clinic pending start of treatment.           Again, thank you for allowing me to participate in the care of your patient.      Sincerely,    Shirin Stone MD

## 2022-08-12 NOTE — LETTER
Date:August 12, 2022      Provider requested that no letter be sent. Do not send.       [facility]

## 2022-08-12 NOTE — PROGRESS NOTES
INITIAL PATIENT ASSESSMENT    Diagnosis: glioblastoma    Prior radiation therapy: None    Prior chemotherapy: None    Prior hormonal therapy:No    Pain Eval:  Denies    Psychosocial  Living arrangements: wife and 18 year old at home   Fall Risk: independent   referral needs: Not needed    Advanced Directive: No  Implantable Cardiac Device? No      LMP: No LMP for male patient  .  Nurse face-to-face time: Level 5:  over 15 min face to face time          Review of Systems   Constitutional: Negative.    HENT: Negative.    Eyes: Negative.    Respiratory: Negative.    Cardiovascular: Negative.    Gastrointestinal: Negative.    Genitourinary: Negative.    Musculoskeletal: Negative.    Skin: Negative.    Neurological: Negative.    Endo/Heme/Allergies: Negative.    Psychiatric/Behavioral: The patient is nervous/anxious.

## 2022-08-12 NOTE — LETTER
8/12/2022     RE: Laz Stein  [address]  [facility] [address]    Dear Colleague,    Thank you for referring your patient, Laz Stein, to the [facility] RADIATION ONCOLOGY. Please see a copy of my visit note below.    Radiation Therapy Patient Education    Person involved with teaching: Patient and Wife    Patient educational needs for self management of treatment-related side effects assessment completed.  EPIC Patient Ed tab contains Patient Learning Assessment    Education Materials Given  Radiation Therapy and You    Educational Topics Discussed  Side effects expected, Pain management, Skin care, Activity, Nutrition and weight loss and When to call MD/RN    Response To Teaching  Verbalizes understanding    GYN Only  Vaginal Dilator-given and educated: N/A    Referrals sent: None    Chemotherapy?  Yes: with [facility], possibly on trial.          Again, thank you for allowing me to participate in the care of your patient.        Sincerely,        Carmelo Hernandez MD

## 2022-08-12 NOTE — LETTER
8/12/2022         RE: Laz Stein  [address]  [place] [address]        Dear Colleague,    Thank you for referring your patient, Laz Stein, to the [facility] CANCER [facility]. Please see a copy of my visit note below.    Matt is a 52 year old who is being evaluated via a billable video visit.      How would you like to obtain your AVS? MyChart  If the video visit is dropped, the invitation should be resent by: Text to cell phone: [phone]   Will anyone else be joining your video visit? Yes: Tracey - wife. How would they like to receive their invitation? Text to cell phone: in person        Video-Visit Details    Video Start Time: 10:00 AM    Type of service:  Video Visit    Video End Time:10:15 AM    Originating Location (pt. Location): Home    Distant Location (provider location):  [facility]     Platform used for Video Visit: Omar Miguel    Neurosurgery Clinic Note    Post-operative visit    52 M s/p GTR of a left parietal glioblastoma on 7/20/2022. Post-operative course unremarkable. The patient was discharged home on POD1. The patient returns today for a post-operative visit.    No new complaints. The patient's pre-operative expressive aphasia has essentially resolved, excepting when the patient is fatigued.    Motor examination grossly non-focal on video. Incision is nicely healing.    AP: 52 M doing well after GTR of a left parietal glioblastoma.The patient will meet with Ted Petit and [facility] in terms of the next steps in his treatment. I will continue to follow the patient closely.            Again, thank you for allowing me to participate in the care of your patient.        Sincerely,        Tanner Reese MD

## 2022-08-12 NOTE — PROGRESS NOTES
[facility]: Cancer Care Initial Note                                    Discussion with Patient:                                                      Met with patient after clinic visit/consultation appt with Dr Stone.  We reviewed Dr. Stone's plan of care:      Introduced self and role of RN Care Coordinator at [facility]. Provided my contact information, [facility] phone number (which has options to talk with a Nurse available 24/7 - triage and RNCC via this option during business hours).      Reviewed current adjustments in clinic flow due to Covid-19 pandemic including addition of telemedicine visits, visitor restrictions, RNCCs working remotely at varying intervals, and Covid testing.     Reviewed [facility] care team members including advanced practice providers in oncology dept and usual clinic hours.     Provided overview of supportive services at [facility] including SW, oncology dietitian, oncology behavioral health providers (psychology, psychiatry, counseling), as well as the availability of [facility] as needed.     Reviewed [facility] Cancer Care Guidebook as resource for additional information including side effects management, clinic information as well as additional supportive care resources.         Reviewed appropriate use of MyChart, not to be used for symptom reporting.      Reviewed Auth to Discuss PHI form. Patient completed form.        Learning assessment:      Answered all questions to patient stated satisfaction.        Assessment:                                                      Initial  Current living arrangement:: I live in a private home with spouse  Informal Support system:: Children;Spouse;Family  Bed or wheelchair confined:: No  Mobility Status: Independent  Transportation means:: Family  Medication adherence problem (GOAL):: No  Knowledgeable about how to use meds:: Yes  Medication side effects suspected:: No  Advanced Care Plans/Directives on  file:: No  Advanced Care Plan/Directive Status: Considering Options  Patient Reported Pain?: No  Pain Score: No Pain (0)  (DVPRS) Pain Rating Score : No pain        Intervention/Education provided during outreach:                                                       Lakeshia Doyle RN, BSN  Oncology/Neurosurgery Care Coordinator  [facility]

## 2022-08-12 NOTE — PROGRESS NOTES
Matt is a 52 year old who is being evaluated via a billable video visit.      How would you like to obtain your AVS? MyChart  If the video visit is dropped, the invitation should be resent by: Text to cell phone: [phone]   Will anyone else be joining your video visit? Yes: Tracey - wife. How would they like to receive their invitation? Text to cell phone: in person        Video-Visit Details    Video Start Time: 10:00 AM    Type of service:  Video Visit    Video End Time:10:15 AM    Originating Location (pt. Location): Home    Distant Location (provider location):  [facility] CANCER [facility]     Platform used for Video Visit: Omar Miguel    Neurosurgery Clinic Note    Post-operative visit    52 M s/p GTR of a left parietal glioblastoma on 7/20/2022. Post-operative course unremarkable. The patient was discharged home on POD1. The patient returns today for a post-operative visit.    No new complaints. The patient's pre-operative expressive aphasia has essentially resolved, excepting when the patient is fatigued.    Motor examination grossly non-focal on video. Incision is nicely healing.    AP: 52 M doing well after GTR of a left parietal glioblastoma.The patient will meet with Ted Petit and [facility] in terms of the next steps in his treatment. I will continue to follow the patient closely.

## 2022-08-12 NOTE — PROGRESS NOTES
NEURO-ONCOLOGY INITIAL VISIT  Aug 12, 2022    CHIEF COMPLAINT: Mr. 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 He is presenting to this initial clinic visit as referred by Dr. Reese for evaluation and recommendations on treatment.     Accompanying him to this visit is Tracey (wife).    HISTORY OF PRESENT ILLNESS  A summary of the patient s oncologic history is as follows;   -7/2022 PRESENTATION: Conductive aphasia, seizure activity.  -7/19/2022 MR brain imaging demonstrated a heterogeneously enhancing mixed solid and cystic mass in the left parietal lobe. Mild local mass effect and no midline shift.   -7/20/2022 SURGERY: Craniotomy with 5ALA-guided resection by Dr. Reese.   PATHOLOGY: Glioblastoma; IDH1, IDH2 wildtype. MGMT promoter unmethylated.    BRAF, PTEN, TP53 not mutated.   This infiltrating glioma shows palisading tumor necrosis and microvascular proliferation.  Post-operative MR brain imaging demonstrated gross total resection.  -8/12/2022 NEURO-ONC: Recommending upfront chemoradiotherapy on or off a clinical trial.      Today in clinic;   -Matt is doing well today. Recovered from surgery.  -Word finding is no longer an issue.   -No headaches.   -No other neuro focal deficit. No weakness or numbness. No changes in vision.  -Feeling anxious.   -Blood pressure is evaluated today in clinic, but denies any chest pain, shortness of breath, vision changes, or headache.  -Prior seizure history. No recurrent events. He is on Keppra 1000 mg twice daily.    -Off steroids.      MEDICATIONS   Current Outpatient Medications   Medication Sig Dispense Refill     levETIRAcetam (KEPPRA) 1000 MG tablet Take 1 tablet (1,000 mg) by mouth 2 times daily 60 tablet 11     acetaminophen (TYLENOL) 325 MG tablet Take 2 tablets (650 mg) by mouth every 4 hours as needed for headaches or pain (For  "optimal non-opioid multimodal pain management to improve pain control.) 60 tablet 0     amLODIPine (NORVASC) 5 MG tablet  (Patient not taking: Reported on 8/12/2022)       diazepam (VALIUM) 5 MG tablet Take 0.5 tablets (2.5 mg) by mouth nightly as needed for anxiety 5 tablet 0     DRUG ALLERGIES No Known Allergies      PHYSICAL EXAMINATION  BP (!) 173/80 (BP Location: Right arm, Patient Position: Sitting, Cuff Size: Adult Regular)   Pulse 80   Temp 98.7  F (37.1  C) (Oral)   Ht 1.845 m (6' 0.64\")   Wt 87.1 kg (192 lb)   SpO2 98%   BMI 25.58 kg/m     Wt Readings from Last 2 Encounters:   08/12/22 87.5 kg (192 lb 14.4 oz)   08/12/22 87.1 kg (192 lb)      Ht Readings from Last 2 Encounters:   08/12/22 1.845 m (6' 0.64\")   07/19/22 1.829 m (6')     KPS: 100    -Generally well appearing.  -Respiratory: Normal breath sounds, no audible wheezing.   -Skin: No rashes. Healing head incision.  -Hematologic/ lymphatic: No abnormal bruising. No leg swelling.  -Psychiatric: Normal mood and affect. Pleasant, talkative.  -Neurologic:   MENTAL STATUS:     Alert, oriented to date.    Recall: Intact.    Speech fluent.    Comprehension intact to multi-step commands.   Good right-left orientation.     CRANIAL NERVES:     Pupils are equal, round.    Extraocular movements full, denies diplopia.     Visual fields full.     Facial sensation intact to light touch.   Symmetric facial movements.   Hearing intact.   No dysarthria.  MOTOR:    No pronation or drift. No orbiting.   Able to rise from a chair without use of arms.   On toe/ heel walk, equal distance from floor to heels/ toes.   SENSATION: Intact to light touch throughout.  COORDINATION: Intact finger-nose with eyes open and closed.   GAIT:  Walks without assistance.   Good speed. Normal stride length and heel strike. Normal turns. Normal arm swing.   Able to toe, heel walk. Able to tandem walk.       MEDICAL RECORDS  Obtained and personally reviewed all available outside " medical records in addition to reviewing any records available in our electronic system.   -Hospitalization    LABS  Personally reviewed all available lab results; CBC, CMP, pathology.     IMAGING  Personally reviewed pre- and post-operative MR brain imaging from last month. To my eye, there was a gross total resection of the heterogeneously enhancing mixed solid and cystic mass in the left parietal lobe.    Imaging was shown to and results were reviewed with Ludy.       IMPRESSION  Clinic time for this high complexity encounter was spent discussing in detail the nature of his cancer, answering questions pertaining to my recommendations, and devising the treatment plan as outlined below. It was explained to Matt and Tracey that he has a type of brain cancer for which there is currently no cure. Therefore, cancer-directed treatment strives to slow further growth and increase the time interval to recurrence.     With regard to cancer-directed therapy, a multimodal treatment approach first involves attempting a maximum safe surgical resection. In Matt's case, a gross total resection was performed. Following surgery, standard of care for glioblastoma is chemoradiotherapy with temozolomide dosed at 75 mg/m2 daily concomitantly with radiation therapy. However, we also discussed the option of enrolling in a clinical trial. We discussed the risks, benefits, and alternatives of the Denovo clinical trial (A Randomized, Double-Blind, Placebo-Controlled Phase 3 Study of Enzastaurin Added to Temozolomide During and Following Radiation Therapy in Newly Diagnosed Glioblastoma Patients Who Possess the Novel Genomic Biomarker DGM1) as upfront therapy and Matt expressed some interest in this investigational therapy option. I communicated this with the RN clinical trial coordinator who saw Matt today. RNCC to contact Matt on Monday to learn of his decision.       Risks/ benefits of temozolomide were reviewed and the  following common, anticipated side effects of this treatment were discussed today including, but not limited to, fatigue, nausea, and constipation. Concomitant radiation can result in increased cerebral edema and therefore, symptoms of malaise and fatigue generally worsen, but do eventually improve. As a result, dexamethasone dosage may need to be increased. The combination therapy can result in bone marrow suppression; leukopenia and thrombocytopenia.     Regardless of clinical trial enrollment, the plan is for him to undergo radiation here. Matt is meeting with radiation oncology later today. I have communicated the above plan with Dr. Hernandez. Since Ludy live in [place], I alerted RNCC to work through options for lodging in the [facility] while he undergoes treatment.     Additionally, today we discussed the results of the cancer's IDH and MGMT promoter unmethylated status results and the impact it has on prognosis and response to therapy.     PROBLEM LIST  Glioblastoma  Aphasia    PLAN  -CANCER-DIRECTED THERAPY-  -As above; Will order temozolomide treatment plan pending decision for on-study or off-study treatmeat.   -Additional temozolomide teaching performed by RN/ pharmacy staff.  -Supportive medications;        -Anti-nausea: Compazine or Zofran (Zofran is not allowed on the clinical trial).       -For lymphopenia, prophylactic antibiotics are recommended during concurrent treatment: Sulfamethoxazole-trimethoprim (Bactrim) 800 mg-160 mg; 1 tab orally Monday, Wednesday, and Friday for pneumocystis prophylaxis.       -Bowel regimen: Docusate 100 mg; 1 cap orally 3 times a day (stool softener for constipation) and Senna 8.6 m tabs orally at bedtime (laxative as needed for constipation).    -STEROIDS-  -Currently off dexamethasone.    -SEIZURE MANAGEMENT-  -Given history of seizures, will continue current antiepileptic regimen of Keppra for the foreseeable future.  -Refilled Keppra today.     -Quality  of life/ MOOD/ FATIGUE-  -Denies any mood issues.  -Continue to monitor mood as untreated/ undertreated depression can worsen fatigue, dysorexia, and quality of life.    Return to clinic pending start of treatment.     Sihrin Stone MD  Neuro-oncology

## 2022-08-13 NOTE — PROGRESS NOTES
Department of Radiation Oncology  [address] [address]  [phone]       Consultation Note    Name: Laz Stein MRN: [id number]   : 1970   Date of Service: 2022  Referring: Dr. Shirin Stone / neuro-oncology     Reason for consultation: Left parietal glioblastoma, IDH wild-type, MGMT promoter unmethylated    History of Present Illness   2022: Presents to a local ED with word finding difficulties. Imaging demonstrates an expansile lesion within the left parietal lobe.    2022: Transferred to the [facility] for further evaluation    2022: MRI demonstrates a 4.3 x 3.5 x 3.3 cm enhancing heterogeneous mass within the left parietal lobe    2022: Undergoes a left craniotomy and 5 ALA guided tumor resection. Surgical pathology (specimen: [id number]) reveals an IDH wild-type, MGMT promoter unmethylated WHO grade 4 glioblastoma.    2022: Postoperative MRI reveals a gross total resection    Past Medical History:    Hypertension    Glioblastoma    Past Surgical History:    Bilateral hip replacement    Right knee surgery    Stealth assisted craniotomy and tumor resection    Chemotherapy History:  None    Radiation History:  None    Pregnant: Not Applicable  Implanted Cardiac Devices: No    Medications:  Current Outpatient Medications   Medication Sig Dispense Refill     acetaminophen (TYLENOL) 325 MG tablet Take 2 tablets (650 mg) by mouth every 4 hours as needed for headaches or pain (For optimal non-opioid multimodal pain management to improve pain control.) 60 tablet 0     diazepam (VALIUM) 5 MG tablet Take 0.5 tablets (2.5 mg) by mouth nightly as needed for anxiety 5 tablet 0     levETIRAcetam (KEPPRA) 1000 MG tablet Take 1 tablet (1,000 mg) by mouth 2 times daily 60 tablet 11     amLODIPine (NORVASC) 5 MG tablet  (Patient not taking: Reported on 2022)        Allergies:    NKDA    Social  History:  Tobacco: Never  Alcohol: Social  Employment: Works as a farmer  Lives in [place], [place]    Family History:    Father: Breast cancer    Brother: Prostate cancer    Paternal uncle: Prostate cancer    Review of Systems   A 10-point review of systems was performed. Pertinent positives include:     Mild anxiety    Physical Exam   ECOG Status: 0    Vitals:  Weight: 87.5 kg  B/P: 156/88  P: 75  Pain: 0/10    General: Healthy-appearing 52-year-old gentleman seated comfortably in a chair in no acute distress  HEENT: NC/AT. EOMI. No rhinorrhea or epistaxis.  Pulmonary: No wheezing, stridor or respiratory distress  Skin: Normal color and turgor  Neuro: A/O x3. 5/5 motor strength in bilateral upper/lower extremities. Normal fine motor control within the bilateral upper extremities. Normal gait.    Imaging/Path/Labs   Imaging: Reviewed    Path: Reviewed    Labs: Reviewed    Assessment    Mr. Stein is a 52 year old male with a newly diagnosed IDH wild-type, MGMT promoter unmethylated left parietal glioblastoma status post gross total resection.    Plan   I had a long discussion with Mr. Stein and his wife regarding management of his recently resected glioblastoma. They have previously seen my neuro-oncology colleague, Dr. Shirin Stone, and are considering enrollment on the ongoing Denovo trial available at our institution.    From a radiotherapy standpoint, I discussed that the typical standard for adjuvant radiation would consist of a dose of 60 Gy/30 fractions delivered to the tumor bed and high risk brain parenchyma and that his decision, should he pursue clinical trial enrollment, would not affect the radiotherapy dosing or logistics. I additionally reviewed the potential acute and long-term toxicities associated with partial brain radiotherapy and Mr. Stein had several pertinent questions which were answered to his stated satisfaction. Informed consent was obtained in clinic.    At this time,   Sarita indicated that he wished to think about the clinical trial option offered to him by Dr. Stone and would contact us early next week once he had finalized a decision on whether to pursue this or not. Once this decision has been finalized, I will have him return to clinic for a CT and MRI guided treatment planning session in coordination with any trial-specific labs so as to minimize the number of trips he makes to our clinic given that he does live some distance from the [facility]. Finally, I provided him with my contact information and gave him instructions to call should he have any additional questions following our conversation today.    Carmelo Hernandez MD/PhD    Dept of Radiation Oncology  [facility]

## 2022-08-14 ENCOUNTER — HEALTH MAINTENANCE LETTER (OUTPATIENT)
Age: 52
End: 2022-08-14

## 2022-08-16 ENCOUNTER — RESEARCH ENCOUNTER (OUTPATIENT)
Dept: ONCOLOGY | Facility: CLINIC | Age: 52
End: 2022-08-16

## 2022-08-16 DIAGNOSIS — Z00.6 EXAMINATION OF PARTICIPANT OR CONTROL IN CLINICAL RESEARCH: ICD-10-CM

## 2022-08-16 DIAGNOSIS — C71.9 GLIOBLASTOMA (H): Primary | ICD-10-CM

## 2022-08-17 NOTE — NURSING NOTE
INFORMED CONSENT NOTE    Date: 8/16/2022    CONSENT:    The consent form, including purpose, risks and benefits, was reviewed with Laz Stein, and all questions were answered before he signed the consent form. The patient understands that the study involves an active treatment phase as well as a post-treatment follow up phase.     Present during the discussion was Matt, his wife Tracey for a portion of the consent, and Mikayla Murcia RN remotely via phone. A copy of the signed form was provided to the patient. No procedures specific to this study were performed prior to the patient signing the consent form.    Consent Version Date: [id number]  Consent obtained by: Mikayla Murcia RN  Consent Date: [id number]  HIPAA authorization signed?: Yes  HIPAA authorization version date: [id number]    Consent form was given to the participant: [id number]  The phone call occurred: 16AUG2022  Consent was signed by both participant and person obtaining consent: [id number]  Copy of the completed signed consent was sent to the participant over e-mail: [id number]    RACE AND ETHNICITY:    I discussed with Laz Stein that the [employer] (NCI) has asked that information on race and ethnicity be obtained from NCI-sponsored studies' participants whenever possible and that the purpose for this request is to assist the NCI in evaluating whether persons of all races and ethnicity are given the opportunity to participate in new cancer treatment options. It was explained that the information will be kept in a confidential file in the [facility] Clinical Trials Office and will be sent to the NCI in a way in which he cannot be identified. It was also explained that providing this information is voluntary.    Laz Stein provided the following responses:    Ethnicity: Non-    Race: White    Country of birth: [place]    Country of residence[place]    RESEARCH COORDINATOR:    Was the Research  Coordinator added to the patient's care team? Yes    Was the Research Coordinator's contact information entered into Epic? Yes    Mikayla Murcia, Clinical Research Coordinator, RN.  [facility], [facility]   Clinical Trials Office  Solid Tumor Unit

## 2022-08-19 ENCOUNTER — PATIENT OUTREACH (OUTPATIENT)
Dept: ONCOLOGY | Facility: CLINIC | Age: 52
End: 2022-08-19

## 2022-08-19 DIAGNOSIS — C71.9 GLIOBLASTOMA (H): Primary | ICD-10-CM

## 2022-08-19 DIAGNOSIS — Z00.6 EXAMINATION OF PARTICIPANT OR CONTROL IN CLINICAL RESEARCH: ICD-10-CM

## 2022-08-19 NOTE — PROGRESS NOTES
[facility]: Cancer Care                                                                                          Patient has requested a second opinion at [facility] in [place]. He is exploring his option of receiving treatment at [place] because he has a home and family in [place]. Records have been faxed to [facility] [phone]. Patient will schedule a virtual visit for a second opinion and notify us of his decision.    Lakeshia Doyle RN, BSN  Oncology/Neurosurgery Care Coordinator  [facility]

## 2022-08-21 NOTE — PROGRESS NOTES
"NEURO-ONCOLOGY VISIT  Aug 22, 2022    CHIEF COMPLAINT: Mr. 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 The plan is to initiate upfront chemoradiotherapy on or off a clinical trial. He has signed consent for the Randomized, Double-Blind, Placebo-Controlled Phase 3 Study of Enzastaurin Added to Temozolomide During and Following Radiation Therapy in Newly Diagnosed Glioblastoma Patients Who Possess the Novel Genomic Biomarker DGM1.    I met with Matt and today for his follow-up appointment.    HISTORY OF PRESENT ILLNESS  -Matt is doing well today.  -Noting some numbness/ tingling in the right leg; knee down and into the foot. Denies back pain.    -No issues with word finding.   -No headaches.   -No weakness.   -No changes in vision.  -No recurrent events. Remains on Keppra 1000 mg twice daily.    -Off steroids.  -Feeling overwhelmed at times with regard to his diagnosis.       MEDICATIONS   Current Outpatient Medications   Medication Sig Dispense Refill     levETIRAcetam (KEPPRA) 1000 MG tablet Take 1 tablet (1,000 mg) by mouth 2 times daily 60 tablet 11     DRUG ALLERGIES No Known Allergies      ONCOLOGIC HISTORY  -7/2022 PRESENTATION: Conductive aphasia, seizure activity.  -7/19/2022 MR brain imaging demonstrated a heterogeneously enhancing mixed solid and cystic mass in the left parietal lobe. Mild local mass effect and no midline shift.   -7/20/2022 SURGERY: Craniotomy with 5ALA-guided resection by Dr. Reese.   PATHOLOGY: Glioblastoma; IDH1, IDH2 wildtype. MGMT promoter unmethylated.    BRAF, PTEN, TP53 not mutated.   This infiltrating glioma shows palisading tumor necrosis and microvascular proliferation.  Post-operative MR brain imaging demonstrated gross total resection.  -8/12/2022 NEURO-ONC: Recommending upfront chemoradiotherapy on or off a clinical trial. " "  -8/22/2022 NEURO-ONC/ CLINICAL TRIAL: Clinically with numbness in the right leg. Screening labs. Planning MRI tomorrow.       PHYSICAL EXAMINATION  /77 (BP Location: Right arm, Patient Position: Sitting, Cuff Size: Adult Regular)   Pulse 77   Temp 98  F (36.7  C) (Oral)   Resp 18   Wt 85.6 kg (188 lb 12.8 oz)   SpO2 97%   BMI 25.16 kg/m     Wt Readings from Last 2 Encounters:   08/22/22 85.6 kg (188 lb 12.8 oz)   08/12/22 87.5 kg (192 lb 14.4 oz)      Ht Readings from Last 2 Encounters:   08/12/22 1.845 m (6' 0.64\")   07/19/22 1.829 m (6')     KPS: 100    -Generally well appearing.  -Respiratory: Normal breath sounds, no audible wheezing.   -Hematologic/ lymphatic: No leg swelling.  -Psychiatric: Normal mood and affect. Pleasant, talkative.  -Neurologic:   MENTAL STATUS:     Alert, oriented to date.    Recall: Intact.    Speech fluent.    Comprehension intact to multi-step commands.   Good right-left orientation.     CRANIAL NERVES:     Pupils are equal, round.    Extraocular movements full, denies diplopia.     Visual fields full.     Facial sensation intact to light touch.   Symmetric facial movements.   Hearing intact.   No dysarthria.  MOTOR:    No pronation or drift. No orbiting.   Able to rise from a chair without use of arms.   On toe/ heel walk, equal distance from floor to heels/ toes.   SENSATION: Noting tingling/ numbness in the right leg, but intact to light touch throughout.  COORDINATION: Intact finger-nose with eyes closed.   GAIT:  Walks without assistance.   Good speed. Normal stride length and heel strike. Normal turns. Normal arm swing.   Able to toe, heel walk. Able to tandem walk.       MEDICAL RECORDS  Obtained and personally reviewed all available outside medical records in addition to reviewing any records available in our electronic system.     LABS  Personally reviewed all available lab results; CBC, CMP, Hep B, Hep C, magnesium.     IMAGING  No new neuro-imaging to review.  "       IMPRESSION  Clinic time for this high complexity encounter was spent discussing in detail the nature of his cancer, answering questions pertaining to my recommendations, and devising the plan as outlined below.      With regard to cancer-directed therapy, we again reviewed the risks/ side effects and benefits of receiving standard of care chemoradiotherapy with temozolomide versus enrolling in the clinical trial; A Randomized, Double-Blind, Placebo-Controlled Phase 3 Study of Enzastaurin Added to Temozolomide During and Following Radiation Therapy in Newly Diagnosed Glioblastoma Patients Who Possess the Novel Genomic Biomarker DGM1. I discussed with Matt today that the benefit from treatment on a clinical trial is an unknown. In addition, he has a 50/50 chance of being randomized to the placebo arm in which he would receive just standard of care therapy. Madeline children live in [place] and they has a home in [place] located close to [facility]. They have an excellent support network in [place] plus he has the option of receiving treatment at a very good institution. I am in full support of their decision to either receive treatment here in [place] or in [place]. Following our discussion, Matt is wanting to move forward with treatment here. He has a planning scan tomorrow. If imaging is stable, will consider starting treatment on the clinical trial ~9/6. If there are any concerns on imaging, can consider starting treatment off the clinical trial on 8/30. I have communicated this plan with Dr. Hernandez. I have also communicated this plan the RN clinical trial coordinator and pharmacy. Of note, will investigate if treatment on the clinical trial can be transferred to another site at completion of chemoradiotherapy. The RNCC is also updated.        Risks/ benefits of temozolomide were reviewed and the following common, anticipated side effects of this treatment were discussed today including, but not limited to, fatigue, nausea,  and constipation. Concomitant radiation can result in increased cerebral edema and therefore, symptoms of malaise and fatigue generally worsen, but do eventually improve. As a result, dexamethasone dosage may need to be increased. The combination therapy can result in bone marrow suppression; leukopenia and thrombocytopenia.     For assistance in coping with his new cancer diagnosis, I will place a referral to Dr. Antonio Lane. Matt is in agreement.    Discussed the importance of eating a balanced diet. He is able to resume exercise activity without limitation.     PROBLEM LIST  Glioblastoma  Aphasia    PLAN  -CANCER-DIRECTED THERAPY-  -As above; Will order temozolomide.   -Additional temozolomide teaching performed by RN/ pharmacy staff.  -Supportive medications;        -Anti-nausea: Compazine (Zofran is not allowed on the clinical trial).       -For lymphopenia, prophylactic antibiotics are recommended during concurrent treatment: Sulfamethoxazole-trimethoprim (Bactrim) 800 mg-160 mg; 1 tab orally Monday, Wednesday, and Friday for pneumocystis prophylaxis.       -Bowel regimen: Docusate 100 mg; 1 cap orally 3 times a day (stool softener for constipation) and Senna 8.6 m tabs orally at bedtime (laxative as needed for constipation).    -STEROIDS-  -Currently off dexamethasone.    -SEIZURE MANAGEMENT-  -Given history of seizures, will continue current antiepileptic regimen of Keppra for the foreseeable future.  -On Keppra.     -Quality of life/ MOOD/ FATIGUE-  -Referral to Dr. Lane to assist with coping.   -Continue to monitor mood as untreated/ undertreated depression can worsen fatigue, dysorexia, and quality of life.    Return to clinic pending the clinical trial requirements.     Shirin Stone MD  Neuro-oncology

## 2022-08-22 ENCOUNTER — HOSPITAL ENCOUNTER (OUTPATIENT)
Dept: MRI IMAGING | Facility: CLINIC | Age: 52
Discharge: HOME | End: 2022-08-22
Attending: RADIOLOGY | Admitting: RADIOLOGY
Payer: COMMERCIAL

## 2022-08-22 ENCOUNTER — DOCUMENTATION ONLY (OUTPATIENT)
Dept: ONCOLOGY | Facility: CLINIC | Age: 52
End: 2022-08-22

## 2022-08-22 ENCOUNTER — TELEPHONE (OUTPATIENT)
Dept: ONCOLOGY | Facility: CLINIC | Age: 52
End: 2022-08-22

## 2022-08-22 ENCOUNTER — ONCOLOGY VISIT (OUTPATIENT)
Dept: ONCOLOGY | Facility: CLINIC | Age: 52
End: 2022-08-22
Attending: PSYCHIATRY & NEUROLOGY
Payer: COMMERCIAL

## 2022-08-22 ENCOUNTER — LAB (OUTPATIENT)
Dept: LAB | Facility: CLINIC | Age: 52
End: 2022-08-22
Attending: PSYCHIATRY & NEUROLOGY
Payer: COMMERCIAL

## 2022-08-22 ENCOUNTER — PATIENT OUTREACH (OUTPATIENT)
Dept: ONCOLOGY | Facility: CLINIC | Age: 52
End: 2022-08-22

## 2022-08-22 ENCOUNTER — RESEARCH ENCOUNTER (OUTPATIENT)
Dept: ONCOLOGY | Facility: CLINIC | Age: 52
End: 2022-08-22

## 2022-08-22 VITALS
OXYGEN SATURATION: 97 % | DIASTOLIC BLOOD PRESSURE: 77 MMHG | SYSTOLIC BLOOD PRESSURE: 129 MMHG | TEMPERATURE: 98 F | WEIGHT: 188.8 LBS | HEART RATE: 77 BPM | BODY MASS INDEX: 25.16 KG/M2 | RESPIRATION RATE: 18 BRPM

## 2022-08-22 DIAGNOSIS — Z00.6 EXAMINATION OF PARTICIPANT OR CONTROL IN CLINICAL RESEARCH: ICD-10-CM

## 2022-08-22 DIAGNOSIS — C71.9 GLIOBLASTOMA (H): Primary | ICD-10-CM

## 2022-08-22 DIAGNOSIS — C71.9 GLIOBLASTOMA (H): ICD-10-CM

## 2022-08-22 DIAGNOSIS — R45.89 DIFFICULTY COPING: ICD-10-CM

## 2022-08-22 DIAGNOSIS — Z91.89 HIGH RISK FOR CHEMOTHERAPY-INDUCED INFECTIOUS COMPLICATION: ICD-10-CM

## 2022-08-22 LAB
ALBUMIN SERPL-MCNC: 3.7 G/DL (ref 3.4–5)
ALBUMIN UR-MCNC: NEGATIVE MG/DL
ALP SERPL-CCNC: 72 U/L (ref 40–150)
ALT SERPL W P-5'-P-CCNC: 21 U/L (ref 0–70)
ANION GAP SERPL CALCULATED.3IONS-SCNC: 7 MMOL/L (ref 3–14)
APPEARANCE UR: CLEAR
AST SERPL W P-5'-P-CCNC: 18 U/L (ref 0–45)
BASOPHILS # BLD AUTO: 0.1 10E3/UL (ref 0–0.2)
BASOPHILS NFR BLD AUTO: 1 %
BILIRUB DIRECT SERPL-MCNC: 0.2 MG/DL (ref 0–0.2)
BILIRUB SERPL-MCNC: 0.6 MG/DL (ref 0.2–1.3)
BILIRUB UR QL STRIP: NEGATIVE
BUN SERPL-MCNC: 13 MG/DL (ref 7–30)
CALCIUM SERPL-MCNC: 8.9 MG/DL (ref 8.5–10.1)
CHLORIDE BLD-SCNC: 108 MMOL/L (ref 94–109)
CO2 SERPL-SCNC: 27 MMOL/L (ref 20–32)
COLOR UR AUTO: YELLOW
CREAT SERPL-MCNC: 0.89 MG/DL (ref 0.66–1.25)
EOSINOPHIL # BLD AUTO: 0.1 10E3/UL (ref 0–0.7)
EOSINOPHIL NFR BLD AUTO: 2 %
ERYTHROCYTE [DISTWIDTH] IN BLOOD BY AUTOMATED COUNT: 12.7 % (ref 10–15)
GFR SERPL CREATININE-BSD FRML MDRD: >90 ML/MIN/1.73M2
GLUCOSE BLD-MCNC: 102 MG/DL (ref 70–99)
GLUCOSE UR STRIP-MCNC: NEGATIVE MG/DL
HBV CORE AB SERPL QL IA: NONREACTIVE
HBV SURFACE AG SERPL QL IA: NONREACTIVE
HCT VFR BLD AUTO: 46.4 % (ref 40–53)
HCV AB SERPL QL IA: NONREACTIVE
HGB BLD-MCNC: 15.1 G/DL (ref 13.3–17.7)
HGB UR QL STRIP: NEGATIVE
IMM GRANULOCYTES # BLD: 0.1 10E3/UL
IMM GRANULOCYTES NFR BLD: 1 %
KETONES UR STRIP-MCNC: NEGATIVE MG/DL
LEUKOCYTE ESTERASE UR QL STRIP: NEGATIVE
LYMPHOCYTES # BLD AUTO: 1 10E3/UL (ref 0.8–5.3)
LYMPHOCYTES NFR BLD AUTO: 15 %
MAGNESIUM SERPL-MCNC: 2.4 MG/DL (ref 1.6–2.3)
MCH RBC QN AUTO: 29.3 PG (ref 26.5–33)
MCHC RBC AUTO-ENTMCNC: 32.5 G/DL (ref 31.5–36.5)
MCV RBC AUTO: 90 FL (ref 78–100)
MONOCYTES # BLD AUTO: 0.6 10E3/UL (ref 0–1.3)
MONOCYTES NFR BLD AUTO: 8 %
MUCOUS THREADS #/AREA URNS LPF: PRESENT /LPF
NEUTROPHILS # BLD AUTO: 5.1 10E3/UL (ref 1.6–8.3)
NEUTROPHILS NFR BLD AUTO: 73 %
NITRATE UR QL: NEGATIVE
NRBC # BLD AUTO: 0 10E3/UL
NRBC BLD AUTO-RTO: 0 /100
PH UR STRIP: 5 [PH] (ref 5–7)
PLATELET # BLD AUTO: 271 10E3/UL (ref 150–450)
POTASSIUM BLD-SCNC: 4.2 MMOL/L (ref 3.4–5.3)
PROT SERPL-MCNC: 7.1 G/DL (ref 6.8–8.8)
RBC # BLD AUTO: 5.16 10E6/UL (ref 4.4–5.9)
RBC URINE: <1 /HPF
SODIUM SERPL-SCNC: 142 MMOL/L (ref 133–144)
SP GR UR STRIP: 1.02 (ref 1–1.03)
UROBILINOGEN UR STRIP-MCNC: NORMAL MG/DL
WBC # BLD AUTO: 7 10E3/UL (ref 4–11)
WBC URINE: <1 /HPF

## 2022-08-22 PROCEDURE — 80053 COMPREHEN METABOLIC PANEL: CPT

## 2022-08-22 PROCEDURE — 70552 MRI BRAIN STEM W/DYE: CPT | Mod: 26 | Performed by: RADIOLOGY

## 2022-08-22 PROCEDURE — A9585 GADOBUTROL INJECTION: HCPCS | Performed by: RADIOLOGY

## 2022-08-22 PROCEDURE — 85025 COMPLETE CBC W/AUTO DIFF WBC: CPT

## 2022-08-22 PROCEDURE — 255N000002 HC RX 255 OP 636: Performed by: RADIOLOGY

## 2022-08-22 PROCEDURE — 70552 MRI BRAIN STEM W/DYE: CPT

## 2022-08-22 PROCEDURE — 99001 SPECIMEN HANDLING PT-LAB: CPT

## 2022-08-22 PROCEDURE — 86803 HEPATITIS C AB TEST: CPT

## 2022-08-22 PROCEDURE — 99215 OFFICE O/P EST HI 40 MIN: CPT | Mod: 24 | Performed by: PSYCHIATRY & NEUROLOGY

## 2022-08-22 PROCEDURE — G0463 HOSPITAL OUTPT CLINIC VISIT: HCPCS

## 2022-08-22 PROCEDURE — 87340 HEPATITIS B SURFACE AG IA: CPT

## 2022-08-22 PROCEDURE — 86704 HEP B CORE ANTIBODY TOTAL: CPT

## 2022-08-22 PROCEDURE — 36415 COLL VENOUS BLD VENIPUNCTURE: CPT

## 2022-08-22 PROCEDURE — 83735 ASSAY OF MAGNESIUM: CPT

## 2022-08-22 PROCEDURE — 82248 BILIRUBIN DIRECT: CPT

## 2022-08-22 PROCEDURE — 81001 URINALYSIS AUTO W/SCOPE: CPT

## 2022-08-22 RX ORDER — GADOBUTROL 604.72 MG/ML
10 INJECTION INTRAVENOUS ONCE
Status: COMPLETED | OUTPATIENT
Start: 2022-08-22 | End: 2022-08-22

## 2022-08-22 RX ORDER — SULFAMETHOXAZOLE/TRIMETHOPRIM 800-160 MG
1 TABLET ORAL
Qty: 18 TABLET | Refills: 0 | Status: SHIPPED | OUTPATIENT
Start: 2022-08-22 | End: 2022-10-18

## 2022-08-22 RX ADMIN — GADOBUTROL 8.5 ML: 604.72 INJECTION INTRAVENOUS at 12:22

## 2022-08-22 ASSESSMENT — PAIN SCALES - GENERAL: PAINLEVEL: NO PAIN (0)

## 2022-08-22 NOTE — TELEPHONE ENCOUNTER
PA Initiation    Medication: TEMODAR 20MG -PENDING  TEMODAR 20MG -PENDING  Insurance Company: [facility] - Phone [phone] Fax [phone]  Pharmacy Filling the Rx:    Filling Pharmacy Phone:    Filling Pharmacy Fax:    Start Date: 8/22/2022

## 2022-08-22 NOTE — NURSING NOTE
[facility] CLINICAL TRIAL VISIT NOTE      Date of Visit: 8/22/2022     Study: A Randomized, Double-Blind, Placebo-Controlled Phase 3 Study of Enzastaurin Added to Temozolomide During and Following Radiation Therapy in Newly Diagnosed Glioblastoma Patients Who Possess the Novel Genomic Biomarker DGM1     [facility]#: 2020   [facility]#: [id number]  IDS#: [id number]  Subject Name: Laz Stein  Subject ID: [id number]     Visit: Screening Visit     Did the study visit occur within the appropriate window allowed by the protocol? Yes     Clinical Trial Treatment History:  -7/2022 PRESENTATION: Conductive aphasia, seizure activity.  -7/19/2022 MR brain imaging demonstrated a heterogeneously enhancing mixed solid and cystic mass in the left parietal lobe. Mild local mass effect and no midline shift.   -7/20/2022 SURGERY: Craniotomy with 5ALA-guided resection by Dr. Reese.   PATHOLOGY: Glioblastoma; IDH1, IDH2 wildtype. MGMT promoter unmethylated.               BRAF, PTEN, TP53 not mutated.              This infiltrating glioma shows palisading tumor necrosis and microvascular proliferation.  Post-operative MR brain imaging demonstrated gross total resection.  -8/12/2022 NEURO-ONC: Recommending upfront chemoradiotherapy on or off a clinical trial.   -8/22/2022 NEURO-ONC/ CLINICAL TRIAL:Clinically with numbness in the right leg. Screening labs. Planning MRI tomorrow.      Vitals:   /77 (BP Location: Right arm, Patient Position: Sitting, Cuff Size: Adult Regular)   Pulse 77   Temp 98  F (36.7  C) (Oral)   Resp 18   Wt 85.6 kg (188 lb 12.8 oz)   SpO2 97%   BMI 25.16 kg/m       - Hypertension: Grade 1  - All criteria grade 0/normal.     Baseline Weight: 85.6 kg    EKG completed.    Abnormal Assessment Findings Per Dr. Stone and AE Grading:      Noting tingling/ numbness in the right leg, but intact to light touch throughout.   - Paresthesia: Grade 1    - All other criteria grade 0/normal.     I have personally interviewed Laz LEWIS  Sarita and reviewed his medical record for adverse events and these have been recorded on the corresponding logs in Laz Stein's research file.      CONCOMITANT MEDICATIONS:   I have personally reviewed concomitant medications and these have been recorded on the corresponding logs in Matt's research file.  See full list of medications below:     Current Outpatient Medications   Medication Sig Dispense Refill     acetaminophen (TYLENOL) 325 MG tablet Take 2 tablets (650 mg) by mouth every 4 hours as needed for headaches or pain (For optimal non-opioid multimodal pain management to improve pain control.) (Patient not taking: Reported on 8/22/2022) 60 tablet 0     amLODIPine (NORVASC) 5 MG tablet  (Patient not taking: No sig reported)       diazepam (VALIUM) 5 MG tablet Take 0.5 tablets (2.5 mg) by mouth nightly as needed for anxiety (Patient not taking: Reported on 8/22/2022) 5 tablet 0     levETIRAcetam (KEPPRA) 1000 MG tablet Take 1 tablet (1,000 mg) by mouth 2 times daily 60 tablet 11         DRUG ALLERGIES:    No Known Allergies    LAB RESULTS:  Labs including CBC, CMP, Mag, Direct Bili, Hepatitis labs, UA, and research labs were drawn. Labs were reviewed- any significant lab values were addressed and reviewed.       - All criteria grade 0/normal or abnormal but not accompanied by clinical symptoms or leading to treatment changes, additional therapies, medical interventions, or additional testing or not scored per CTCAE criteria.     Component      Latest Ref Rng & Units 8/22/2022   Glucose      70 - 99 mg/dL 102 (H)   Mucus Urine      None Seen /LPF Present (A)   Magnesium      1.6 - 2.3 mg/dL 2.4 (H)     Performance Status (KPS): 100    Percentage    100 Normal, no complaints, no evidence of disease   90 Able to carry on normal activity; minor signs or symptoms of disease   80 Normal activity with effort; some signs or symptoms of disease   70 Cares for self; unable to carry on normal activity or do  active work   60 Requires occasional assistance, but is able to care for most of his/her needs   50 Requires considerable assistance and frequent medical care   40 Disabled; requires special care and assistance   30 Severely disabled, hospitalization indicated. Death not imminent   20 Very sick, hospitalization necessary, active supportive treatment necessary   10 Moribund, fatal processes, progressing rapidly   0 Dead     Overall Treatment Plan for Clinical Trial:   This is a randomized controlled Phase 3 trial evaluating the effect of enzastaurin in patients with glioblastoma, where the primary endpoint is OS. Patients with newly diagnosed glioblastoma who meet all eligibility criteria will be enrolled. Randomization will occur within approximately 6 weeks after resection of the glioblastoma. Study treatment (Day 1) is recommended to begin on the same day or no later than 3 days after randomization. The 3 phases of the study are the same as in the run-in part except for patients receiving placebo in place of enzastaurin. Patients will be randomized 1:1 to Arm A: RT plus temozolomide and enzastaurin for 6 weeks, ending with the last dose of RT and Day 42 of temozolomide and enzastaurin therapy (Concurrent Phase), followed by enzastaurin alone for 21 to 35 days (Single-Agent Phase), then temozolomide for 6 cycles (or up to 12 cycles according to SOC) and enzastaurin up to a total of 24 cycles (1 cycle = 28 days) (Adjuvant Phase); or Arm B: RT plus temozolomide and placebo for 6 weeks, ending with Day 42 of temozolomide and placebo therapy (Concurrent Phase) followed by placebo alone for 21 to 35 days (Single-Agent Phase), then temozolomide for 6 cycles (or up to 12 cycles according to SOC) and placebo up to a total of 24 cycles (1 cycle = 28 days) (Adjuvant Phase).     Since temozolomide should be administered on an empty stomach or at least 2 hours after a meal and enzastaurin/placebo should be administered within  30 minutes after a meal, patients should take temozolomide on an empty stomach, after an appropriate wait (recommend ~60 minutes) eat a meal, and then take the enzastaurin/placebo dose. Alternatively, patients may take temozolomide at bedtime and enzastaurin/placebo in the morning with or within 30 minutes after breakfast.      Research RN Impression:   Matt is here for a screening visit. He will have an MRI and would like to know if delaying treatment by a week will make a difference. He is still considering all his options and will let us know what decision he makes. Tingling right lower leg. No weakness.    Laz Stein was given the opportunity to ask any trial related questions. The patient will be back for an MRI. The patient knows to call with any new or worsening symptoms or concerns.      Please see provider progress note for full physical exam and other clinical information.      Mikyala Murcia, Clinical Research Coordinator, RN.  [facility], [facility]   Clinical Trials Office  Solid Tumor Unit

## 2022-08-22 NOTE — TELEPHONE ENCOUNTER
PA Initiation    Medication: TEMODAR 140MG -PENDING  TEMODAR 20MG -PENDING  Insurance Company: [facility] - Phone [phone] Fax [phone]  Pharmacy Filling the Rx:    Filling Pharmacy Phone:    Filling Pharmacy Fax:    Start Date: 8/22/2022

## 2022-08-22 NOTE — NURSING NOTE
"Oncology Rooming Note    August 22, 2022 6:52 AM   Laz Stein is a 52 year old male who presents for:    Chief Complaint   Patient presents with     Oncology Clinic Visit     Glioblastoma     Initial Vitals: /77 (BP Location: Right arm, Patient Position: Sitting, Cuff Size: Adult Regular)   Pulse 77   Temp 98  F (36.7  C) (Oral)   Resp 18   Wt 85.6 kg (188 lb 12.8 oz)   SpO2 97%   BMI 25.16 kg/m   Estimated body mass index is 25.16 kg/m  as calculated from the following:    Height as of 8/12/22: 1.845 m (6' 0.64\").    Weight as of this encounter: 85.6 kg (188 lb 12.8 oz). Body surface area is 2.09 meters squared.  No Pain (0) Comment: Data Unavailable   No LMP for male patient.  Allergies reviewed: Yes  Medications reviewed: Yes    Medications: Medication refills not needed today.  Pharmacy name entered into EPIC: Data Unavailable    Clinical concerns: N/A       Angelica Castano CMA              "

## 2022-08-22 NOTE — LETTER
"    8/22/2022         RE: Laz Stein  [address]  [facility] [address]        Dear Colleague,    Thank you for referring your patient, Laz Stein, to the [facility]. Please see a copy of my visit note below.    NEURO-ONCOLOGY VISIT  Aug 22, 2022    CHIEF COMPLAINT: Mr. 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 The plan is to initiate upfront chemoradiotherapy on or off a clinical trial. He has signed consent for the Randomized, Double-Blind, Placebo-Controlled Phase 3 Study of Enzastaurin Added to Temozolomide During and Following Radiation Therapy in Newly Diagnosed Glioblastoma Patients Who Possess the Novel Genomic Biomarker DGM1.    I met with Matt and today for his follow-up appointment.    HISTORY OF PRESENT ILLNESS  -Matt is doing well today.  -Noting some numbness/ tingling in the right leg; knee down and into the foot. Denies back pain.    -No issues with word finding.   -No headaches.   -No weakness.   -No changes in vision.  -No recurrent events. Remains on Keppra 1000 mg twice daily.    -Off steroids.  -Feeling overwhelmed at times with regard to his diagnosis.       MEDICATIONS   Current Outpatient Medications   Medication Sig Dispense Refill     levETIRAcetam (KEPPRA) 1000 MG tablet Take 1 tablet (1,000 mg) by mouth 2 times daily 60 tablet 11     DRUG ALLERGIES No Known Allergies      ONCOLOGIC HISTORY  -7/2022 PRESENTATION: Conductive aphasia, seizure activity.  -7/19/2022 MR brain imaging demonstrated a heterogeneously enhancing mixed solid and cystic mass in the left parietal lobe. Mild local mass effect and no midline shift.   -7/20/2022 SURGERY: Craniotomy with 5ALA-guided resection by Dr. Reese.   PATHOLOGY: Glioblastoma; IDH1, IDH2 wildtype. MGMT promoter unmethylated.    BRAF, PTEN, TP53 not " "mutated.   This infiltrating glioma shows palisading tumor necrosis and microvascular proliferation.  Post-operative MR brain imaging demonstrated gross total resection.  -8/12/2022 NEURO-ONC: Recommending upfront chemoradiotherapy on or off a clinical trial.   -8/22/2022 NEURO-ONC/ CLINICAL TRIAL: Clinically with numbness in the right leg. Screening labs. Planning MRI tomorrow.       PHYSICAL EXAMINATION  /77 (BP Location: Right arm, Patient Position: Sitting, Cuff Size: Adult Regular)   Pulse 77   Temp 98  F (36.7  C) (Oral)   Resp 18   Wt 85.6 kg (188 lb 12.8 oz)   SpO2 97%   BMI 25.16 kg/m     Wt Readings from Last 2 Encounters:   08/22/22 85.6 kg (188 lb 12.8 oz)   08/12/22 87.5 kg (192 lb 14.4 oz)      Ht Readings from Last 2 Encounters:   08/12/22 1.845 m (6' 0.64\")   07/19/22 1.829 m (6')     KPS: 100    -Generally well appearing.  -Respiratory: Normal breath sounds, no audible wheezing.   -Hematologic/ lymphatic: No leg swelling.  -Psychiatric: Normal mood and affect. Pleasant, talkative.  -Neurologic:   MENTAL STATUS:     Alert, oriented to date.    Recall: Intact.    Speech fluent.    Comprehension intact to multi-step commands.   Good right-left orientation.     CRANIAL NERVES:     Pupils are equal, round.    Extraocular movements full, denies diplopia.     Visual fields full.     Facial sensation intact to light touch.   Symmetric facial movements.   Hearing intact.   No dysarthria.  MOTOR:    No pronation or drift. No orbiting.   Able to rise from a chair without use of arms.   On toe/ heel walk, equal distance from floor to heels/ toes.   SENSATION: Noting tingling/ numbness in the right leg, but intact to light touch throughout.  COORDINATION: Intact finger-nose with eyes closed.   GAIT:  Walks without assistance.   Good speed. Normal stride length and heel strike. Normal turns. Normal arm swing.   Able to toe, heel walk. Able to tandem walk.       MEDICAL RECORDS  Obtained and personally " reviewed all available outside medical records in addition to reviewing any records available in our electronic system.     LABS  Personally reviewed all available lab results; CBC, CMP, Hep B, Hep C, magnesium.     IMAGING  No new neuro-imaging to review.        IMPRESSION  Clinic time for this high complexity encounter was spent discussing in detail the nature of his cancer, answering questions pertaining to my recommendations, and devising the plan as outlined below.      With regard to cancer-directed therapy, we again reviewed the risks/ side effects and benefits of receiving standard of care chemoradiotherapy with temozolomide versus enrolling in the clinical trial; A Randomized, Double-Blind, Placebo-Controlled Phase 3 Study of Enzastaurin Added to Temozolomide During and Following Radiation Therapy in Newly Diagnosed Glioblastoma Patients Who Possess the Novel Genomic Biomarker DGM1. I discussed with Matt today that the benefit from treatment on a clinical trial is an unknown. In addition, he has a 50/50 chance of being randomized to the placebo arm in which he would receive just standard of care therapy. Madeline children live in [place] and they has a home in [place] located close to [facility]. They have an excellent support network in [place] plus he has the option of receiving treatment at a very good institution. I am in full support of their decision to either receive treatment here in [place] or in [place]. Following our discussion, Matt is wanting to move forward with treatment here. He has a planning scan tomorrow. If imaging is stable, will consider starting treatment on the clinical trial ~9/6. If there are any concerns on imaging, can consider starting treatment off the clinical trial on 8/30. I have communicated this plan with Dr. Hernandez. I have also communicated this plan the RN clinical trial coordinator and pharmacy. Of note, will investigate if treatment on the clinical trial can be transferred to another  site at completion of chemoradiotherapy. The RNCC is also updated.        Risks/ benefits of temozolomide were reviewed and the following common, anticipated side effects of this treatment were discussed today including, but not limited to, fatigue, nausea, and constipation. Concomitant radiation can result in increased cerebral edema and therefore, symptoms of malaise and fatigue generally worsen, but do eventually improve. As a result, dexamethasone dosage may need to be increased. The combination therapy can result in bone marrow suppression; leukopenia and thrombocytopenia.     For assistance in coping with his new cancer diagnosis, I will place a referral to Dr. Antonio Lane. Matt is in agreement.    Discussed the importance of eating a balanced diet. He is able to resume exercise activity without limitation.     PROBLEM LIST  Glioblastoma  Aphasia    PLAN  -CANCER-DIRECTED THERAPY-  -As above; Will order temozolomide.   -Additional temozolomide teaching performed by RN/ pharmacy staff.  -Supportive medications;        -Anti-nausea: Compazine (Zofran is not allowed on the clinical trial).       -For lymphopenia, prophylactic antibiotics are recommended during concurrent treatment: Sulfamethoxazole-trimethoprim (Bactrim) 800 mg-160 mg; 1 tab orally Monday, Wednesday, and Friday for pneumocystis prophylaxis.       -Bowel regimen: Docusate 100 mg; 1 cap orally 3 times a day (stool softener for constipation) and Senna 8.6 m tabs orally at bedtime (laxative as needed for constipation).    -STEROIDS-  -Currently off dexamethasone.    -SEIZURE MANAGEMENT-  -Given history of seizures, will continue current antiepileptic regimen of Keppra for the foreseeable future.  -On Keppra.     -Quality of life/ MOOD/ FATIGUE-  -Referral to Dr. Lane to assist with coping.   -Continue to monitor mood as untreated/ undertreated depression can worsen fatigue, dysorexia, and quality of life.    Return to clinic pending the  clinical trial requirements.         Again, thank you for allowing me to participate in the care of your patient.      Sincerely,    Shirin Stone MD

## 2022-08-22 NOTE — NURSING NOTE
Chief Complaint   Patient presents with     Labs Only     Labs drawn by RN via .     Labs collected from venipuncture by RN. Vitals taken. Checked in for appointment(s).    Shey Moreno RN

## 2022-08-23 NOTE — TELEPHONE ENCOUNTER
Prior Authorization Approval    Authorization Effective Date: 8/22/2022  Authorization Expiration Date: 8/22/2023  Medication: TEMODAR 140MG -Approved  TEMODAR 20MG -Approved  Approved Dose/Quantity: 30/30  Reference #:[id number]/A     Insurance Company: [employer] [facility] [place] - Phone [phone] Fax [phone]  Expected CoPay: $0     CoPay Card Available: Not needed     Foundation Assistance Needed:No    Which Pharmacy is filling the prescription (Not needed for infusion/clinic administered): [facility] - [place], [place] - [address] [place] [address] [facility]  Pharmacy Notified:    Patient Notified:      PA letter for 140mg has not been received at this time, nor is CMM updated to show an approval. Will update encounter once obtained

## 2022-08-23 NOTE — TELEPHONE ENCOUNTER
Prior Authorization Approval    Authorization Effective Date: 8/22/2022  Authorization Expiration Date: 8/22/2023  Medication: TEMODAR 140MG -Approved  TEMODAR 20MG -Approved  Approved Dose/Quantity: 30/30  Reference #: [id number]/A    Insurance Company: [employer] [place] - Phone [phone] Fax [phone]  Expected CoPay: $0      CoPay Card Available: Not needed     Foundation Assistance Needed: No  Which Pharmacy is filling the prescription (Not needed for infusion/clinic administered): [facility] - [place], [place] - [address]  Pharmacy Notified:    Patient Notified:

## 2022-08-25 ENCOUNTER — TELEPHONE (OUTPATIENT)
Dept: ONCOLOGY | Facility: CLINIC | Age: 52
End: 2022-08-25

## 2022-08-25 DIAGNOSIS — R11.2 CHEMOTHERAPY-INDUCED NAUSEA AND VOMITING: Primary | ICD-10-CM

## 2022-08-25 DIAGNOSIS — C71.9 GLIOBLASTOMA (H): Primary | ICD-10-CM

## 2022-08-25 DIAGNOSIS — T45.1X5A CHEMOTHERAPY-INDUCED NAUSEA AND VOMITING: Primary | ICD-10-CM

## 2022-08-25 RX ORDER — ONDANSETRON 4 MG/1
4 TABLET, FILM COATED ORAL AT BEDTIME
Qty: 30 TABLET | Refills: 3 | Status: SHIPPED | OUTPATIENT
Start: 2022-08-25 | End: 2022-09-02

## 2022-08-25 RX ORDER — TEMOZOLOMIDE 20 MG/1
20 CAPSULE ORAL DAILY
Qty: 42 CAPSULE | Refills: 0 | Status: SHIPPED | OUTPATIENT
Start: 2022-09-06 | End: 2022-08-25

## 2022-08-25 RX ORDER — TEMOZOLOMIDE 140 MG/1
140 CAPSULE ORAL DAILY
Qty: 42 CAPSULE | Refills: 0 | Status: SHIPPED | OUTPATIENT
Start: 2022-09-06 | End: 2022-08-25

## 2022-08-25 RX ORDER — PROCHLORPERAZINE MALEATE 10 MG
10 TABLET ORAL EVERY 6 HOURS PRN
Qty: 30 TABLET | Refills: 2 | Status: SHIPPED | OUTPATIENT
Start: 2022-09-06 | End: 2022-08-25

## 2022-08-25 NOTE — ORAL ONC MGMT
"Oral Chemotherapy Monitoring Program    Lab Monitoring Plan  CBC weekly during XRT, CMP monthly  Subjective/Objective:  Laz Stein is a 52 year old male contacted by phone for an initial visit for oral chemotherapy education.  Matt has decided to puruse XRT/Temodar as standard of care. Discussed with Dr Reuben reyes to use signed Temodar RX from Research plan and she will update treatment plan at later time.    ORAL CHEMOTHERAPY 8/22/2022 8/25/2022   Assessment Type Initial Work up New Teach   Diagnosis Code Brain Cancer - Glioblastoma Brain Cancer - Glioblastoma   Providers Dr. Chase Stone   Clinic Name/Location [facility]   Drug Name Temodar (temozolomide) Temodar (temozolomide)   Dose 160 mg 160 mg   Current Schedule Daily Daily   Cycle Details Continuous for 42 days during XRT Continuous for 42 days during XRT   Planned next cycle start date - 8/30/2022   Any new drug interactions? No No   Is the dose as ordered appropriate for the patient? Yes Yes       Last PHQ-2 Score on record: No flowsheet data found.    Vitals:  BP:   BP Readings from Last 1 Encounters:   08/22/22 129/77     Wt Readings from Last 1 Encounters:   08/22/22 85.6 kg (188 lb 12.8 oz)     Estimated body surface area is 2.09 meters squared as calculated from the following:    Height as of 8/12/22: 1.845 m (6' 0.64\").    Weight as of 8/22/22: 85.6 kg (188 lb 12.8 oz).    Labs:  _  Result Component Current Result Ref Range   Sodium 142 (8/22/2022) 133 - 144 mmol/L     Result Component Current Result Ref Range   Potassium 4.2 (8/22/2022) 3.4 - 5.3 mmol/L     Result Component Current Result Ref Range   Calcium 8.9 (8/22/2022) 8.5 - 10.1 mg/dL     Result Component Current Result Ref Range   Magnesium 2.4 (H) (8/22/2022) 1.6 - 2.3 mg/dL     Result Component Current Result Ref Range   Albumin 3.7 (8/22/2022) 3.4 - 5.0 g/dL     Result Component Current Result Ref Range   Urea Nitrogen 13 (8/22/2022) 7 - 30 mg/dL     Result Component Current " Result Ref Range   Creatinine 0.89 (8/22/2022) 0.66 - 1.25 mg/dL     Result Component Current Result Ref Range   AST 18 (8/22/2022) 0 - 45 U/L     Result Component Current Result Ref Range   ALT 21 (8/22/2022) 0 - 70 U/L     Result Component Current Result Ref Range   Bilirubin Total 0.6 (8/22/2022) 0.2 - 1.3 mg/dL     Result Component Current Result Ref Range   WBC Count 7.0 (8/22/2022) 4.0 - 11.0 10e3/uL     Result Component Current Result Ref Range   Hemoglobin 15.1 (8/22/2022) 13.3 - 17.7 g/dL     Result Component Current Result Ref Range   Platelet Count 271 (8/22/2022) 150 - 450 10e3/uL       Assessment:  Patient is appropriate to start therapy.    Plan:  Basic chemotherapy teaching was reviewed with the patient including indication, start date of therapy, dose, administration, adverse effects, missed doses, food and drug interactions, monitoring, side effect management, office contact information, and safe handling. Written materials were provided and all questions answered.    Follow-Up:  1 week following initial start of therapy    Nelida Soto PharmD  Oral Chemotherapy Monitoring Program  [facility]  [phone]  August 25, 2022

## 2022-08-26 RX ORDER — TEMOZOLOMIDE 140 MG/1
140 CAPSULE ORAL DAILY
Refills: 0 | COMMUNITY
Start: 2022-08-26 | End: 2022-10-18

## 2022-08-26 RX ORDER — TEMOZOLOMIDE 20 MG/1
20 CAPSULE ORAL AT BEDTIME
Qty: 42 CAPSULE | Refills: 0 | Status: SHIPPED | OUTPATIENT
Start: 2022-08-31 | End: 2022-09-02

## 2022-08-26 RX ORDER — TEMOZOLOMIDE 140 MG/1
140 CAPSULE ORAL AT BEDTIME
Qty: 42 CAPSULE | Refills: 0 | Status: SHIPPED | OUTPATIENT
Start: 2022-08-31 | End: 2022-09-02

## 2022-08-26 RX ORDER — TEMOZOLOMIDE 20 MG/1
20 CAPSULE ORAL DAILY
Qty: 42 CAPSULE | Refills: 0 | COMMUNITY
Start: 2022-08-26 | End: 2022-10-18

## 2022-08-30 ENCOUNTER — TELEPHONE (OUTPATIENT)
Dept: RADIATION ONCOLOGY | Facility: CLINIC | Age: 52
End: 2022-08-30

## 2022-08-30 NOTE — TELEPHONE ENCOUNTER
Patient has decided to do the study beginning September 6th.  Notified staff  And Dr Hernandez of patients decision. No further questions or concerns.

## 2022-09-02 ENCOUNTER — TELEPHONE (OUTPATIENT)
Dept: ONCOLOGY | Facility: CLINIC | Age: 52
End: 2022-09-02

## 2022-09-02 NOTE — TELEPHONE ENCOUNTER
Research RN spoke with Matt in regards to medications. Per the trial, he is to start Temodar on Day 1 as opposed to the night prior. He is aware of the start date for Temodar on Tuesday 9/6. Zofran is prohibited on trial, so compazine will be ordered. Writer made him aware that he will receive a different anti-nausea med.

## 2022-09-06 ENCOUNTER — APPOINTMENT (OUTPATIENT)
Dept: RADIATION ONCOLOGY | Facility: CLINIC | Age: 52
End: 2022-09-06
Attending: RADIOLOGY
Payer: COMMERCIAL

## 2022-09-06 ENCOUNTER — ONCOLOGY VISIT (OUTPATIENT)
Dept: ONCOLOGY | Facility: CLINIC | Age: 52
End: 2022-09-06
Attending: STUDENT IN AN ORGANIZED HEALTH CARE EDUCATION/TRAINING PROGRAM
Payer: COMMERCIAL

## 2022-09-06 ENCOUNTER — RESEARCH ENCOUNTER (OUTPATIENT)
Dept: ONCOLOGY | Facility: CLINIC | Age: 52
End: 2022-09-06

## 2022-09-06 ENCOUNTER — LAB (OUTPATIENT)
Dept: LAB | Facility: CLINIC | Age: 52
End: 2022-09-06
Attending: PSYCHIATRY & NEUROLOGY
Payer: COMMERCIAL

## 2022-09-06 VITALS
OXYGEN SATURATION: 95 % | WEIGHT: 191.7 LBS | SYSTOLIC BLOOD PRESSURE: 131 MMHG | TEMPERATURE: 97.8 F | DIASTOLIC BLOOD PRESSURE: 85 MMHG | RESPIRATION RATE: 16 BRPM | HEART RATE: 76 BPM | BODY MASS INDEX: 25.54 KG/M2

## 2022-09-06 DIAGNOSIS — C71.9 GLIOBLASTOMA (H): Primary | ICD-10-CM

## 2022-09-06 DIAGNOSIS — R45.89 DIFFICULTY COPING: ICD-10-CM

## 2022-09-06 DIAGNOSIS — Z00.6 EXAMINATION OF PARTICIPANT OR CONTROL IN CLINICAL RESEARCH: ICD-10-CM

## 2022-09-06 DIAGNOSIS — G40.909 SEIZURE DISORDER (H): ICD-10-CM

## 2022-09-06 LAB
ALBUMIN SERPL BCG-MCNC: 3.9 G/DL (ref 3.5–5.2)
ALP SERPL-CCNC: 67 U/L (ref 40–129)
ALT SERPL W P-5'-P-CCNC: 8 U/L (ref 10–50)
ANION GAP SERPL CALCULATED.3IONS-SCNC: 12 MMOL/L (ref 7–15)
AST SERPL W P-5'-P-CCNC: 20 U/L (ref 10–50)
ATRIAL RATE - MUSE: 67 BPM
BASOPHILS # BLD AUTO: 0.1 10E3/UL (ref 0–0.2)
BASOPHILS NFR BLD AUTO: 1 %
BILIRUB DIRECT SERPL-MCNC: <0.2 MG/DL (ref 0–0.3)
BILIRUB SERPL-MCNC: 0.2 MG/DL
BUN SERPL-MCNC: 16.3 MG/DL (ref 6–20)
CALCIUM SERPL-MCNC: 9.4 MG/DL (ref 8.6–10)
CHLORIDE SERPL-SCNC: 104 MMOL/L (ref 98–107)
CREAT SERPL-MCNC: 0.87 MG/DL (ref 0.67–1.17)
DEPRECATED HCO3 PLAS-SCNC: 22 MMOL/L (ref 22–29)
DIASTOLIC BLOOD PRESSURE - MUSE: NORMAL MMHG
EOSINOPHIL # BLD AUTO: 0.3 10E3/UL (ref 0–0.7)
EOSINOPHIL NFR BLD AUTO: 4 %
ERYTHROCYTE [DISTWIDTH] IN BLOOD BY AUTOMATED COUNT: 12.8 % (ref 10–15)
GFR SERPL CREATININE-BSD FRML MDRD: >90 ML/MIN/1.73M2
GLUCOSE SERPL-MCNC: 114 MG/DL (ref 70–99)
HCT VFR BLD AUTO: 44.2 % (ref 40–53)
HGB BLD-MCNC: 14.7 G/DL (ref 13.3–17.7)
IMM GRANULOCYTES # BLD: 0 10E3/UL
IMM GRANULOCYTES NFR BLD: 1 %
INTERPRETATION ECG - MUSE: NORMAL
LYMPHOCYTES # BLD AUTO: 1.2 10E3/UL (ref 0.8–5.3)
LYMPHOCYTES NFR BLD AUTO: 18 %
MAGNESIUM SERPL-MCNC: 2 MG/DL (ref 1.7–2.3)
MCH RBC QN AUTO: 29.8 PG (ref 26.5–33)
MCHC RBC AUTO-ENTMCNC: 33.3 G/DL (ref 31.5–36.5)
MCV RBC AUTO: 90 FL (ref 78–100)
MONOCYTES # BLD AUTO: 0.7 10E3/UL (ref 0–1.3)
MONOCYTES NFR BLD AUTO: 10 %
NEUTROPHILS # BLD AUTO: 4.6 10E3/UL (ref 1.6–8.3)
NEUTROPHILS NFR BLD AUTO: 66 %
NRBC # BLD AUTO: 0 10E3/UL
NRBC BLD AUTO-RTO: 0 /100
P AXIS - MUSE: 61 DEGREES
PLATELET # BLD AUTO: 259 10E3/UL (ref 150–450)
POTASSIUM SERPL-SCNC: 4.3 MMOL/L (ref 3.4–5.3)
PR INTERVAL - MUSE: 178 MS
PROT SERPL-MCNC: 6.5 G/DL (ref 6.4–8.3)
QRS DURATION - MUSE: 88 MS
QT - MUSE: 392 MS
QTC - MUSE: 414 MS
R AXIS - MUSE: 43 DEGREES
RBC # BLD AUTO: 4.94 10E6/UL (ref 4.4–5.9)
SODIUM SERPL-SCNC: 138 MMOL/L (ref 136–145)
SYSTOLIC BLOOD PRESSURE - MUSE: NORMAL MMHG
T AXIS - MUSE: 50 DEGREES
VENTRICULAR RATE- MUSE: 67 BPM
WBC # BLD AUTO: 6.8 10E3/UL (ref 4–11)

## 2022-09-06 PROCEDURE — 83735 ASSAY OF MAGNESIUM: CPT | Performed by: PSYCHIATRY & NEUROLOGY

## 2022-09-06 PROCEDURE — 77336 RADIATION PHYSICS CONSULT: CPT | Performed by: RADIOLOGY

## 2022-09-06 PROCEDURE — 80053 COMPREHEN METABOLIC PANEL: CPT | Performed by: PSYCHIATRY & NEUROLOGY

## 2022-09-06 PROCEDURE — 85004 AUTOMATED DIFF WBC COUNT: CPT | Performed by: PSYCHIATRY & NEUROLOGY

## 2022-09-06 PROCEDURE — 77386 HC IMRT TREATMENT DELIVERY, COMPLEX: CPT | Performed by: RADIOLOGY

## 2022-09-06 PROCEDURE — G0463 HOSPITAL OUTPT CLINIC VISIT: HCPCS

## 2022-09-06 PROCEDURE — 99417 PROLNG OP E/M EACH 15 MIN: CPT | Mod: Q1 | Performed by: STUDENT IN AN ORGANIZED HEALTH CARE EDUCATION/TRAINING PROGRAM

## 2022-09-06 PROCEDURE — 77014 PR CT GUIDE FOR PLACEMENT RADIATION THERAPY FIELDS: CPT | Mod: 26 | Performed by: RADIOLOGY

## 2022-09-06 PROCEDURE — 82248 BILIRUBIN DIRECT: CPT | Performed by: PSYCHIATRY & NEUROLOGY

## 2022-09-06 PROCEDURE — 36415 COLL VENOUS BLD VENIPUNCTURE: CPT | Performed by: PSYCHIATRY & NEUROLOGY

## 2022-09-06 PROCEDURE — 99215 OFFICE O/P EST HI 40 MIN: CPT | Mod: 24 | Performed by: STUDENT IN AN ORGANIZED HEALTH CARE EDUCATION/TRAINING PROGRAM

## 2022-09-06 PROCEDURE — 77427 RADIATION TX MANAGEMENT X5: CPT | Performed by: RADIOLOGY

## 2022-09-06 PROCEDURE — 93010 ELECTROCARDIOGRAM REPORT: CPT | Performed by: INTERNAL MEDICINE

## 2022-09-06 PROCEDURE — 99001 SPECIMEN HANDLING PT-LAB: CPT | Performed by: PSYCHIATRY & NEUROLOGY

## 2022-09-06 RX ORDER — PROCHLORPERAZINE MALEATE 10 MG
10 TABLET ORAL EVERY 6 HOURS PRN
Qty: 30 TABLET | Refills: 2 | Status: SHIPPED | OUTPATIENT
Start: 2022-09-06 | End: 2022-10-20

## 2022-09-06 ASSESSMENT — PAIN SCALES - GENERAL: PAINLEVEL: NO PAIN (0)

## 2022-09-06 NOTE — NURSING NOTE
[facility] CLINICAL TRIAL VISIT NOTE      Date of Visit: 9/6/2022     Study: A Randomized, Double-Blind, Placebo-Controlled Phase 3 Study of Enzastaurin Added to Temozolomide During and Following Radiation Therapy in Newly Diagnosed Glioblastoma Patients Who Possess the Novel Genomic Biomarker DGM1     [facility]#: [id number]   [facility]#: [id number]  IDS#: [id number]  Subject Name: Laz Stein  Subject ID: [id number]     Visit: Day 1 Visit     Did the study visit occur within the appropriate window allowed by the protocol? Yes     Clinical Trial Treatment History:  -7/2022 PRESENTATION: Conductive aphasia, seizure activity.  -7/19/2022 MR brain imaging demonstrated a heterogeneously enhancing mixed solid and cystic mass in the left parietal lobe. Mild local mass effect and no midline shift.   -7/20/2022 SURGERY: Craniotomy with 5ALA-guided resection by Dr. Reese.   PATHOLOGY: Glioblastoma; IDH1, IDH2 wildtype. MGMT promoter unmethylated.               BRAF, PTEN, TP53 not mutated.              This infiltrating glioma shows palisading tumor necrosis and microvascular proliferation.  Post-operative MR brain imaging demonstrated gross total resection.  -8/12/2022 NEURO-ONC: Recommending upfront chemoradiotherapy on or off a clinical trial.   -8/22/2022 NEURO-ONC/ CLINICAL TRIAL:Clinically with numbness in the right leg. Screening labs. Planning MRI tomorrow.   -9/6/22 NEURO-ONC/CHEMO-RAD/Starts [facility] clinical trial (temozolomide 160 mg daily and enzastaurin 250 mg daily vs placebo)    Vitals:   /85 (BP Location: Right arm, Patient Position: Sitting, Cuff Size: Adult Regular)   Pulse 76   Temp 97.8  F (36.6  C) (Oral)   Resp 16   Wt 87 kg (191 lb 11.2 oz)   SpO2 95%   BMI 25.54 kg/m       - Hypertension: Grade 1: Continue  - All other criteria grade 0/normal.     Baseline Weight: 85.6 kg  % Weight Change from Baseline: +2.0%  EKG completed.  QTcF Interval (Fridericia's): 405    Abnormal Assessment Findings Per Nelida  Scheierl, CNP and AE Grading:     Medical History:   Noting tingling/ numbness in the right leg, but sensation intact to light touch throughout.  - Intermittent paresthesia: bilateral lower extremity: Grade 1   Mild frontal headache the last few days   - Intermittent headache: Grade 1    - All other criteria grade 0/normal.     I have personally interviewed Laz Stein and reviewed his medical record for adverse events and these have been recorded on the corresponding logs in Laz LEWIS Sarita's research file.      CONCOMITANT MEDICATIONS:   I have personally reviewed concomitant medications and these have been recorded on the corresponding logs in Matt's research file.  See full list of medications below:     Current Outpatient Medications   Medication Sig Dispense Refill     levETIRAcetam (KEPPRA) 1000 MG tablet Take 1 tablet (1,000 mg) by mouth 2 times daily 60 tablet 11     sulfamethoxazole-trimethoprim (BACTRIM DS) 800-160 MG tablet Take 1 tablet by mouth Every Mon, Wed, Fri Morning To start with the start of radiation. 18 tablet 0     temozolomide (TEMODAR) 140 MG capsule Take 1 capsule (140 mg) by mouth daily for 42 doses with 1 other temozolomide prescription for 160 mg total Take at bedtime on an empty stomach  0     temozolomide (TEMODAR) 20 MG capsule Take 1 capsule (20 mg) by mouth daily for 42 doses with 1 other temozolomide prescription for 160 mg total Take at bedtime on an empty stomach 42 capsule 0         DRUG ALLERGIES:    No Known Allergies    LAB RESULTS:  Labs including CBC, CMP, Mag, Direct Bilirubin, urine for research, and research labs were drawn. Labs were reviewed- any significant lab values were addressed and reviewed.       - All criteria grade 0/normal or abnormal but not accompanied by clinical symptoms or leading to treatment changes, additional therapies, medical interventions, or additional testing or not scored per CTCAE criteria.     Component      Latest Ref Rng &  Units 9/6/2022   ALT      10 - 50 U/L 8 (L)   Glucose      70 - 99 mg/dL 114 (H)     Performance Status (KPS): 100    Percentage    100 Normal, no complaints, no evidence of disease   90 Able to carry on normal activity; minor signs or symptoms of disease   80 Normal activity with effort; some signs or symptoms of disease   70 Cares for self; unable to carry on normal activity or do active work   60 Requires occasional assistance, but is able to care for most of his/her needs   50 Requires considerable assistance and frequent medical care   40 Disabled; requires special care and assistance   30 Severely disabled, hospitalization indicated. Death not imminent   20 Very sick, hospitalization necessary, active supportive treatment necessary   10 Moribund, fatal processes, progressing rapidly   0 Dead     Overall Treatment Plan for Clinical Trial:   This is a randomized controlled Phase 3 trial evaluating the effect of enzastaurin in patients with glioblastoma, where the primary endpoint is OS. Patients with newly diagnosed glioblastoma who meet all eligibility criteria will be enrolled. Randomization will occur within approximately 6 weeks after resection of the glioblastoma. Study treatment (Day 1) is recommended to begin on the same day or no later than 3 days after randomization. The 3 phases of the study are the same as in the run-in part except for patients receiving placebo in place of enzastaurin. Patients will be randomized 1:1 to Arm A: RT plus temozolomide and enzastaurin for 6 weeks, ending with the last dose of RT and Day 42 of temozolomide and enzastaurin therapy (Concurrent Phase), followed by enzastaurin alone for 21 to 35 days (Single-Agent Phase), then temozolomide for 6 cycles (or up to 12 cycles according to SOC) and enzastaurin up to a total of 24 cycles (1 cycle = 28 days) (Adjuvant Phase); or Arm B: RT plus temozolomide and placebo for 6 weeks, ending with Day 42 of temozolomide and placebo  therapy (Concurrent Phase) followed by placebo alone for 21 to 35 days (Single-Agent Phase), then temozolomide for 6 cycles (or up to 12 cycles according to SOC) and placebo up to a total of 24 cycles (1 cycle = 28 days) (Adjuvant Phase).     Since temozolomide should be administered on an empty stomach or at least 2 hours after a meal and enzastaurin/placebo should be administered within 30 minutes after a meal, patients should take temozolomide on an empty stomach, after an appropriate wait (recommend ~60 minutes) eat a meal, and then take the enzastaurin/placebo dose. Alternatively, patients may take temozolomide at bedtime and enzastaurin/placebo in the morning with or within 30 minutes after breakfast.    Medication Count/IDS Note:  Drug Name: Enzastaurin/placebo  IDS#: [id number]    Number of bottles dispensed: 1  Lot number(s): -27  Number of pills dispensed: 128    There is no drug diary for this study.     Research RN Impression:   Matt is feeling well. Over a busy weekend he had a headache and fatigue. He says that at times he's felt mildly lightheaded. He was instructed how to take the compazine, temozolomide, and enzastaurin/placebo. He will take the enzastaurin/placebo with lunch today after radiation and start taking it with breakfast from then on tomorrow. He asked about the flu shot. Writer will check with the sponsor and get back to him.     Laz Stein was given the opportunity to ask any trial related questions. The patient will be back for daily radiation the rest of the week, labs next week, and a provider visit on Week 3. The patient knows to call with any new or worsening symptoms or concerns.      Please see provider progress note for full physical exam and other clinical information.      Mikayla Murcia, Clinical Research Coordinator, RN.  [facility], [facility]   Clinical Trials Office  Solid Tumor Unit

## 2022-09-06 NOTE — NURSING NOTE
Chief Complaint   Patient presents with     Blood Draw     Labs drawn via  by RN. Vitals taken.     Labs (including research labs) drawn with  by RN. Vitals taken. Patient checked into next appointment.    Linda Marie RN

## 2022-09-06 NOTE — PATIENT INSTRUCTIONS
For constipation:  Docusate 100 mg; 1 cap orally 3 times a day (stool softener for constipation) and Senna 8.6 m tabs orally at bedtime (laxative as needed for constipation).    Temozolomide:  Take anti-nausea medication (compazine) 30 to 60 minutes before  Take temozolomide 1 hour before food or 2 hours after

## 2022-09-06 NOTE — NURSING NOTE
"Oncology Rooming Note    September 6, 2022 7:05 AM   Laz Stein is a 52 year old male who presents for:    Chief Complaint   Patient presents with     Blood Draw     Labs drawn via  by RN. Vitals taken.     Oncology Clinic Visit     Rtn for glioblastoma     Initial Vitals: /85 (BP Location: Right arm, Patient Position: Sitting, Cuff Size: Adult Regular)   Pulse 76   Temp 97.8  F (36.6  C) (Oral)   Resp 16   Wt 87 kg (191 lb 11.2 oz)   SpO2 95%   BMI 25.54 kg/m   Estimated body mass index is 25.54 kg/m  as calculated from the following:    Height as of 8/12/22: 1.845 m (6' 0.64\").    Weight as of this encounter: 87 kg (191 lb 11.2 oz). Body surface area is 2.11 meters squared.  No Pain (0) Comment: Data Unavailable   No LMP for male patient.  Allergies reviewed: Yes  Medications reviewed: Yes    Medications: Medication refills not needed today.  Pharmacy name entered into [facility]: [facility] - [place], [place] - [address]    Clinical concerns: Patient has no specific questions or clinical concerns outside of the reason for the visit.     Elza Welch, EMT            "

## 2022-09-06 NOTE — LETTER
"    9/6/2022         RE: Laz Stein  [address]  [place] [address]        Dear Colleague,    Thank you for referring your patient, Laz Stein, to the [facility]. Please see a copy of my visit note below.    NEURO-ONCOLOGY VISIT  Sep 6, 2022    CHIEF COMPLAINT: Mr. 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 The plan is to initiate [facility]. He has signed consent for the Randomized, Double-Blind, Placebo-Controlled Phase 3 Study of Enzastaurin Added to Temozolomide During and Following Radiation Therapy in Newly Diagnosed Glioblastoma Patients Who Possess the Novel Genomic Biomarker DGM1.    I met with Matt and his wife today for evaluation prior to starting treatment with clinical trial.    HISTORY OF PRESENT ILLNESS  -Matt is doing well today. 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 He endorses some fatigue due to the busy weekend. He is hoping to get some rest today.  -Numbness tingling in right leg from knee to foot is somewhat less. Mostly just some tingling in the foot now.  -Occasional word finding difficulty, worse with fatigue. Not significantly changed per patient.  -Mood with occasional anxiety related to diagnosis and treatment.  -No activity concerning for seizures. On Keppra, does occasionally miss a dose.  -Mild frontal headache the last few days which he attributes to being busy/stressed.  -Balance is ok but sometimes feels subjectively a bit off balance, but he thinks this may be secondary to anxiety/fixating on thinking about his balance. His wife has noticed no changes in his balance.  -Denies any focal weakness or vision changes.      MEDICATIONS   Current Outpatient Medications   Medication " Sig Dispense Refill     levETIRAcetam (KEPPRA) 1000 MG tablet Take 1 tablet (1,000 mg) by mouth 2 times daily 60 tablet 11     prochlorperazine (COMPAZINE) 10 MG tablet Take 1 tablet (10 mg) by mouth every 6 hours as needed (Nausea/Vomiting) 30 tablet 2     study - enzastaurin vs placebo, IDS# [id number], 125 mg TABS tablet Take 2 tablets (250 mg) by mouth daily for 42 doses Within 30 minutes of completing a meal, preferably approximately the same time each day. The tablets must be swallowed whole, no crushing or breaking allowed. 84 tablet 0     sulfamethoxazole-trimethoprim (BACTRIM DS) 800-160 MG tablet Take 1 tablet by mouth Every Mon, Wed, Fri Morning To start with the start of radiation. 18 tablet 0     temozolomide (TEMODAR) 140 MG capsule Take 1 capsule (140 mg) by mouth daily for 42 doses with 1 other temozolomide prescription for 160 mg total Take at bedtime on an empty stomach  0     temozolomide (TEMODAR) 20 MG capsule Take 1 capsule (20 mg) by mouth daily for 42 doses with 1 other temozolomide prescription for 160 mg total Take at bedtime on an empty stomach 42 capsule 0     DRUG ALLERGIES No Known Allergies      ONCOLOGIC HISTORY  -7/2022 PRESENTATION: Conductive aphasia, seizure activity.  -7/19/2022 MR brain imaging demonstrated a heterogeneously enhancing mixed solid and cystic mass in the left parietal lobe. Mild local mass effect and no midline shift.   -7/20/2022 SURGERY: Craniotomy with 5ALA-guided resection by Dr. Reese.   PATHOLOGY: Glioblastoma; IDH1, IDH2 wildtype. MGMT promoter unmethylated.    BRAF, PTEN, TP53 not mutated.   This infiltrating glioma shows palisading tumor necrosis and microvascular proliferation.  Post-operative MR brain imaging demonstrated gross total resection.  -8/12/2022 NEURO-ONC: Recommending upfront chemoradiotherapy on or off a clinical trial.   -8/22/2022 NEURO-ONC/ CLINICAL TRIAL: Clinically with numbness in the right leg. Screening labs. Planning MRI tomorrow.  "  -9/6/22 NEURO-ONC/CHEMO-RAD/Starts [facility] clinical trial (temozolomide 160 mg daily and enzastaurin 250 mg daily vs placebo)    PHYSICAL EXAMINATION  /85 (BP Location: Right arm, Patient Position: Sitting, Cuff Size: Adult Regular)   Pulse 76   Temp 97.8  F (36.6  C) (Oral)   Resp 16   Wt 87 kg (191 lb 11.2 oz)   SpO2 95%   BMI 25.54 kg/m     Wt Readings from Last 2 Encounters:   09/06/22 87 kg (191 lb 11.2 oz)   08/22/22 85.6 kg (188 lb 12.8 oz)      Ht Readings from Last 2 Encounters:   08/12/22 1.845 m (6' 0.64\")   07/19/22 1.829 m (6')     KPS: 100    General: Well-appearing male in no acute distress.  Eyes: EOMI, PERRL. No scleral icterus.  ENT: Oral mucosa is moist without lesions or thrush.   Cardiovascular: RRR No murmurs, gallops, or rubs. No peripheral edema.  Respiratory: CTA bilaterally. No wheezes or crackles.  Gastrointestinal: BS +. Abdomen soft, non-tender. No palpable hepatosplenomegaly or masses.   Skin: No rashes, petechiae, or bruising noted on exposed skin..  -Neurologic:   MENTAL STATUS:     Alert, oriented to date.   Speech fluent.    Comprehension intact to multi-step commands.   Good right-left orientation.     CRANIAL NERVES:     Pupils are equal, round.    Extraocular movements full, denies diplopia.     Visual fields full.     Symmetric facial movements.   Hearing intact.   No dysarthria.  MOTOR:    No pronation or drift.    Able to rise from a chair without use of arms.   On toe/ heel walk, equal distance from floor to heels/ toes.   SENSATION: Noting tingling/ numbness in the right leg, but sensation intact to light touch throughout.  COORDINATION: Intact finger-nose with eyes open.  GAIT:  Walks without assistance.   Good speed. Normal stride length and heel strike. Normal turns. Normal arm swing.   Able to toe, heel walk. Able to tandem walk.       MEDICAL RECORDS  Not applicable    LABS  Personally reviewed all available lab results; CBC, CMP, bilirubin, magnesium. EKG " also reviewed.    IMAGING  No new neuro-imaging to review.        IMPRESSION     Matt is clinically doing well with no new neurological complaints. Just noting some fatigue after a busy weekend. He does note that he occasionally will miss a dose of his Keppra. We discussed the importance of compliance to prevent seizure and that fatigue/being tired also lowers the threshold for seizures. He verbalized understanding. He is clinically appropriate and labs and EKG acceptable to proceed with Day 1 of the clinical triall:  A Randomized, Double-Blind, Placebo-Controlled Phase 3 Study of Enzastaurin Added to Temozolomide During and Following Radiation Therapy in Newly Diagnosed Glioblastoma Patients Who Possess the Novel Genomic Biomarker DGM1. We again reviewed possible side effects such as fatigue, nausea and constipation as well as preventative measures. We also discussed the need for Bactrim during treatment.     PROBLEM LIST  Glioblastoma  Aphasia    PLAN  -CANCER-DIRECTED THERAPY-  -As above; start temozolomide (160 mg daily) as part of clinical trial today  -Supportive medications;        -Anti-nausea: Compazine (Zofran is not allowed on the clinical trial).       -For lymphopenia, prophylactic antibiotics are recommended during concurrent treatment: Sulfamethoxazole-trimethoprim (Bactrim) 800 mg-160 mg; 1 tab orally Monday, Wednesday, and Friday for pneumocystis prophylaxis.       -Bowel regimen: Docusate 100 mg; 1 cap orally 3 times a day (stool softener for constipation) and Senna 8.6 m tabs orally at bedtime (laxative as needed for constipation).    -STEROIDS-  -Currently off dexamethasone.    -SEIZURE MANAGEMENT-  -No concern for seizure activity.   -Given history of seizures, will continue current antiepileptic regimen of Keppra for the foreseeable future.  -On Keppra. Recommended using alarm to ensure compliance and prioritizing sleep to prevent seizure.    -Quality of life/ MOOD/ FATIGUE-  -Continues to  have intermittent anxiety attacks. Has appointment with Dr. Lane tomorrow which will be helpful in forming strategies for coping.   -Continue to monitor mood as untreated/ undertreated depression can worsen fatigue, dysorexia, and quality of life.    -Headache-  -Mild. Likely s/t to fatigue. Encouraged rest and hydration. Monitor for worsening.     -Health maintenance-  -Patient inquiring about influenza vaccination timing. Research RN will follow up with Dr. Stone and research team to determine appropriate timing.     Return to clinic in approximately 3 weeks per clinical trial requirements.     Amber Scheierl, CNP    75 minutes spent on the date of the encounter doing chart review, review of test results, interpretation of tests, patient visit and documentation         Again, thank you for allowing me to participate in the care of your patient.      Sincerely,    Amber J. Scheierl, APRN CNP

## 2022-09-06 NOTE — PROGRESS NOTES
"NEURO-ONCOLOGY VISIT  Sep 6, 2022    CHIEF COMPLAINT: Mr. 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 The plan is to initiate upfront chemoradiotherapy on [facility] clinical trial. He has signed consent for the Randomized, Double-Blind, Placebo-Controlled Phase 3 Study of Enzastaurin Added to Temozolomide During and Following Radiation Therapy in Newly Diagnosed Glioblastoma Patients Who Possess the Novel Genomic Biomarker DGM1.    I met with Matt and his wife today for evaluation prior to starting treatment with clinical trial.    HISTORY OF PRESENT ILLNESS  -Matt is doing well today. 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 He endorses some fatigue due to the busy weekend. He is hoping to get some rest today.  -Numbness tingling in right leg from knee to foot is somewhat less. Mostly just some tingling in the foot now.  -Occasional word finding difficulty, worse with fatigue. Not significantly changed per patient.  -Mood with occasional anxiety related to diagnosis and treatment.  -No activity concerning for seizures. On Keppra, does occasionally miss a dose.  -Mild frontal headache the last few days which he attributes to being busy/stressed.  -Balance is ok but sometimes feels subjectively a bit off balance, but he thinks this may be secondary to anxiety/fixating on thinking about his balance. His wife has noticed no changes in his balance.  -Denies any focal weakness or vision changes.      MEDICATIONS   Current Outpatient Medications   Medication Sig Dispense Refill     levETIRAcetam (KEPPRA) 1000 MG tablet Take 1 tablet (1,000 mg) by mouth 2 times daily 60 tablet 11     prochlorperazine (COMPAZINE) 10 MG tablet Take 1 tablet (10 mg) by mouth every 6 hours as needed (Nausea/Vomiting) 30 tablet 2     " study - enzastaurin vs placebo, IDS# [id number], 125 mg TABS tablet Take 2 tablets (250 mg) by mouth daily for 42 doses Within 30 minutes of completing a meal, preferably approximately the same time each day. The tablets must be swallowed whole, no crushing or breaking allowed. 84 tablet 0     sulfamethoxazole-trimethoprim (BACTRIM DS) 800-160 MG tablet Take 1 tablet by mouth Every Mon, Wed, Fri Morning To start with the start of radiation. 18 tablet 0     temozolomide (TEMODAR) 140 MG capsule Take 1 capsule (140 mg) by mouth daily for 42 doses with 1 other temozolomide prescription for 160 mg total Take at bedtime on an empty stomach  0     temozolomide (TEMODAR) 20 MG capsule Take 1 capsule (20 mg) by mouth daily for 42 doses with 1 other temozolomide prescription for 160 mg total Take at bedtime on an empty stomach 42 capsule 0     DRUG ALLERGIES No Known Allergies      ONCOLOGIC HISTORY  -7/2022 PRESENTATION: Conductive aphasia, seizure activity.  -7/19/2022 MR brain imaging demonstrated a heterogeneously enhancing mixed solid and cystic mass in the left parietal lobe. Mild local mass effect and no midline shift.   -7/20/2022 SURGERY: Craniotomy with 5ALA-guided resection by Dr. Reese.   PATHOLOGY: Glioblastoma; IDH1, IDH2 wildtype. MGMT promoter unmethylated.    BRAF, PTEN, TP53 not mutated.   This infiltrating glioma shows palisading tumor necrosis and microvascular proliferation.  Post-operative MR brain imaging demonstrated gross total resection.  -8/12/2022 NEURO-ONC: Recommending upfront chemoradiotherapy on or off a clinical trial.   -8/22/2022 NEURO-ONC/ CLINICAL TRIAL: Clinically with numbness in the right leg. Screening labs. Planning MRI tomorrow.   -9/6/22 NEURO-ONC/CHEMO-RAD/Starts DENOVO clinical trial (temozolomide 160 mg daily and enzastaurin 250 mg daily vs placebo)    PHYSICAL EXAMINATION  /85 (BP Location: Right arm, Patient Position: Sitting, Cuff Size: Adult Regular)   Pulse 76   Temp 97.8  " F (36.6  C) (Oral)   Resp 16   Wt 87 kg (191 lb 11.2 oz)   SpO2 95%   BMI 25.54 kg/m     Wt Readings from Last 2 Encounters:   09/06/22 87 kg (191 lb 11.2 oz)   08/22/22 85.6 kg (188 lb 12.8 oz)      Ht Readings from Last 2 Encounters:   08/12/22 1.845 m (6' 0.64\")   07/19/22 1.829 m (6')     KPS: 100    General: Well-appearing male in no acute distress.  Eyes: EOMI, PERRL. No scleral icterus.  ENT: Oral mucosa is moist without lesions or thrush.   Cardiovascular: RRR No murmurs, gallops, or rubs. No peripheral edema.  Respiratory: CTA bilaterally. No wheezes or crackles.  Gastrointestinal: BS +. Abdomen soft, non-tender. No palpable hepatosplenomegaly or masses.   Skin: No rashes, petechiae, or bruising noted on exposed skin..  -Neurologic:   MENTAL STATUS:     Alert, oriented to date.   Speech fluent.    Comprehension intact to multi-step commands.   Good right-left orientation.     CRANIAL NERVES:     Pupils are equal, round.    Extraocular movements full, denies diplopia.     Visual fields full.     Symmetric facial movements.   Hearing intact.   No dysarthria.  MOTOR:    No pronation or drift.    Able to rise from a chair without use of arms.   On toe/ heel walk, equal distance from floor to heels/ toes.   SENSATION: Noting tingling/ numbness in the right leg, but sensation intact to light touch throughout.  COORDINATION: Intact finger-nose with eyes open.  GAIT:  Walks without assistance.   Good speed. Normal stride length and heel strike. Normal turns. Normal arm swing.   Able to toe, heel walk. Able to tandem walk.       MEDICAL RECORDS  Not applicable    LABS  Personally reviewed all available lab results; CBC, CMP, bilirubin, magnesium. EKG also reviewed.    IMAGING  No new neuro-imaging to review.        IMPRESSION     Matt is clinically doing well with no new neurological complaints. Just noting some fatigue after a busy weekend. He does note that he occasionally will miss a dose of his Keppra. We " discussed the importance of compliance to prevent seizure and that fatigue/being tired also lowers the threshold for seizures. He verbalized understanding. He is clinically appropriate and labs and EKG acceptable to proceed with Day 1 of the clinical triall:  A Randomized, Double-Blind, Placebo-Controlled Phase 3 Study of Enzastaurin Added to Temozolomide During and Following Radiation Therapy in Newly Diagnosed Glioblastoma Patients Who Possess the Novel Genomic Biomarker DGM1. We again reviewed possible side effects such as fatigue, nausea and constipation as well as preventative measures. We also discussed the need for Bactrim during treatment.     PROBLEM LIST  Glioblastoma  Aphasia    PLAN  -CANCER-DIRECTED THERAPY-  -As above; start temozolomide (160 mg daily) as part of clinical trial today  -Supportive medications;        -Anti-nausea: Compazine (Zofran is not allowed on the clinical trial).       -For lymphopenia, prophylactic antibiotics are recommended during concurrent treatment: Sulfamethoxazole-trimethoprim (Bactrim) 800 mg-160 mg; 1 tab orally Monday, Wednesday, and Friday for pneumocystis prophylaxis.       -Bowel regimen: Docusate 100 mg; 1 cap orally 3 times a day (stool softener for constipation) and Senna 8.6 m tabs orally at bedtime (laxative as needed for constipation).    -STEROIDS-  -Currently off dexamethasone.    -SEIZURE MANAGEMENT-  -No concern for seizure activity.   -Given history of seizures, will continue current antiepileptic regimen of Keppra for the foreseeable future.  -On Keppra. Recommended using alarm to ensure compliance and prioritizing sleep to prevent seizure.    -Quality of life/ MOOD/ FATIGUE-  -Continues to have intermittent anxiety attacks. Has appointment with Dr. Lane tomorrow which will be helpful in forming strategies for coping.   -Continue to monitor mood as untreated/ undertreated depression can worsen fatigue, dysorexia, and quality of  life.    -Headache-  -Mild. Likely s/t to fatigue. Encouraged rest and hydration. Monitor for worsening.     -Health maintenance-  -Patient inquiring about influenza vaccination timing. Research RN will follow up with Dr. Stone and research team to determine appropriate timing.     Return to clinic in approximately 3 weeks per clinical trial requirements.     Amber Scheierl, CNP    75 minutes spent on the date of the encounter doing chart review, review of test results, interpretation of tests, patient visit and documentation

## 2022-09-07 ENCOUNTER — APPOINTMENT (OUTPATIENT)
Dept: RADIATION ONCOLOGY | Facility: CLINIC | Age: 52
End: 2022-09-07
Attending: RADIOLOGY
Payer: COMMERCIAL

## 2022-09-07 ENCOUNTER — VIRTUAL VISIT (OUTPATIENT)
Dept: ONCOLOGY | Facility: CLINIC | Age: 52
End: 2022-09-07
Attending: PSYCHIATRY & NEUROLOGY
Payer: COMMERCIAL

## 2022-09-07 DIAGNOSIS — R45.89 DIFFICULTY COPING: ICD-10-CM

## 2022-09-07 DIAGNOSIS — C71.9 GLIOBLASTOMA (H): ICD-10-CM

## 2022-09-07 PROCEDURE — 77014 PR CT GUIDE FOR PLACEMENT RADIATION THERAPY FIELDS: CPT | Mod: 26 | Performed by: RADIOLOGY

## 2022-09-07 PROCEDURE — 77386 HC IMRT TREATMENT DELIVERY, COMPLEX: CPT | Performed by: RADIOLOGY

## 2022-09-07 PROCEDURE — 90832 PSYTX W PT 30 MINUTES: CPT | Mod: 95 | Performed by: PSYCHOLOGIST

## 2022-09-07 NOTE — PROGRESS NOTES
RADIATION ONCOLOGY WEEKLY ON TREATMENT VISIT   Encounter Date: 2022    Patient Name: Laz Stein  MRN: [id number]  : 1970     Disease and Stage: Left parietal glioblastoma, IDH wild-type, MGMT promoter unmethylated  Treatment Site: Partial brain  Current Dose/Planned Total Dose: 600 / 6000 cGy  Daily Fraction Size: 200 cGy/day, 5 times/week  Concurrent Chemotherapy: Yes  Drug and Frequency: Temozolomide    Treatment Summary:    22: Initiation of radiotherapy    22: Weekly RT visit. Current dose of 600 cGy. Tolerating treatment well.     ED visits/Hospitalizations:  None    Missed Treatments:  None    Subjective: Mr. Stein presents to clinic today for his weekly on-treatment visit. He has tolerated the initiation of radiotherapy well and has no pressing concerns or complaints on examination.    ROS:   Constitutional  Pain (0-10): 0  Fatigue: None    Nutrition  Diet: Regular    CNS  Headaches: Mild    Cardiopulmonary  Dyspnea: None  Chest pain: None     GI  Nausea/vomiting: Mild nausea without vomiting    Integumentary  Dermatitis: None    Objective:   Weight: 87.3 kg  BP: 123/76  Pulse: 59    General: 52-year-old gentleman seated comfortably in a chair in no acute distress  HEENT: NC/AT. Craniotomy incision is intact. No alopecia.  Pulmonary: No wheezing, stridor or respiratory distress  Skin: Normal color and turgor  Neuro: A/O x3. Normal gait    Treatment-related toxicities (CTCAE v5.0):  None    Assessment:    Mr. Stein is a 52 year old male with an IDH wild-type, MGMT promoter unmethylated left parietal glioblastoma status post gross total resection. He is receiving adjuvant chemoradiotherapy +/- Enzastaurin per -52.    Plan:   IDH wild-type, MGMT promoter unmethylated left parietal glioblastoma:    Continue chemoradiotherapy    Pain management:    No symptoms requiring medical management    Fluids/Electrolytes/Nutrition:    Continue diet as  tolerated    Dermatitis:    Recommended consideration of daily/twice daily moisturizer applied to the skin within the treatment portal    Mosaiq chart and setup information reviewed  kVCT images reviewed    Medication list reviewed    Carmelo Hernandez MD/PhD    Dept of Radiation Oncology  [facility]

## 2022-09-07 NOTE — PROGRESS NOTES
[facility] Oncology- Psychotherapy                                    Progress Note    Patient Name: Laz Stein  Date: 22         Service Type: Individual      Session Start Time: 3:00  Session End Time: 3:30     Session Length: 30 minutes    Session #: 1    Attendees: Client and Spouse / Significant Other    Service Modality:  Video Visit:      Provider verified identity through the following two step process.  Patient provided:  Patient     Telemedicine Visit: The patient's condition can be safely assessed and treated via synchronous audio and visual telemedicine encounter.      Reason for Telemedicine Visit: Services only offered telehealth    Originating Site (Patient Location[place] [facility] [place] [facility] [place]Provider Location): Provider Remote Setting- Home Office    Consent:  The patient/guardian has verbally consented to: the potential risks and benefits of telemedicine (video visit) versus in person care; bill my insurance or make self-payment for services provided; and responsibility for payment of non-covered services.     Patient would like the video invitation sent by:  My Chart    Mode of Communication:  Video Conference via [employer]well    As the provider I attest to compliance with applicable laws and regulations related to telemedicine.    DATA  Interactive Complexity: No  Crisis: No     Current / Ongoing Stressors and Concerns:   Brain tumor, impact on family, anxiousness     Treatment Objective(s) Addressed in This Session:   identify coping strategies to more effectively address stressors     Intervention:   Motivational Interviewing: to help him increase use of distraction     ASSESSMENT: Current Emotional / Mental Status (status of significant symptoms):   Risk status (Self / Other harm or suicidal ideation)   Patient denies current fears or concerns for personal safety.   Patient denies current or recent suicidal ideation or behaviors.   Patient denies  current or recent homicidal ideation or behaviors.   Patient denies current or recent self injurious behavior or ideation.   Patient denies other safety concerns.   Patient reports there has been no change in risk factors since their last session.     Patient reports there has been no change in protective factors since their last session.     Recommended that patient call 911 or go to the local ED should there be a change in any of these risk factors.     Appearance:   Appropriate    Eye Contact:   Good    Psychomotor Behavior: Normal    Attitude:   Cooperative    Orientation:   All   Speech    Rate / Production: Normal     Volume:  Normal    Mood:    Normal   Affect:    Appropriate    Thought Content:  Clear    Thought Form:  Coherent  Logical    Insight:    Good      Medication Review:   No changes to current psychiatric medication(s)     Medication Compliance:   Yes     Changes in Health Issues:   None reported     Chemical Use Review:   Substance Use: Chemical use reviewed, no active concerns identified      Tobacco Use: No current tobacco use.      Diagnosis:  1. Glioblastoma (H)    2. Difficulty coping        Collateral Reports Completed:   Not Applicable    PLAN: (Patient Tasks / Therapist Tasks / Other)  Helped him and his wife understand the usage of distraction to reduce cancer ruminations and thus anxiousness.     Matthew Lane, Conor                                                       ______________________________________________________________________    Individual Treatment Plan    Patient's Name: Laz Stein  YOB: 1970    Date of Creation: 9/7/22  Date Treatment Plan Last Reviewed/Revised:     DSM5 Diagnoses: 300.09 (F41.8) Other Specified Anxiety Disorder   Psychosocial / Contextual Factors: brain tumor, stress  Anticipated number of session for this episode of care: 3-6 sessions  Anticipation frequency of session: Every other week  Anticipated Duration of each session:  16-37 minutes  Treatment plan will be reviewed in 90 days or when goals have been changed.       MeasurableTreatment Goal(s) related to diagnosis / functional impairment(s)  Goal 1: Patient will increase activity and use of distraction    I will know I've met my goal when I increase activity.      Patient has reviewed and agreed to the above plan.      Matthew Lane PsyD  September 7, 2022

## 2022-09-08 ENCOUNTER — APPOINTMENT (OUTPATIENT)
Dept: RADIATION ONCOLOGY | Facility: CLINIC | Age: 52
End: 2022-09-08
Attending: RADIOLOGY
Payer: COMMERCIAL

## 2022-09-08 VITALS
SYSTOLIC BLOOD PRESSURE: 123 MMHG | BODY MASS INDEX: 25.65 KG/M2 | HEART RATE: 59 BPM | WEIGHT: 192.5 LBS | DIASTOLIC BLOOD PRESSURE: 76 MMHG

## 2022-09-08 DIAGNOSIS — C71.9 GLIOBLASTOMA (H): Primary | ICD-10-CM

## 2022-09-08 PROCEDURE — 77386 HC IMRT TREATMENT DELIVERY, COMPLEX: CPT | Performed by: RADIOLOGY

## 2022-09-09 ENCOUNTER — APPOINTMENT (OUTPATIENT)
Dept: RADIATION ONCOLOGY | Facility: CLINIC | Age: 52
End: 2022-09-09
Attending: RADIOLOGY
Payer: COMMERCIAL

## 2022-09-09 PROCEDURE — 77386 HC IMRT TREATMENT DELIVERY, COMPLEX: CPT | Performed by: RADIOLOGY

## 2022-09-09 PROCEDURE — 77014 PR CT GUIDE FOR PLACEMENT RADIATION THERAPY FIELDS: CPT | Mod: 26 | Performed by: RADIOLOGY

## 2022-09-12 ENCOUNTER — RESEARCH ENCOUNTER (OUTPATIENT)
Dept: ONCOLOGY | Facility: CLINIC | Age: 52
End: 2022-09-12
Payer: COMMERCIAL

## 2022-09-12 ENCOUNTER — APPOINTMENT (OUTPATIENT)
Dept: RADIATION ONCOLOGY | Facility: CLINIC | Age: 52
End: 2022-09-12
Attending: RADIOLOGY
Payer: COMMERCIAL

## 2022-09-12 DIAGNOSIS — C71.9 GLIOBLASTOMA (H): Primary | ICD-10-CM

## 2022-09-12 PROCEDURE — 77386 HC IMRT TREATMENT DELIVERY, COMPLEX: CPT | Performed by: RADIOLOGY

## 2022-09-12 PROCEDURE — 77014 PR CT GUIDE FOR PLACEMENT RADIATION THERAPY FIELDS: CPT | Mod: 26 | Performed by: RADIOLOGY

## 2022-09-13 ENCOUNTER — LAB (OUTPATIENT)
Dept: LAB | Facility: CLINIC | Age: 52
End: 2022-09-13
Attending: PSYCHIATRY & NEUROLOGY
Payer: COMMERCIAL

## 2022-09-13 ENCOUNTER — TELEPHONE (OUTPATIENT)
Dept: ONCOLOGY | Facility: CLINIC | Age: 52
End: 2022-09-13

## 2022-09-13 ENCOUNTER — RESEARCH ENCOUNTER (OUTPATIENT)
Dept: ONCOLOGY | Facility: CLINIC | Age: 52
End: 2022-09-13

## 2022-09-13 ENCOUNTER — APPOINTMENT (OUTPATIENT)
Dept: RADIATION ONCOLOGY | Facility: CLINIC | Age: 52
End: 2022-09-13
Attending: RADIOLOGY
Payer: COMMERCIAL

## 2022-09-13 DIAGNOSIS — C71.9 GLIOBLASTOMA (H): ICD-10-CM

## 2022-09-13 LAB
ALBUMIN SERPL BCG-MCNC: 3.9 G/DL (ref 3.5–5.2)
ALP SERPL-CCNC: 72 U/L (ref 40–129)
ALT SERPL W P-5'-P-CCNC: 14 U/L (ref 10–50)
ANION GAP SERPL CALCULATED.3IONS-SCNC: 12 MMOL/L (ref 7–15)
AST SERPL W P-5'-P-CCNC: 21 U/L (ref 10–50)
BASOPHILS # BLD AUTO: 0.1 10E3/UL (ref 0–0.2)
BASOPHILS NFR BLD AUTO: 1 %
BILIRUB SERPL-MCNC: 0.6 MG/DL
BUN SERPL-MCNC: 12.4 MG/DL (ref 6–20)
CALCIUM SERPL-MCNC: 9 MG/DL (ref 8.6–10)
CHLORIDE SERPL-SCNC: 103 MMOL/L (ref 98–107)
CREAT SERPL-MCNC: 1.03 MG/DL (ref 0.67–1.17)
DEPRECATED HCO3 PLAS-SCNC: 22 MMOL/L (ref 22–29)
EOSINOPHIL # BLD AUTO: 0.3 10E3/UL (ref 0–0.7)
EOSINOPHIL NFR BLD AUTO: 4 %
ERYTHROCYTE [DISTWIDTH] IN BLOOD BY AUTOMATED COUNT: 12.8 % (ref 10–15)
GFR SERPL CREATININE-BSD FRML MDRD: 86 ML/MIN/1.73M2
GLUCOSE SERPL-MCNC: 102 MG/DL (ref 70–99)
HCT VFR BLD AUTO: 44.8 % (ref 40–53)
HGB BLD-MCNC: 15 G/DL (ref 13.3–17.7)
IMM GRANULOCYTES # BLD: 0 10E3/UL
IMM GRANULOCYTES NFR BLD: 0 %
LYMPHOCYTES # BLD AUTO: 1.4 10E3/UL (ref 0.8–5.3)
LYMPHOCYTES NFR BLD AUTO: 20 %
MCH RBC QN AUTO: 29.5 PG (ref 26.5–33)
MCHC RBC AUTO-ENTMCNC: 33.5 G/DL (ref 31.5–36.5)
MCV RBC AUTO: 88 FL (ref 78–100)
MONOCYTES # BLD AUTO: 0.8 10E3/UL (ref 0–1.3)
MONOCYTES NFR BLD AUTO: 11 %
NEUTROPHILS # BLD AUTO: 4.5 10E3/UL (ref 1.6–8.3)
NEUTROPHILS NFR BLD AUTO: 64 %
NRBC # BLD AUTO: 0 10E3/UL
NRBC BLD AUTO-RTO: 0 /100
PLATELET # BLD AUTO: 271 10E3/UL (ref 150–450)
POTASSIUM SERPL-SCNC: 4.2 MMOL/L (ref 3.4–5.3)
PROT SERPL-MCNC: 6.6 G/DL (ref 6.4–8.3)
RBC # BLD AUTO: 5.09 10E6/UL (ref 4.4–5.9)
SODIUM SERPL-SCNC: 137 MMOL/L (ref 136–145)
WBC # BLD AUTO: 7.1 10E3/UL (ref 4–11)

## 2022-09-13 PROCEDURE — 99001 SPECIMEN HANDLING PT-LAB: CPT | Performed by: PSYCHIATRY & NEUROLOGY

## 2022-09-13 PROCEDURE — 77336 RADIATION PHYSICS CONSULT: CPT | Performed by: RADIOLOGY

## 2022-09-13 PROCEDURE — 77014 PR CT GUIDE FOR PLACEMENT RADIATION THERAPY FIELDS: CPT | Mod: 26 | Performed by: RADIOLOGY

## 2022-09-13 PROCEDURE — 36415 COLL VENOUS BLD VENIPUNCTURE: CPT | Performed by: PSYCHIATRY & NEUROLOGY

## 2022-09-13 PROCEDURE — 77386 HC IMRT TREATMENT DELIVERY, COMPLEX: CPT | Performed by: RADIOLOGY

## 2022-09-13 PROCEDURE — 80053 COMPREHEN METABOLIC PANEL: CPT

## 2022-09-13 PROCEDURE — 85025 COMPLETE CBC W/AUTO DIFF WBC: CPT | Performed by: PSYCHIATRY & NEUROLOGY

## 2022-09-13 NOTE — NURSING NOTE
Chief Complaint   Patient presents with     Labs Only     Labs drawn via  by RN in lab       Labs collected from venipuncture by RN.     Mary Gomez RN

## 2022-09-13 NOTE — ORAL ONC MGMT
Oral Chemotherapy Monitoring Program    Subjective/Objective:  Laz Stein is a 52 year old male contacted by phone for a follow-up visit for oral chemotherapy. Matt returned our call for an initial assessment, following up 1 week since he started temozolomide on 9/6/22. He confirms taking temozolomide 160 mg daily at night on an empty stomach. He denies any missed doses and no new medications. Matt tells me he did have nausea with vomiting the first night that he took his temozolomide, but has had no additional episodes since. He confirms taking prochlorperazine ~30 min prior to his temozolomide, as well as setting an alarm for 6 hours later overnight to take an additional dose. Since he has had no further episodes, I did suggest trying to skip his dose overnight to see if you can get good sleep instead. He will try this and see if there are any further episodes, if there are he will continue with what has been working for him. He hasn't noticed any significant constipation since starting, but he did share that he hasn't been quite regular since his surgery in July, because anesthesia really affects him this way. He had a bowel movement yesterday. He has not abdominal pain or discomfort and is passing gas. He knows to give us a call if this gets worse or he is experiencing pain, he does have some OTC bowel medications at home he plans to take if he does have issues as well. Otherwise only minor headaches which have been manageable. Matt did have a question re: his research medication and whether or not he needs to continue to hold this the morning of his lab appts. He would like me to reach out to research RN Mikayla to clarify.    ORAL CHEMOTHERAPY 8/22/2022 8/25/2022 9/13/2022   Assessment Type Initial Work up New Teach Initial Follow up;Lab Monitoring   Diagnosis Code Brain Cancer - Glioblastoma Brain Cancer - Glioblastoma Brain Cancer - Glioblastoma   Providers Dr. Chase Stone   [facility]  [facility]   Drug Name Temodar (temozolomide) Temodar (temozolomide) Temodar (temozolomide)   Dose 160 mg 160 mg 160 mg   Current Schedule Daily Daily Daily   Cycle Details Continuous for 42 days during XRT Continuous for 42 days during XRT Continuous for 42 days during XRT   Start Date of Last Cycle - - 9/6/2022   Planned next cycle start date - 8/30/2022 -   Doses missed in last 2 weeks - - 0   Adherence Assessment - - Adherent   Adverse Effects - - Nausea;Constipation   Nausea - - Grade 1   Pharmacist Intervention(nausea) - - No   Constipation - - Grade 1   Pharmacist Intervention(constipation) - - No   Any new drug interactions? No No No   Is the dose as ordered appropriate for the patient? Yes Yes Yes       Last PHQ-2 Score on record: No flowsheet data found.    Vitals:  BP:   BP Readings from Last 1 Encounters:   09/08/22 123/76     Wt Readings from Last 1 Encounters:   09/08/22 87.3 kg (192 lb 8 oz)     Estimated body surface area is 2.12 meters squared as calculated from the following:    Height as of 8/12/22: 1.845 m (6' 0.64\").    Weight as of 9/8/22: 87.3 kg (192 lb 8 oz).    Labs:  _  Result Component Current Result Ref Range   Sodium 137 (9/13/2022) 136 - 145 mmol/L     _  Result Component Current Result Ref Range   Potassium 4.2 (9/13/2022) 3.4 - 5.3 mmol/L     _  Result Component Current Result Ref Range   Calcium 9.0 (9/13/2022) 8.6 - 10.0 mg/dL     _  Result Component Current Result Ref Range   Magnesium 2.0 (9/6/2022) 1.7 - 2.3 mg/dL     No results found for Phos within last 30 days.     _  Result Component Current Result Ref Range   Albumin 3.9 (9/13/2022) 3.5 - 5.2 g/dL     _  Result Component Current Result Ref Range   Urea Nitrogen 12.4 (9/13/2022) 6.0 - 20.0 mg/dL     _  Result Component Current Result Ref Range   Creatinine 1.03 (9/13/2022) 0.67 - 1.17 mg/dL     _  Result Component Current Result Ref Range   AST 21 (9/13/2022) 10 - 50 U/L     _  Result Component " Current Result Ref Range   ALT 14 (9/13/2022) 10 - 50 U/L     _  Result Component Current Result Ref Range   Bilirubin Total 0.6 (9/13/2022) <=1.2 mg/dL     _  Result Component Current Result Ref Range   WBC Count 7.1 (9/13/2022) 4.0 - 11.0 10e3/uL     _  Result Component Current Result Ref Range   Hemoglobin 15.0 (9/13/2022) 13.3 - 17.7 g/dL     _  Result Component Current Result Ref Range   Platelet Count 271 (9/13/2022) 150 - 450 10e3/uL     No results found for ANC within last 30 days.     _  Result Component Current Result Ref Range   Absolute Neutrophils 4.5 (9/13/2022) 1.6 - 8.3 10e3/uL      Assessment/Plan:  Matt is tolerating temozolomide thus far, continue as directed.     Follow-Up:  9/21 lab appt (with f/u with Mikayla via IB on whether Matt needs to hold his study drug morning of his labs)    Refill Due:  10/11/22    Ramon Feliciano, PharmD, BCPS, BCOP  Hematology/Oncology Clinical Pharmacist  Oral Chemotherapy Monitoring Program  [facility]  [phone]

## 2022-09-13 NOTE — ORAL ONC MGMT
Oral Chemotherapy Monitoring Program     Placed call to patient in follow up of Temodar therapy.      No answer. Voicemail box was full and was unable to leave a message. Will send a [email or web address] message as well.    Jefe Perea PharmD  [facility]  [phone]  September 13, 2022

## 2022-09-14 ENCOUNTER — APPOINTMENT (OUTPATIENT)
Dept: RADIATION ONCOLOGY | Facility: CLINIC | Age: 52
End: 2022-09-14
Attending: RADIOLOGY
Payer: COMMERCIAL

## 2022-09-14 PROCEDURE — 77014 PR CT GUIDE FOR PLACEMENT RADIATION THERAPY FIELDS: CPT | Mod: 26 | Performed by: RADIOLOGY

## 2022-09-14 PROCEDURE — 77386 HC IMRT TREATMENT DELIVERY, COMPLEX: CPT | Performed by: RADIOLOGY

## 2022-09-14 NOTE — PROGRESS NOTES
RADIATION ONCOLOGY WEEKLY ON TREATMENT VISIT   Encounter Date: September 15, 2022    Patient Name: Laz Stein  MRN: [id number]  : 1970     Disease and Stage: Left parietal glioblastoma, IDH wild-type, MGMT promoter unmethylated  Treatment Site: Partial brain  Current Dose/Planned Total Dose: 1600 / 6000 cGy  Daily Fraction Size: 200 cGy/day, 5 times/week  Concurrent Chemotherapy: Yes  Drug and Frequency: Temozolomide    Treatment Summary:    22: Initiation of radiotherapy    22: Weekly RT visit. Current dose of 600 cGy. Tolerating treatment well.     09/15/22: Weekly RT visit. Current dose of 1600 cGy. Tolerating treatment well.     ED visits/Hospitalizations:  None    Missed Treatments:  None    Subjective: Mr. Stein presents to clinic today for his weekly on-treatment visit. Overall, he continues to tolerate her therapy well and has no pressing concerns or complaints. He does describe some difficulty waking up in the middle of the night otherwise his ROS are positive for stable, ongoing mild fatigue.    ROS:   Constitutional  Pain (0-10): 0  Fatigue: Mild    Nutrition  Diet: Regular    CNS  Headaches: None    Cardiopulmonary  Dyspnea: None  Chest pain: None    GI  Nausea/vomiting: None    Integumentary  Dermatitis: None    Objective:   Weight: 88.3 kg  BP: 156/77  Pulse: 76    General: 52-year-old gentleman seated comfortably in a chair in no acute distress  HEENT: NC/AT. Mild erythema around the previous craniotomy site. No alopecia.  Pulmonary: No wheezing, stridor or respiratory distress  Skin: Normal color and turgor  Neuro: A/O x3. Normal gait.    Treatment-related toxicities (CTCAE v5.0):  None    Assessment:    Mr. Stein is a 52 year old male with an IDH wild-type, MGMT promoter unmethylated left parietal glioblastoma status post gross total resection. He is receiving adjuvant chemoradiotherapy +/- Enzastaurin per -48. He is tolerating treatment well with no  significant acute radiation-induced toxicities.    Plan:   IDH wild-type, MGMT promoter unmethylated left parietal glioblastoma:    Continue chemoradiotherapy    Pain management:    No symptoms requiring medical management    Fluids/Electrolytes/Nutrition:    Continue diet as tolerated    Dermatitis:    As needed moisturizer application to the scalp    Mosaiq chart and setup information reviewed  kVCT images reviewed    Medication list reviewed    Carmelo Hernandez MD/PhD    Dept of Radiation Oncology  [facility]

## 2022-09-15 ENCOUNTER — OFFICE VISIT (OUTPATIENT)
Dept: RADIATION ONCOLOGY | Facility: CLINIC | Age: 52
End: 2022-09-15
Attending: RADIOLOGY
Payer: COMMERCIAL

## 2022-09-15 VITALS
DIASTOLIC BLOOD PRESSURE: 77 MMHG | OXYGEN SATURATION: 96 % | HEART RATE: 76 BPM | RESPIRATION RATE: 16 BRPM | BODY MASS INDEX: 25.93 KG/M2 | SYSTOLIC BLOOD PRESSURE: 156 MMHG | WEIGHT: 194.6 LBS

## 2022-09-15 DIAGNOSIS — C71.9 GLIOBLASTOMA (H): Primary | ICD-10-CM

## 2022-09-15 PROCEDURE — 77386 HC IMRT TREATMENT DELIVERY, COMPLEX: CPT | Performed by: RADIOLOGY

## 2022-09-15 ASSESSMENT — PAIN SCALES - GENERAL: PAINLEVEL: NO PAIN (0)

## 2022-09-16 ENCOUNTER — APPOINTMENT (OUTPATIENT)
Dept: RADIATION ONCOLOGY | Facility: CLINIC | Age: 52
End: 2022-09-16
Attending: RADIOLOGY
Payer: COMMERCIAL

## 2022-09-16 PROCEDURE — 77386 HC IMRT TREATMENT DELIVERY, COMPLEX: CPT | Performed by: RADIOLOGY

## 2022-09-16 PROCEDURE — 77014 PR CT GUIDE FOR PLACEMENT RADIATION THERAPY FIELDS: CPT | Mod: 26 | Performed by: RADIOLOGY

## 2022-09-16 NOTE — NURSING NOTE
[facility] CLINICAL TRIAL VISIT NOTE      Date of Visit: 9/13/2022     Study: A Randomized, Double-Blind, Placebo-Controlled Phase 3 Study of Enzastaurin Added to Temozolomide During and Following Radiation Therapy in Newly Diagnosed Glioblastoma Patients Who Possess the Novel Genomic Biomarker DGM1     [facility]#: 2020   [facility]#: [id number]  IDS#: [id number]  Subject Name: Laz Stein  Subject ID: [id number]     Visit: Week 2 Day 8     Did the study visit occur within the appropriate window allowed by the protocol? Yes     Clinical Trial Treatment History:  -7/2022 PRESENTATION: Conductive aphasia, seizure activity.  -7/19/2022 MR brain imaging demonstrated a heterogeneously enhancing mixed solid and cystic mass in the left parietal lobe. Mild local mass effect and no midline shift.   -7/20/2022 SURGERY: Craniotomy with 5ALA-guided resection by Dr. Reese.   PATHOLOGY: Glioblastoma; IDH1, IDH2 wildtype. MGMT promoter unmethylated.               BRAF, PTEN, TP53 not mutated.              This infiltrating glioma shows palisading tumor necrosis and microvascular proliferation.  Post-operative MR brain imaging demonstrated gross total resection.  -8/12/2022 NEURO-ONC: Recommending upfront chemoradiotherapy on or off a clinical trial.   -8/22/2022 NEURO-ONC/ CLINICAL TRIAL:Clinically with numbness in the right leg. Screening labs. Planning MRI tomorrow.   -9/6/22 NEURO-ONC/CHEMO-RAD/Starts DENOVO clinical trial (temozolomide 160 mg daily and enzastaurin 250 mg daily vs placebo)  -9/13/22 NEURO-ONC/CHEMO-RAD: Lab visit only.     Vitals:   There were no vitals taken for this visit.      CONCOMITANT MEDICATIONS:   I have personally reviewed concomitant medications and these have been recorded on the corresponding logs in Matt's research file.  See full list of medications below:     Current Outpatient Medications   Medication Sig Dispense Refill     levETIRAcetam (KEPPRA) 1000 MG tablet Take 1 tablet (1,000 mg) by mouth 2 times  daily 60 tablet 11     prochlorperazine (COMPAZINE) 10 MG tablet Take 1 tablet (10 mg) by mouth every 6 hours as needed (Nausea/Vomiting) 30 tablet 2     study - enzastaurin vs placebo, IDS# [id number], 125 mg TABS tablet Take 2 tablets (250 mg) by mouth daily for 42 doses Within 30 minutes of completing a meal, preferably approximately the same time each day. The tablets must be swallowed whole, no crushing or breaking allowed. 84 tablet 0     sulfamethoxazole-trimethoprim (BACTRIM DS) 800-160 MG tablet Take 1 tablet by mouth Every Mon, Wed, Fri Morning To start with the start of radiation. 18 tablet 0     temozolomide (TEMODAR) 140 MG capsule Take 1 capsule (140 mg) by mouth daily for 42 doses with 1 other temozolomide prescription for 160 mg total Take at bedtime on an empty stomach  0     temozolomide (TEMODAR) 20 MG capsule Take 1 capsule (20 mg) by mouth daily for 42 doses with 1 other temozolomide prescription for 160 mg total Take at bedtime on an empty stomach 42 capsule 0         DRUG ALLERGIES:    No Known Allergies    LAB RESULTS:  Labs including CBC and research labs were drawn. Labs were reviewed- any significant lab values were addressed and reviewed.       - All criteria grade 0/normal or abnormal but not accompanied by clinical symptoms or leading to treatment changes, additional therapies, medical interventions, or additional testing or not scored per CTCAE criteria.     Component      Latest Ref Rng & Units 9/13/2022   Glucose      70 - 99 mg/dL 102 (H)     Overall Treatment Plan for Clinical Trial:   This is a randomized controlled Phase 3 trial evaluating the effect of enzastaurin in patients with glioblastoma, where the primary endpoint is OS. Patients with newly diagnosed glioblastoma who meet all eligibility criteria will be enrolled. Randomization will occur within approximately 6 weeks after resection of the glioblastoma. Study treatment (Day 1) is recommended to begin on the same day or no later  than 3 days after randomization. The 3 phases of the study are the same as in the run-in part except for patients receiving placebo in place of enzastaurin. Patients will be randomized 1:1 to Arm A: RT plus temozolomide and enzastaurin for 6 weeks, ending with the last dose of RT and Day 42 of temozolomide and enzastaurin therapy (Concurrent Phase), followed by enzastaurin alone for 21 to 35 days (Single-Agent Phase), then temozolomide for 6 cycles (or up to 12 cycles according to SOC) and enzastaurin up to a total of 24 cycles (1 cycle = 28 days) (Adjuvant Phase); or Arm B: RT plus temozolomide and placebo for 6 weeks, ending with Day 42 of temozolomide and placebo therapy (Concurrent Phase) followed by placebo alone for 21 to 35 days (Single-Agent Phase), then temozolomide for 6 cycles (or up to 12 cycles according to SOC) and placebo up to a total of 24 cycles (1 cycle = 28 days) (Adjuvant Phase).     Since temozolomide should be administered on an empty stomach or at least 2 hours after a meal and enzastaurin/placebo should be administered within 30 minutes after a meal, patients should take temozolomide on an empty stomach, after an appropriate wait (recommend ~60 minutes) eat a meal, and then take the enzastaurin/placebo dose. Alternatively, patients may take temozolomide at bedtime and enzastaurin/placebo in the morning with or within 30 minutes after breakfast.    Research RN Impression:   Matt had research labs drawn pre-dose and took his enzastaurin/placebo at 12:30 today. Tomorrow he will resume taking it with breakfast again.     Laz Stein was given the opportunity to ask any trial related questions. The patient will be back for daily radiation and a provider visit next week. The patient knows to call with any new or worsening symptoms or concerns.      Please see provider progress note for full physical exam and other clinical information.     Mikayla Murcia, Clinical Research Coordinator,  RN.  [facility] [place], [facility]   Clinical Trials Office  Solid Tumor Unit

## 2022-09-19 ENCOUNTER — APPOINTMENT (OUTPATIENT)
Dept: RADIATION ONCOLOGY | Facility: CLINIC | Age: 52
End: 2022-09-19
Attending: RADIOLOGY
Payer: COMMERCIAL

## 2022-09-19 PROCEDURE — 77014 PR CT GUIDE FOR PLACEMENT RADIATION THERAPY FIELDS: CPT | Mod: 26 | Performed by: RADIOLOGY

## 2022-09-19 PROCEDURE — 77386 HC IMRT TREATMENT DELIVERY, COMPLEX: CPT | Performed by: RADIOLOGY

## 2022-09-19 PROCEDURE — 77427 RADIATION TX MANAGEMENT X5: CPT | Performed by: RADIOLOGY

## 2022-09-20 ENCOUNTER — APPOINTMENT (OUTPATIENT)
Dept: RADIATION ONCOLOGY | Facility: CLINIC | Age: 52
End: 2022-09-20
Attending: RADIOLOGY
Payer: COMMERCIAL

## 2022-09-20 PROCEDURE — 77336 RADIATION PHYSICS CONSULT: CPT | Performed by: RADIOLOGY

## 2022-09-20 PROCEDURE — 77014 PR CT GUIDE FOR PLACEMENT RADIATION THERAPY FIELDS: CPT | Mod: 26 | Performed by: RADIOLOGY

## 2022-09-20 PROCEDURE — 77386 HC IMRT TREATMENT DELIVERY, COMPLEX: CPT | Performed by: RADIOLOGY

## 2022-09-21 ENCOUNTER — OFFICE VISIT (OUTPATIENT)
Dept: RADIATION ONCOLOGY | Facility: CLINIC | Age: 52
End: 2022-09-21
Attending: RADIOLOGY
Payer: COMMERCIAL

## 2022-09-21 ENCOUNTER — LAB (OUTPATIENT)
Dept: LAB | Facility: CLINIC | Age: 52
End: 2022-09-21
Attending: PSYCHIATRY & NEUROLOGY
Payer: COMMERCIAL

## 2022-09-21 ENCOUNTER — ONCOLOGY VISIT (OUTPATIENT)
Dept: ONCOLOGY | Facility: CLINIC | Age: 52
End: 2022-09-21
Attending: PSYCHIATRY & NEUROLOGY
Payer: COMMERCIAL

## 2022-09-21 ENCOUNTER — RESEARCH ENCOUNTER (OUTPATIENT)
Dept: ONCOLOGY | Facility: CLINIC | Age: 52
End: 2022-09-21
Payer: COMMERCIAL

## 2022-09-21 VITALS
HEART RATE: 75 BPM | SYSTOLIC BLOOD PRESSURE: 133 MMHG | DIASTOLIC BLOOD PRESSURE: 83 MMHG | RESPIRATION RATE: 16 BRPM | OXYGEN SATURATION: 95 % | WEIGHT: 194.1 LBS | TEMPERATURE: 98.2 F | BODY MASS INDEX: 25.86 KG/M2

## 2022-09-21 VITALS
WEIGHT: 194 LBS | SYSTOLIC BLOOD PRESSURE: 143 MMHG | BODY MASS INDEX: 25.85 KG/M2 | HEART RATE: 74 BPM | DIASTOLIC BLOOD PRESSURE: 78 MMHG

## 2022-09-21 DIAGNOSIS — C71.9 GLIOBLASTOMA (H): Primary | ICD-10-CM

## 2022-09-21 LAB
ALBUMIN SERPL BCG-MCNC: 4.2 G/DL (ref 3.5–5.2)
ALP SERPL-CCNC: 71 U/L (ref 40–129)
ALT SERPL W P-5'-P-CCNC: 17 U/L (ref 10–50)
ANION GAP SERPL CALCULATED.3IONS-SCNC: 11 MMOL/L (ref 7–15)
AST SERPL W P-5'-P-CCNC: 24 U/L (ref 10–50)
BASOPHILS # BLD AUTO: 0.1 10E3/UL (ref 0–0.2)
BASOPHILS NFR BLD AUTO: 1 %
BILIRUB DIRECT SERPL-MCNC: <0.2 MG/DL (ref 0–0.3)
BILIRUB SERPL-MCNC: 0.3 MG/DL
BUN SERPL-MCNC: 13.4 MG/DL (ref 6–20)
CALCIUM SERPL-MCNC: 9.6 MG/DL (ref 8.6–10)
CHLORIDE SERPL-SCNC: 105 MMOL/L (ref 98–107)
CREAT SERPL-MCNC: 0.91 MG/DL (ref 0.67–1.17)
DEPRECATED HCO3 PLAS-SCNC: 24 MMOL/L (ref 22–29)
EOSINOPHIL # BLD AUTO: 0.2 10E3/UL (ref 0–0.7)
EOSINOPHIL NFR BLD AUTO: 3 %
ERYTHROCYTE [DISTWIDTH] IN BLOOD BY AUTOMATED COUNT: 12.8 % (ref 10–15)
GFR SERPL CREATININE-BSD FRML MDRD: >90 ML/MIN/1.73M2
GLUCOSE SERPL-MCNC: 96 MG/DL (ref 70–99)
HCG INTACT+B SERPL-ACNC: <1 MIU/ML
HCT VFR BLD AUTO: 45.8 % (ref 40–53)
HGB BLD-MCNC: 15.2 G/DL (ref 13.3–17.7)
IMM GRANULOCYTES # BLD: 0.1 10E3/UL
IMM GRANULOCYTES NFR BLD: 1 %
LYMPHOCYTES # BLD AUTO: 1.1 10E3/UL (ref 0.8–5.3)
LYMPHOCYTES NFR BLD AUTO: 18 %
MAGNESIUM SERPL-MCNC: 2.2 MG/DL (ref 1.7–2.3)
MCH RBC QN AUTO: 29.2 PG (ref 26.5–33)
MCHC RBC AUTO-ENTMCNC: 33.2 G/DL (ref 31.5–36.5)
MCV RBC AUTO: 88 FL (ref 78–100)
MONOCYTES # BLD AUTO: 0.8 10E3/UL (ref 0–1.3)
MONOCYTES NFR BLD AUTO: 13 %
NEUTROPHILS # BLD AUTO: 4.1 10E3/UL (ref 1.6–8.3)
NEUTROPHILS NFR BLD AUTO: 64 %
NRBC # BLD AUTO: 0 10E3/UL
NRBC BLD AUTO-RTO: 0 /100
PLATELET # BLD AUTO: 261 10E3/UL (ref 150–450)
POTASSIUM SERPL-SCNC: 4.3 MMOL/L (ref 3.4–5.3)
PROT SERPL-MCNC: 6.9 G/DL (ref 6.4–8.3)
RBC # BLD AUTO: 5.21 10E6/UL (ref 4.4–5.9)
SODIUM SERPL-SCNC: 140 MMOL/L (ref 136–145)
WBC # BLD AUTO: 6.3 10E3/UL (ref 4–11)

## 2022-09-21 PROCEDURE — 82248 BILIRUBIN DIRECT: CPT | Performed by: PSYCHIATRY & NEUROLOGY

## 2022-09-21 PROCEDURE — 36415 COLL VENOUS BLD VENIPUNCTURE: CPT | Performed by: PSYCHIATRY & NEUROLOGY

## 2022-09-21 PROCEDURE — 77386 HC IMRT TREATMENT DELIVERY, COMPLEX: CPT | Performed by: RADIOLOGY

## 2022-09-21 PROCEDURE — 80053 COMPREHEN METABOLIC PANEL: CPT | Performed by: PSYCHIATRY & NEUROLOGY

## 2022-09-21 PROCEDURE — 99215 OFFICE O/P EST HI 40 MIN: CPT | Mod: 24 | Performed by: STUDENT IN AN ORGANIZED HEALTH CARE EDUCATION/TRAINING PROGRAM

## 2022-09-21 PROCEDURE — G0463 HOSPITAL OUTPT CLINIC VISIT: HCPCS

## 2022-09-21 PROCEDURE — 84702 CHORIONIC GONADOTROPIN TEST: CPT | Performed by: PSYCHIATRY & NEUROLOGY

## 2022-09-21 PROCEDURE — 93010 ELECTROCARDIOGRAM REPORT: CPT | Performed by: INTERNAL MEDICINE

## 2022-09-21 PROCEDURE — 99417 PROLNG OP E/M EACH 15 MIN: CPT | Mod: 24 | Performed by: STUDENT IN AN ORGANIZED HEALTH CARE EDUCATION/TRAINING PROGRAM

## 2022-09-21 PROCEDURE — 83735 ASSAY OF MAGNESIUM: CPT | Performed by: PSYCHIATRY & NEUROLOGY

## 2022-09-21 PROCEDURE — 85025 COMPLETE CBC W/AUTO DIFF WBC: CPT | Performed by: PSYCHIATRY & NEUROLOGY

## 2022-09-21 ASSESSMENT — PAIN SCALES - GENERAL: PAINLEVEL: NO PAIN (0)

## 2022-09-21 NOTE — PROGRESS NOTES
"NEURO-ONCOLOGY VISIT  Sep 21, 2022    CHIEF COMPLAINT: Mr. 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 Started upfront chemoradiotherapy on Denovo clinical trial on 9/6/22 (Randomized, Double-Blind, Placebo-Controlled Phase 3 Study of Enzastaurin Added to Temozolomide During and Following Radiation Therapy in Newly Diagnosed Glioblastoma Patients Who Possess the Novel Genomic Biomarker DGM1).    Matt presents today today for routine follow up as part of continued monitoring while on treatment with clinical trial.    HISTORY OF PRESENT ILLNESS  -Matt is doing very well today. He reports that he has been tolerating the treatment well with minimal, if any, side effects. He had some mild nausea the first night of treatment but otherwise has not had nausea. Takes the compazine before temozolomide each evening. Appetite is good. Bowels regular with use of softener daily.  -Good energy levels, actually feels a bit more energetic, fidgety. Does a lot of walking for activity.  -Tingling in right foot is better.  -Word finding maybe somewhat better, still feels like he'll occasionally struggle to choose the right word but not noticeable to those around him.  -Some scalp irritation he suspects from radiation. Improved with use of Eucerin.  -Occasional lightheadedness but this was not necessarily unusual before treatment. No dizziness, no chest pain, no shortness of breath.   -On Keppra, no activity concerning for seizures.   -Saw Dr. Lane, felt this was beneficial.  -No headaches, no confusion, no vision changes, no focal weakness.   -Had eye exam 1 to 2 weeks ago. No concerns other than age-related changes per patient.    Review of Systems:  Patient denies any of the following except if noted above: fevers, chills, difficulty with energy, vision or hearing changes, chest pain, " dyspnea, abdominal pain, nausea, vomiting, diarrhea, constipation, urinary concerns, headaches, numbness, tingling, issues with sleep or mood. He also denies lumps, bumps, rashes or skin lesions, bleeding or bruising issues.    MEDICATIONS   Current Outpatient Medications   Medication Sig Dispense Refill     levETIRAcetam (KEPPRA) 1000 MG tablet Take 1 tablet (1,000 mg) by mouth 2 times daily 60 tablet 11     prochlorperazine (COMPAZINE) 10 MG tablet Take 1 tablet (10 mg) by mouth every 6 hours as needed (Nausea/Vomiting) 30 tablet 2     study - enzastaurin vs placebo, IDS# [id number], 125 mg TABS tablet Take 2 tablets (250 mg) by mouth daily for 42 doses Within 30 minutes of completing a meal, preferably approximately the same time each day. The tablets must be swallowed whole, no crushing or breaking allowed. 84 tablet 0     sulfamethoxazole-trimethoprim (BACTRIM DS) 800-160 MG tablet Take 1 tablet by mouth Every Mon, Wed, Fri Morning To start with the start of radiation. 18 tablet 0     temozolomide (TEMODAR) 140 MG capsule Take 1 capsule (140 mg) by mouth daily for 42 doses with 1 other temozolomide prescription for 160 mg total Take at bedtime on an empty stomach  0     temozolomide (TEMODAR) 20 MG capsule Take 1 capsule (20 mg) by mouth daily for 42 doses with 1 other temozolomide prescription for 160 mg total Take at bedtime on an empty stomach 42 capsule 0     DRUG ALLERGIES No Known Allergies      ONCOLOGIC HISTORY  -7/2022 PRESENTATION: Conductive aphasia, seizure activity.  -7/19/2022 MR brain imaging demonstrated a heterogeneously enhancing mixed solid and cystic mass in the left parietal lobe. Mild local mass effect and no midline shift.   -7/20/2022 SURGERY: Craniotomy with 5ALA-guided resection by Dr. Reese.   PATHOLOGY: Glioblastoma; IDH1, IDH2 wildtype. MGMT promoter unmethylated.    BRAF, PTEN, TP53 not mutated.   This infiltrating glioma shows palisading tumor necrosis and microvascular  "proliferation.  Post-operative MR brain imaging demonstrated gross total resection.  -8/12/2022 NEURO-ONC: Recommending upfront chemoradiotherapy on or off a clinical trial.   -8/22/2022 NEURO-ONC/ CLINICAL TRIAL: Clinically with numbness in the right leg. Screening labs. Planning MRI tomorrow.   -9/6/22 NEURO-ONC/CHEMO-RAD/Starts DENOVO clinical trial (temozolomide 160 mg daily and enzastaurin 250 mg daily vs placebo)    PHYSICAL EXAMINATION  /83 (BP Location: Left arm, Patient Position: Sitting, Cuff Size: Adult Regular)   Pulse 75   Temp 98.2  F (36.8  C) (Oral)   Resp 16   Wt 88 kg (194 lb 1.6 oz)   SpO2 95%   BMI 25.86 kg/m     Wt Readings from Last 2 Encounters:   09/21/22 88 kg (194 lb)   09/21/22 88 kg (194 lb 1.6 oz)      Ht Readings from Last 2 Encounters:   08/12/22 1.845 m (6' 0.64\")   07/19/22 1.829 m (6')     KPS: 100    General: Well-appearing male in no acute distress.  Eyes: EOMI, PERRL. No scleral icterus.  ENT: Oral mucosa is moist without lesions or thrush.   Cardiovascular: RRR No murmurs, gallops, or rubs.   Respiratory: CTA bilaterally. No wheezes or crackles.  Gastrointestinal: BS +. Abdomen soft, non-tender. No palpable hepatosplenomegaly or masses.   Skin: No rashes, petechiae, or bruising noted on exposed skin.    -Neurologic:   MENTAL STATUS:     Alert, oriented to date.   Speech fluent.    Comprehension intact to multi-step commands.   Good right-left orientation.     CRANIAL NERVES:     Pupils are equal, round.    Extraocular movements full, denies diplopia.     Visual fields full.     Symmetric facial movements.   Hearing intact.   No dysarthria.  MOTOR:    No pronation or drift.    Able to rise from a chair without use of arms.   On toe/ heel walk, equal distance from floor to heels/ toes.   SENSATION: Sensation intact to light touch throughout.  COORDINATION: Intact finger-nose with eyes open.  GAIT:  Walks without assistance.   Good speed. Normal stride length and heel " strike. Normal turns. Normal arm swing.   Able to toe, heel walk. Able to tandem walk.       MEDICAL RECORDS  Not applicable    LABS  Personally reviewed all available lab results; CBC, CMP, bilirubin direct, magnesium. Preliminary EKG also reviewed    IMAGING  No new imaging.    BETY Gutierrez is doing well today. He is pleasantly surprised at how well he is tolerating his treatment. He has not noticed any notable side effects from treatment. Maybe just a bit more restless/fidgety. He is not on steroids. His constipation is not new and is well-controlled with daily softener. He notes no new or worsening neurological complaints. He just continues to have a little lag in finding the right word he wants to use but this is not perceptible to others. Numbness/tingling in right food has resolved. He remains active by doing a lot of walking. He notes no seizure activity and reports that he has been being diligent about not missing does of his Keppra.     He is clinically appropriate and labs and preliminary EKG acceptable to continue with clinical trial:  A Randomized, Double-Blind, Placebo-Controlled Phase 3 Study of Enzastaurin Added to Temozolomide During and Following Radiation Therapy in Newly Diagnosed Glioblastoma Patients Who Possess the Novel Genomic Biomarker DGM1.     Of note, the patient is planning a move to [place] in November. He has been in touch with a radiation oncologist (Dr. Jeremy Rivers) at the [facility] there and they seem open to taking on his case. Research coordinator planning to reach out to their team to discuss logistics to ensure continuity of care.    PROBLEM LIST  Glioblastoma  Aphasia    PLAN  -CANCER-DIRECTED THERAPY-  -As above; continue temozolomide (160 mg daily) as part of clinical trial today  -Supportive medications;        -Anti-nausea: Compazine (Zofran is not allowed on the clinical trial).       -For lymphopenia, prophylactic antibiotics are recommended during  concurrent treatment: Sulfamethoxazole-trimethoprim (Bactrim) 800 mg-160 mg; 1 tab orally Monday, Wednesday, and Friday for pneumocystis prophylaxis.       -Bowel regimen: Docusate 100 mg; 1 cap orally 3 times a day (stool softener for constipation) and Senna 8.6 m tabs orally at bedtime (laxative as needed for constipation).    -STEROIDS-  -Currently off dexamethasone.    -SEIZURE MANAGEMENT-  -No concern for seizure activity.   -Given history of seizures, will continue current antiepileptic regimen of Keppra for the foreseeable future.  -On Keppra. Has been diligent about taking this since discussion at last visit.    -Quality of life/ MOOD/ FATIGUE-  -Has previously noted anxiety around diagnosis and treatment. He saw Dr. Lane and found this to be helpful in developing strategies specifically for navigating discussions with friends. Will continue to see Dr. Lane.  -Continue to monitor mood as untreated/ undertreated depression can worsen fatigue, dysorexia, and quality of life.    Return to clinic in approximately 3-4 weeks per clinical trial requirements.     Amber Scheierl, CNP    70 minutes spent on the date of the encounter doing chart review, review of test results, interpretation of tests, patient visit, documentation and discussion with other provider(s)

## 2022-09-21 NOTE — NURSING NOTE
DENOVO CLINICAL TRIAL VISIT NOTE      Date of Visit: 9/21/2022     Study: A Randomized, Double-Blind, Placebo-Controlled Phase 3 Study of Enzastaurin Added to Temozolomide During and Following Radiation Therapy in Newly Diagnosed Glioblastoma Patients Who Possess the Novel Genomic Biomarker DGM1     [facility]#: 2020   [facility]#: [id number]  IDS#: [id number]  Subject Name: Laz Stein  Subject ID: [id number]     Visit: Day 15     Did the study visit occur within the appropriate window allowed by the protocol? Yes, +1 day which is within window     Clinical Trial Treatment History:  -7/2022 PRESENTATION: Conductive aphasia, seizure activity.  -7/19/2022 MR brain imaging demonstrated a heterogeneously enhancing mixed solid and cystic mass in the left parietal lobe. Mild local mass effect and no midline shift.   -7/20/2022 SURGERY: Craniotomy with 5ALA-guided resection by Dr. Reese.   PATHOLOGY: Glioblastoma; IDH1, IDH2 wildtype. MGMT promoter unmethylated.               BRAF, PTEN, TP53 not mutated.              This infiltrating glioma shows palisading tumor necrosis and microvascular proliferation.  Post-operative MR brain imaging demonstrated gross total resection.  -8/12/2022 NEURO-ONC: Recommending upfront chemoradiotherapy on or off a clinical trial.   -8/22/2022 NEURO-ONC/ CLINICAL TRIAL:Clinically with numbness in the right leg. Screening labs. Planning MRI tomorrow.   -9/6/22 NEURO-ONC/CHEMO-RAD/Starts DENOVO clinical trial (temozolomide 160 mg daily and enzastaurin 250 mg daily vs placebo). Seeing Dr. Lane tomorrow regarding therapy for stressors.  -9/13/2022 NEURO-ONC/CHEMO-RAD/CLINICAL TRIAL: Lab visit only.  -9/21/2022 LULI-ONC/CHEMO-RAD/CLINICAL TRIAL: Provider visit and labs.     Vitals:   98.2 F (36.8) Oral  Resp 16  Pulse 75  /83  95% SpO2  Wt 88.043 kg    - All criteria grade 0/normal.     Baseline Weight: 85.6 kg  % Weight Change from Baseline: 2.8% increase  EKG completed.  QTcF Interval  (Fridericia's): 408 msec    Abnormal Assessment Findings Per Amber Scheierl, PA, Research RN, and AE Grading:     Medical History:              Noting tingling/ numbness in the right leg, but sensation intact to light touch throughout.  - Intermittent paresthesia: bilateral lower extremity: Grade 1              Mild frontal headache at times, none currently  - Intermittent headache: Grade 1    New or worsening AEs:   Slightly increased pruritus and dermatitis of the left parietal scalp without desquamation  - Dermatitis radiation: Start Grade 1   Mild alopecia of left parietal scalp  - Alopecia: Start Grade 1   Feels like he'll occasionally struggle to choose the right word but not noticeable to those around him  - Dysphasia: Start Grade 1   Occasional gas and constipation  - Constipation: Start Grade 1    - All other criteria grade 0/normal.     I have personally interviewed Laz Stein and reviewed his medical record for adverse events and these have been recorded on the corresponding logs in Laz Stein's research file.      CONCOMITANT MEDICATIONS:   I have personally reviewed concomitant medications and these have been recorded on the corresponding logs in Matt's research file.  See full list of medications below:     Current Outpatient Medications   Medication Sig Dispense Refill     levETIRAcetam (KEPPRA) 1000 MG tablet Take 1 tablet (1,000 mg) by mouth 2 times daily 60 tablet 11     prochlorperazine (COMPAZINE) 10 MG tablet Take 1 tablet (10 mg) by mouth every 6 hours as needed (Nausea/Vomiting) 30 tablet 2     study - enzastaurin vs placebo, IDS# [id number], 125 mg TABS tablet Take 2 tablets (250 mg) by mouth daily for 42 doses Within 30 minutes of completing a meal, preferably approximately the same time each day. The tablets must be swallowed whole, no crushing or breaking allowed. 84 tablet 0     sulfamethoxazole-trimethoprim (BACTRIM DS) 800-160 MG tablet Take 1 tablet by mouth Every Mon,  Wed, Fri Morning To start with the start of radiation. 18 tablet 0     temozolomide (TEMODAR) 140 MG capsule Take 1 capsule (140 mg) by mouth daily for 42 doses with 1 other temozolomide prescription for 160 mg total Take at bedtime on an empty stomach  0     temozolomide (TEMODAR) 20 MG capsule Take 1 capsule (20 mg) by mouth daily for 42 doses with 1 other temozolomide prescription for 160 mg total Take at bedtime on an empty stomach 42 capsule 0         DRUG ALLERGIES:    No Known Allergies    LAB RESULTS:  Labs including CBC, CMP, Mag, and Direct Bili were drawn. Labs were reviewed- any significant lab values were addressed and reviewed.       - All criteria grade 0/normal or abnormal but not accompanied by clinical symptoms or leading to treatment changes, additional therapies, medical interventions, or additional testing or not scored per CTCAE criteria.     Performance Status (KPS): 100    Percentage    100 Normal, no complaints, no evidence of disease   90 Able to carry on normal activity; minor signs or symptoms of disease   80 Normal activity with effort; some signs or symptoms of disease   70 Cares for self; unable to carry on normal activity or do active work   60 Requires occasional assistance, but is able to care for most of his/her needs   50 Requires considerable assistance and frequent medical care   40 Disabled; requires special care and assistance   30 Severely disabled, hospitalization indicated. Death not imminent   20 Very sick, hospitalization necessary, active supportive treatment necessary   10 Moribund, fatal processes, progressing rapidly   0 Dead       Overall Treatment Plan for Clinical Trial:   This is a randomized controlled Phase 3 trial evaluating the effect of enzastaurin in patients with glioblastoma, where the primary endpoint is OS. Patients with newly diagnosed glioblastoma who meet all eligibility criteria will be enrolled. Randomization will occur within approximately 6 weeks  after resection of the glioblastoma. Study treatment (Day 1) is recommended to begin on the same day or no later than 3 days after randomization. The 3 phases of the study are the same as in the run-in part except for patients receiving placebo in place of enzastaurin. Patients will be randomized 1:1 to Arm A: RT plus temozolomide and enzastaurin for 6 weeks, ending with the last dose of RT and Day 42 of temozolomide and enzastaurin therapy (Concurrent Phase), followed by enzastaurin alone for 21 to 35 days (Single-Agent Phase), then temozolomide for 6 cycles (or up to 12 cycles according to SOC) and enzastaurin up to a total of 24 cycles (1 cycle = 28 days) (Adjuvant Phase); or Arm B: RT plus temozolomide and placebo for 6 weeks, ending with Day 42 of temozolomide and placebo therapy (Concurrent Phase) followed by placebo alone for 21 to 35 days (Single-Agent Phase), then temozolomide for 6 cycles (or up to 12 cycles according to SOC) and placebo up to a total of 24 cycles (1 cycle = 28 days) (Adjuvant Phase).     Since temozolomide should be administered on an empty stomach or at least 2 hours after a meal and enzastaurin/placebo should be administered within 30 minutes after a meal, patients should take temozolomide on an empty stomach, after an appropriate wait (recommend ~60 minutes) eat a meal, and then take the enzastaurin/placebo dose. Alternatively, patients may take temozolomide at bedtime and enzastaurin/placebo in the morning with or within 30 minutes after breakfast.    Research RN Impression:   Matt looks well. He's going home to see his daughters in [place] this weekend. He and Tracey would like to move to their home in [place] to be closer to their daughters, while remaining on study. Research RN will reach out to [place] [facility] regarding transferring sites sometime in November if willing to accept Matt as a patient.       Laz Stein was given the opportunity to ask any trial related questions. The patient  will be back for daily radiation and a lab visit next week. The patient knows to call with any new or worsening symptoms or concerns.      Please see provider progress note for full physical exam and other clinical information.      Mikayla Murcia, Clinical Research Coordinator, RN.  [facility] [place], [facility]   Clinical Trials Office  Solid Tumor Unit

## 2022-09-21 NOTE — PROGRESS NOTES
RADIATION ONCOLOGY WEEKLY ON TREATMENT VISIT   Encounter Date: 2022    Patient Name: Laz Stein  MRN: [id number]  : 1970     Disease and Stage: Left parietal glioblastoma, IDH wild-type, MGMT promoter unmethylated  Treatment Site: Partial brain  Current Dose/Planned Total Dose: 2400 / 6000 cGy  Daily Fraction Size: 200 cGy/day, 5 times/week  Concurrent Chemotherapy: Yes  Drug and Frequency: Temozolomide    Treatment Summary:    22: Initiation of radiotherapy    22: Weekly RT visit. Current dose of 600 cGy. Tolerating treatment well.     09/15/22: Weekly RT visit. Current dose of 1600 cGy. Tolerating treatment well.     22: Weekly RT visit. Current dose of 2400 cGy. Mild alopecia.     ED visits/Hospitalizations:  None    Missed Treatments:  None    Subjective: Mr. Stein presents to clinic today for his weekly on-treatment visit. Overall, he is continues to tolerate chemoradiotherapy well and has no pressing concerns or complaints. He has noted slightly increased pruritus and dermatitis of the left parietal scalp that he is treating with topical moisturizers. His ROS are positive for stable, mild fatigue with no other changes over the past week.    ROS:   Constitutional  Pain (0-10): 0  Fatigue: Mild    Nutrition  Diet: Regular    CNS  Headaches: None    Cardiopulmonary  Dyspnea: None  Chest pain: None    GI  Nausea/vomiting: None    Integumentary  Dermatitis: Mild    Objective:   Weight: 88 kg  BP: 143/78  Pulse: 74    General: Healthy-appearing 52-year-old gentleman seated comfortably in a chair in no acute distress  HEENT: Mild alopecia of the left parietal scalp. EOMI.  Pulmonary: No wheezing, stridor or respiratory distress  Skin: Mild erythema of the left parietal scalp without desquamation  Neuro: A/O x3. 5/5 motor strength in bilateral upper/lower extremities. 2+ patellar reflexes bilaterally. Normal fine motor control within the bilateral hands. Normal  gait.    Treatment-related toxicities (CTCAE v5.0):  Alopecia: Grade 1  Dermatitis: Grade 1    Assessment:    Mr. Stein is a 52 year old male with an IDH wild-type, MGMT promoter unmethylated left parietal glioblastoma status post gross total resection. He is receiving adjuvant chemoradiotherapy +/- Enzastaurin per -85. He is tolerating treatment well with no significant acute radiation-induced toxicities.    Plan:   IDH wild-type, MGMT promoter unmethylated left parietal glioblastoma:    Continue chemoradiotherapy    Pain management:    No symptoms requiring medical management    Fluids/Electrolytes/Nutrition:    Continue diet as tolerated    Dermatitis:    Continue PRN moisturizer application to the scalp    Mosaiq chart and setup information reviewed  kVCT images reviewed    Medication list reviewed    Carmelo Hernandez MD/PhD    Dept of Radiation Oncology  [facility]

## 2022-09-21 NOTE — LETTER
"    9/21/2022         RE: Laz Stein  [address]  [place] [address]        Dear Colleague,    Thank you for referring your patient, Laz Stein, to the [facility]. Please see a copy of my visit note below.    NEURO-ONCOLOGY VISIT  Sep 21, 2022    CHIEF COMPLAINT: Mr. 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 Started upfront chemoradiotherapy on [facility]ovo clinical trial on 9/6/22 (Randomized, Double-Blind, Placebo-Controlled Phase 3 Study of Enzastaurin Added to Temozolomide During and Following Radiation Therapy in Newly Diagnosed Glioblastoma Patients Who Possess the Novel Genomic Biomarker DGM1).    Matt presents today today for routine follow up as part of continued monitoring while on treatment with clinical trial.    HISTORY OF PRESENT ILLNESS  -Matt is doing very well today. He reports that he has been tolerating the treatment well with minimal, if any, side effects. He had some mild nausea the first night of treatment but otherwise has not had nausea. Takes the compazine before temozolomide each evening. Appetite is good. Bowels regular with use of softener daily.  -Good energy levels, actually feels a bit more energetic, fidgety. Does a lot of walking for activity.  -Tingling in right foot is better.  -Word finding maybe somewhat better, still feels like he'll occasionally struggle to choose the right word but not noticeable to those around him.  -Some scalp irritation he suspects from radiation. Improved with use of Eucerin.  -Occasional lightheadedness but this was not necessarily unusual before treatment. No dizziness, no chest pain, no shortness of breath.   -On Keppra, no activity concerning for seizures.   -Saw Dr. Lane, felt this was beneficial.  -No headaches, no confusion, no vision changes, no focal " weakness.   -Had eye exam 1 to 2 weeks ago. No concerns other than age-related changes per patient.    Review of Systems:  Patient denies any of the following except if noted above: fevers, chills, difficulty with energy, vision or hearing changes, chest pain, dyspnea, abdominal pain, nausea, vomiting, diarrhea, constipation, urinary concerns, headaches, numbness, tingling, issues with sleep or mood. He also denies lumps, bumps, rashes or skin lesions, bleeding or bruising issues.    MEDICATIONS   Current Outpatient Medications   Medication Sig Dispense Refill     levETIRAcetam (KEPPRA) 1000 MG tablet Take 1 tablet (1,000 mg) by mouth 2 times daily 60 tablet 11     prochlorperazine (COMPAZINE) 10 MG tablet Take 1 tablet (10 mg) by mouth every 6 hours as needed (Nausea/Vomiting) 30 tablet 2     study - enzastaurin vs placebo, IDS# [id number], 125 mg TABS tablet Take 2 tablets (250 mg) by mouth daily for 42 doses Within 30 minutes of completing a meal, preferably approximately the same time each day. The tablets must be swallowed whole, no crushing or breaking allowed. 84 tablet 0     sulfamethoxazole-trimethoprim (BACTRIM DS) 800-160 MG tablet Take 1 tablet by mouth Every Mon, Wed, Fri Morning To start with the start of radiation. 18 tablet 0     temozolomide (TEMODAR) 140 MG capsule Take 1 capsule (140 mg) by mouth daily for 42 doses with 1 other temozolomide prescription for 160 mg total Take at bedtime on an empty stomach  0     temozolomide (TEMODAR) 20 MG capsule Take 1 capsule (20 mg) by mouth daily for 42 doses with 1 other temozolomide prescription for 160 mg total Take at bedtime on an empty stomach 42 capsule 0     DRUG ALLERGIES No Known Allergies      ONCOLOGIC HISTORY  -7/2022 PRESENTATION: Conductive aphasia, seizure activity.  -7/19/2022 MR brain imaging demonstrated a heterogeneously enhancing mixed solid and cystic mass in the left parietal lobe. Mild local mass effect and no midline shift.   -7/20/2022  "SURGERY: Craniotomy with 5ALA-guided resection by Dr. Reese.   PATHOLOGY: Glioblastoma; IDH1, IDH2 wildtype. MGMT promoter unmethylated.    BRAF, PTEN, TP53 not mutated.   This infiltrating glioma shows palisading tumor necrosis and microvascular proliferation.  Post-operative MR brain imaging demonstrated gross total resection.  -8/12/2022 NEURO-ONC: Recommending upfront chemoradiotherapy on or off a clinical trial.   -8/22/2022 NEURO-ONC/ CLINICAL TRIAL: Clinically with numbness in the right leg. Screening labs. Planning MRI tomorrow.   -9/6/22 NEURO-ONC/CHEMO-RAD/Starts DENOVO clinical trial (temozolomide 160 mg daily and enzastaurin 250 mg daily vs placebo)    PHYSICAL EXAMINATION  /83 (BP Location: Left arm, Patient Position: Sitting, Cuff Size: Adult Regular)   Pulse 75   Temp 98.2  F (36.8  C) (Oral)   Resp 16   Wt 88 kg (194 lb 1.6 oz)   SpO2 95%   BMI 25.86 kg/m     Wt Readings from Last 2 Encounters:   09/21/22 88 kg (194 lb)   09/21/22 88 kg (194 lb 1.6 oz)      Ht Readings from Last 2 Encounters:   08/12/22 1.845 m (6' 0.64\")   07/19/22 1.829 m (6')     KPS: 100    General: Well-appearing male in no acute distress.  Eyes: EOMI, PERRL. No scleral icterus.  ENT: Oral mucosa is moist without lesions or thrush.   Cardiovascular: RRR No murmurs, gallops, or rubs.   Respiratory: CTA bilaterally. No wheezes or crackles.  Gastrointestinal: BS +. Abdomen soft, non-tender. No palpable hepatosplenomegaly or masses.   Skin: No rashes, petechiae, or bruising noted on exposed skin.    -Neurologic:   MENTAL STATUS:     Alert, oriented to date.   Speech fluent.    Comprehension intact to multi-step commands.   Good right-left orientation.     CRANIAL NERVES:     Pupils are equal, round.    Extraocular movements full, denies diplopia.     Visual fields full.     Symmetric facial movements.   Hearing intact.   No dysarthria.  MOTOR:    No pronation or drift.    Able to rise from a chair without use of " arms.   On toe/ heel walk, equal distance from floor to heels/ toes.   SENSATION: Sensation intact to light touch throughout.  COORDINATION: Intact finger-nose with eyes open.  GAIT:  Walks without assistance.   Good speed. Normal stride length and heel strike. Normal turns. Normal arm swing.   Able to toe, heel walk. Able to tandem walk.       MEDICAL RECORDS  Not applicable    LABS  Personally reviewed all available lab results; CBC, CMP, bilirubin direct, magnesium. Preliminary EKG also reviewed    IMAGING  No new imaging.    BETY Gutierrez is doing well today. He is pleasantly surprised at how well he is tolerating his treatment. He has not noticed any notable side effects from treatment. Maybe just a bit more restless/fidgety. He is not on steroids. His constipation is not new and is well-controlled with daily softener. He notes no new or worsening neurological complaints. He just continues to have a little lag in finding the right word he wants to use but this is not perceptible to others. Numbness/tingling in right food has resolved. He remains active by doing a lot of walking. He notes no seizure activity and reports that he has been being diligent about not missing does of his Keppra.     He is clinically appropriate and labs and preliminary EKG acceptable to continue with clinical trial:  A Randomized, Double-Blind, Placebo-Controlled Phase 3 Study of Enzastaurin Added to Temozolomide During and Following Radiation Therapy in Newly Diagnosed Glioblastoma Patients Who Possess the Novel Genomic Biomarker DGM1.     Of note, the patient is planning a move to [place] in November. He has been in touch with a radiation oncologist (Dr. Jeremy Rivers) at the [facility] there and they seem open to taking on his case. Research coordinator planning to reach out to their team to discuss logistics to ensure continuity of care.    PROBLEM LIST  Glioblastoma  Aphasia    PLAN  -CANCER-DIRECTED THERAPY-  -As  above; continue temozolomide (160 mg daily) as part of clinical trial today  -Supportive medications;        -Anti-nausea: Compazine (Zofran is not allowed on the clinical trial).       -For lymphopenia, prophylactic antibiotics are recommended during concurrent treatment: Sulfamethoxazole-trimethoprim (Bactrim) 800 mg-160 mg; 1 tab orally Monday, Wednesday, and Friday for pneumocystis prophylaxis.       -Bowel regimen: Docusate 100 mg; 1 cap orally 3 times a day (stool softener for constipation) and Senna 8.6 m tabs orally at bedtime (laxative as needed for constipation).    -STEROIDS-  -Currently off dexamethasone.    -SEIZURE MANAGEMENT-  -No concern for seizure activity.   -Given history of seizures, will continue current antiepileptic regimen of Keppra for the foreseeable future.  -On Keppra. Has been diligent about taking this since discussion at last visit.    -Quality of life/ MOOD/ FATIGUE-  -Has previously noted anxiety around diagnosis and treatment. He saw Dr. Lane and found this to be helpful in developing strategies specifically for navigating discussions with friends. Will continue to see Dr. Lane.  -Continue to monitor mood as untreated/ undertreated depression can worsen fatigue, dysorexia, and quality of life.    Return to clinic in approximately 3-4 weeks per clinical trial requirements.     Amber Scheierl, CNP    70 minutes spent on the date of the encounter doing chart review, review of test results, interpretation of tests, patient visit, documentation and discussion with other provider(s)       Again, thank you for allowing me to participate in the care of your patient.      Sincerely,    Amber J. Scheierl, APRN CNP

## 2022-09-21 NOTE — NURSING NOTE
Chief Complaint   Patient presents with     Blood Draw     Vpt blood draw by lab RN only     Annabel Tinajero RN

## 2022-09-22 ENCOUNTER — APPOINTMENT (OUTPATIENT)
Dept: RADIATION ONCOLOGY | Facility: CLINIC | Age: 52
End: 2022-09-22
Attending: RADIOLOGY
Payer: COMMERCIAL

## 2022-09-22 PROCEDURE — 77014 PR CT GUIDE FOR PLACEMENT RADIATION THERAPY FIELDS: CPT | Mod: 26 | Performed by: RADIOLOGY

## 2022-09-22 PROCEDURE — 77386 HC IMRT TREATMENT DELIVERY, COMPLEX: CPT | Performed by: RADIOLOGY

## 2022-09-23 ENCOUNTER — APPOINTMENT (OUTPATIENT)
Dept: RADIATION ONCOLOGY | Facility: CLINIC | Age: 52
End: 2022-09-23
Attending: RADIOLOGY
Payer: COMMERCIAL

## 2022-09-23 LAB
ATRIAL RATE - MUSE: 71 BPM
DIASTOLIC BLOOD PRESSURE - MUSE: NORMAL MMHG
INTERPRETATION ECG - MUSE: NORMAL
P AXIS - MUSE: 63 DEGREES
PR INTERVAL - MUSE: 168 MS
QRS DURATION - MUSE: 100 MS
QT - MUSE: 386 MS
QTC - MUSE: 419 MS
R AXIS - MUSE: 55 DEGREES
SYSTOLIC BLOOD PRESSURE - MUSE: NORMAL MMHG
T AXIS - MUSE: 57 DEGREES
VENTRICULAR RATE- MUSE: 71 BPM

## 2022-09-23 PROCEDURE — 77386 HC IMRT TREATMENT DELIVERY, COMPLEX: CPT | Performed by: RADIOLOGY

## 2022-09-23 PROCEDURE — 77014 PR CT GUIDE FOR PLACEMENT RADIATION THERAPY FIELDS: CPT | Mod: 26 | Performed by: RADIOLOGY

## 2022-09-26 ENCOUNTER — APPOINTMENT (OUTPATIENT)
Dept: RADIATION ONCOLOGY | Facility: CLINIC | Age: 52
End: 2022-09-26
Attending: RADIOLOGY
Payer: COMMERCIAL

## 2022-09-26 PROCEDURE — 77427 RADIATION TX MANAGEMENT X5: CPT | Performed by: RADIOLOGY

## 2022-09-26 PROCEDURE — 77386 HC IMRT TREATMENT DELIVERY, COMPLEX: CPT | Performed by: RADIOLOGY

## 2022-09-26 PROCEDURE — 77014 PR CT GUIDE FOR PLACEMENT RADIATION THERAPY FIELDS: CPT | Mod: 26 | Performed by: RADIOLOGY

## 2022-09-27 ENCOUNTER — LAB (OUTPATIENT)
Dept: LAB | Facility: CLINIC | Age: 52
End: 2022-09-27
Payer: COMMERCIAL

## 2022-09-27 ENCOUNTER — APPOINTMENT (OUTPATIENT)
Dept: RADIATION ONCOLOGY | Facility: CLINIC | Age: 52
End: 2022-09-27
Attending: RADIOLOGY
Payer: COMMERCIAL

## 2022-09-27 ENCOUNTER — RESEARCH ENCOUNTER (OUTPATIENT)
Dept: ONCOLOGY | Facility: CLINIC | Age: 52
End: 2022-09-27

## 2022-09-27 DIAGNOSIS — C71.9 GLIOBLASTOMA (H): Primary | ICD-10-CM

## 2022-09-27 LAB
BASOPHILS # BLD AUTO: 0.1 10E3/UL (ref 0–0.2)
BASOPHILS NFR BLD AUTO: 1 %
EOSINOPHIL # BLD AUTO: 0.2 10E3/UL (ref 0–0.7)
EOSINOPHIL NFR BLD AUTO: 4 %
ERYTHROCYTE [DISTWIDTH] IN BLOOD BY AUTOMATED COUNT: 13 % (ref 10–15)
HCT VFR BLD AUTO: 46.8 % (ref 40–53)
HGB BLD-MCNC: 15.4 G/DL (ref 13.3–17.7)
IMM GRANULOCYTES # BLD: 0 10E3/UL
IMM GRANULOCYTES NFR BLD: 0 %
LYMPHOCYTES # BLD AUTO: 1.1 10E3/UL (ref 0.8–5.3)
LYMPHOCYTES NFR BLD AUTO: 19 %
MCH RBC QN AUTO: 29.4 PG (ref 26.5–33)
MCHC RBC AUTO-ENTMCNC: 32.9 G/DL (ref 31.5–36.5)
MCV RBC AUTO: 89 FL (ref 78–100)
MONOCYTES # BLD AUTO: 0.6 10E3/UL (ref 0–1.3)
MONOCYTES NFR BLD AUTO: 10 %
NEUTROPHILS # BLD AUTO: 3.7 10E3/UL (ref 1.6–8.3)
NEUTROPHILS NFR BLD AUTO: 66 %
NRBC # BLD AUTO: 0 10E3/UL
NRBC BLD AUTO-RTO: 0 /100
PLATELET # BLD AUTO: 265 10E3/UL (ref 150–450)
RBC # BLD AUTO: 5.24 10E6/UL (ref 4.4–5.9)
WBC # BLD AUTO: 5.7 10E3/UL (ref 4–11)

## 2022-09-27 PROCEDURE — 77386 HC IMRT TREATMENT DELIVERY, COMPLEX: CPT | Performed by: RADIOLOGY

## 2022-09-27 PROCEDURE — 77014 PR CT GUIDE FOR PLACEMENT RADIATION THERAPY FIELDS: CPT | Mod: 26 | Performed by: RADIOLOGY

## 2022-09-27 PROCEDURE — 77336 RADIATION PHYSICS CONSULT: CPT | Performed by: RADIOLOGY

## 2022-09-27 PROCEDURE — 99001 SPECIMEN HANDLING PT-LAB: CPT | Performed by: PATHOLOGY

## 2022-09-27 PROCEDURE — 85025 COMPLETE CBC W/AUTO DIFF WBC: CPT | Performed by: PATHOLOGY

## 2022-09-27 PROCEDURE — 36415 COLL VENOUS BLD VENIPUNCTURE: CPT | Performed by: PATHOLOGY

## 2022-09-27 NOTE — NURSING NOTE
[facility] CLINICAL TRIAL VISIT NOTE      Date of Visit: 9/27/2022     Study: A Randomized, Double-Blind, Placebo-Controlled Phase 3 Study of Enzastaurin Added to Temozolomide During and Following Radiation Therapy in Newly Diagnosed Glioblastoma Patients Who Possess the Novel Genomic Biomarker DGM1     [facility]#: [id number]   [id number]  IDS#: [id number]  Subject Name: Laz Stein  Subject ID: [id number]     Visit: Concurrent Phase, Week 4, Day 22     Did the study visit occur within the appropriate window allowed by the protocol? Yes     Clinical Trial Treatment History:  -7/2022 PRESENTATION: Conductive aphasia, seizure activity.  -7/19/2022 MR brain imaging demonstrated a heterogeneously enhancing mixed solid and cystic mass in the left parietal lobe. Mild local mass effect and no midline shift.   -7/20/2022 SURGERY: Craniotomy with 5ALA-guided resection by Dr. Reese.   PATHOLOGY: Glioblastoma; IDH1, IDH2 wildtype. MGMT promoter unmethylated.               BRAF, PTEN, TP53 not mutated.              This infiltrating glioma shows palisading tumor necrosis and microvascular proliferation.  Post-operative MR brain imaging demonstrated gross total resection.  -8/12/2022 NEURO-ONC: Recommending upfront chemoradiotherapy on or off a clinical trial.   -8/22/2022 NEURO-ONC/ CLINICAL TRIAL:Clinically with numbness in the right leg. Screening labs. Planning MRI tomorrow.   -9/6/22 NEURO-ONC/CHEMO-RAD/Starts DENOVO clinical trial (temozolomide 160 mg daily and enzastaurin 250 mg daily vs placebo). Seeing Dr. Lane tomorrow regarding therapy for stressors.  -9/13/2022 NEURO-ONC/CHEMO-RAD/CLINICAL TRIAL: Lab visit only.  -9/21/2022 [facility]-ONC/CHEMO-RAD/CLINICAL TRIAL: Provider visit and labs.   -9/27/2022 [facility]-ONC/CHEMO-RAD/CLINICAL TRIAL: Lab visit only.     Vitals:   There were no vitals taken for this visit.    I have personally interviewed Laz Stein and reviewed his medical record for adverse events and these have  been recorded on the corresponding logs in Laz Stein's research file.      CONCOMITANT MEDICATIONS:   I have personally reviewed concomitant medications and these have been recorded on the corresponding logs in Matt's research file.  See full list of medications below:     Current Outpatient Medications   Medication Sig Dispense Refill     levETIRAcetam (KEPPRA) 1000 MG tablet Take 1 tablet (1,000 mg) by mouth 2 times daily 60 tablet 11     prochlorperazine (COMPAZINE) 10 MG tablet Take 1 tablet (10 mg) by mouth every 6 hours as needed (Nausea/Vomiting) 30 tablet 2     study - enzastaurin vs placebo, IDS# [id number], 125 mg TABS tablet Take 2 tablets (250 mg) by mouth daily for 42 doses Within 30 minutes of completing a meal, preferably approximately the same time each day. The tablets must be swallowed whole, no crushing or breaking allowed. 84 tablet 0     sulfamethoxazole-trimethoprim (BACTRIM DS) 800-160 MG tablet Take 1 tablet by mouth Every Mon, Wed, Fri Morning To start with the start of radiation. 18 tablet 0     temozolomide (TEMODAR) 140 MG capsule Take 1 capsule (140 mg) by mouth daily for 42 doses with 1 other temozolomide prescription for 160 mg total Take at bedtime on an empty stomach  0     temozolomide (TEMODAR) 20 MG capsule Take 1 capsule (20 mg) by mouth daily for 42 doses with 1 other temozolomide prescription for 160 mg total Take at bedtime on an empty stomach 42 capsule 0         DRUG ALLERGIES:    No Known Allergies    LAB RESULTS:  Labs including CBC and research kit for pre-dose urine were drawn. Labs were reviewed- any significant lab values were addressed and reviewed.       - All criteria grade 0/normal or abnormal but not accompanied by clinical symptoms or leading to treatment changes, additional therapies, medical interventions, or additional testing or not scored per CTCAE criteria.     Performance Status (KPS): 100    Percentage    100 Normal, no complaints, no evidence of  disease   90 Able to carry on normal activity; minor signs or symptoms of disease   80 Normal activity with effort; some signs or symptoms of disease   70 Cares for self; unable to carry on normal activity or do active work   60 Requires occasional assistance, but is able to care for most of his/her needs   50 Requires considerable assistance and frequent medical care   40 Disabled; requires special care and assistance   30 Severely disabled, hospitalization indicated. Death not imminent   20 Very sick, hospitalization necessary, active supportive treatment necessary   10 Moribund, fatal processes, progressing rapidly   0 Dead     Overall Treatment Plan for Clinical Trial:   This is a randomized controlled Phase 3 trial evaluating the effect of enzastaurin in patients with glioblastoma, where the primary endpoint is OS. Patients with newly diagnosed glioblastoma who meet all eligibility criteria will be enrolled. Randomization will occur within approximately 6 weeks after resection of the glioblastoma. Study treatment (Day 1) is recommended to begin on the same day or no later than 3 days after randomization. The 3 phases of the study are the same as in the run-in part except for patients receiving placebo in place of enzastaurin. Patients will be randomized 1:1 to Arm A: RT plus temozolomide and enzastaurin for 6 weeks, ending with the last dose of RT and Day 42 of temozolomide and enzastaurin therapy (Concurrent Phase), followed by enzastaurin alone for 21 to 35 days (Single-Agent Phase), then temozolomide for 6 cycles (or up to 12 cycles according to SOC) and enzastaurin up to a total of 24 cycles (1 cycle = 28 days) (Adjuvant Phase); or Arm B: RT plus temozolomide and placebo for 6 weeks, ending with Day 42 of temozolomide and placebo therapy (Concurrent Phase) followed by placebo alone for 21 to 35 days (Single-Agent Phase), then temozolomide for 6 cycles (or up to 12 cycles according to SOC) and placebo up to  a total of 24 cycles (1 cycle = 28 days) (Adjuvant Phase).     Since temozolomide should be administered on an empty stomach or at least 2 hours after a meal and enzastaurin/placebo should be administered within 30 minutes after a meal, patients should take temozolomide on an empty stomach, after an appropriate wait (recommend ~60 minutes) eat a meal, and then take the enzastaurin/placebo dose. Alternatively, patients may take temozolomide at bedtime and enzastaurin/placebo in the morning with or within 30 minutes after breakfast.     Research RN Impression:   Matt is here for labs. Research RN met him to get a JOSE form from him to send records and imaging to the [facility] in [place], [place]. He looks well and labs are unremarkable. He took the study drug after labs today.     Laz Stein was given the opportunity to ask any trial related questions. The patient will be back for daily radiation and a lab and provider visit next week. The patient knows to call with any new or worsening symptoms or concerns.      Please see provider progress note for full physical exam and other clinical information.      Mikayla Murcia, Clinical Research Coordinator, RN.  [facility], [facility]   Clinical Trials Office  Solid Tumor Unit

## 2022-09-28 ENCOUNTER — VIRTUAL VISIT (OUTPATIENT)
Dept: ONCOLOGY | Facility: CLINIC | Age: 52
End: 2022-09-28
Attending: PSYCHOLOGIST
Payer: COMMERCIAL

## 2022-09-29 ENCOUNTER — OFFICE VISIT (OUTPATIENT)
Dept: RADIATION ONCOLOGY | Facility: CLINIC | Age: 52
End: 2022-09-29
Attending: RADIOLOGY
Payer: COMMERCIAL

## 2022-09-29 VITALS
HEART RATE: 67 BPM | SYSTOLIC BLOOD PRESSURE: 155 MMHG | DIASTOLIC BLOOD PRESSURE: 79 MMHG | BODY MASS INDEX: 25.89 KG/M2 | WEIGHT: 194.3 LBS

## 2022-09-29 DIAGNOSIS — C71.9 GLIOBLASTOMA (H): Primary | ICD-10-CM

## 2022-09-30 ENCOUNTER — APPOINTMENT (OUTPATIENT)
Dept: RADIATION ONCOLOGY | Facility: CLINIC | Age: 52
End: 2022-09-30
Attending: RADIOLOGY
Payer: COMMERCIAL

## 2022-09-30 PROCEDURE — 77014 PR CT GUIDE FOR PLACEMENT RADIATION THERAPY FIELDS: CPT | Mod: 26 | Performed by: RADIOLOGY

## 2022-09-30 PROCEDURE — 77386 HC IMRT TREATMENT DELIVERY, COMPLEX: CPT | Performed by: RADIOLOGY

## 2022-09-30 NOTE — PROGRESS NOTES
RADIATION ONCOLOGY WEEKLY ON TREATMENT VISIT   Encounter Date: 2022    Patient Name: Laz Stein  MRN: [id number]  : 1970     Disease and Stage: Left parietal glioblastoma, IDH wild-type, MGMT promoter unmethylated  Treatment Site: Partial brain  Current Dose/Planned Total Dose: 3200 / 6000 cGy  Daily Fraction Size: 200 cGy/day, 5 times/week  Concurrent Chemotherapy: Yes  Drug and Frequency: Temozolomide    Treatment Summary:    22: Initiation of radiotherapy    22: Weekly RT visit. Current dose of 600 cGy. Tolerating treatment well.     09/15/22: Weekly RT visit. Current dose of 1600 cGy. Tolerating treatment well.     22: Weekly RT visit. Current dose of 2400 cGy. Mild alopecia.     22: Weekly RT visit. Current dose of 3200 cGy. Marked alopecia.     ED visits/Hospitalizations:  None    Missed Treatments:  1. 2022 - 2022: 2-day break between fractions 16-17 secondary to machine downtime.    Subjective: Mr. Stein presents to clinic today for his weekly on-treatment visit. Overall, his ROS are positive for increasing alopecia of the left parietal scalp with mild treatment-induced dermatitis that he is treating with over-the-counter moisturizers. He otherwise continues to have mild fatigue with no additional neurologic symptoms appreciated. His primary concern today is related to the unanticipated break in treatment over the last 2 days due to machine downtime.    ROS:   Constitutional  Pain (0-10): 0   Fatigue: None    Nutrition  Diet: Regular    CNS  Headaches: Mild    Cardiopulmonary  Dyspnea: None  Chest pain: None    GI  Nausea/vomiting: None    Integumentary  Dermatitis: Mild    Objective:   Weight: 88.1 kg  BP: 155/79   Pulse: 67     General: Healthy-appearing 52-year-old gentleman seated comfortably in a chair in no acute distress  HEENT: NC/AT. Marked alopecia of the left parietal scalp.  Pulmonary: No wheezing, stridor or respiratory  distress  Skin: Mild erythema of the left parietal scalp. No desquamation.  Neuro: A/O x3. 5/5 motor strength in bilateral upper/lower extremities. 1+ patellar reflexes bilaterally. Normal fine motor control within the bilateral hands. Normal gait.    Treatment-related toxicities (CTCAE v5.0):  Alopecia: Grade 2   Dermatitis: Grade 1     Assessment:    Mr. Stein is a 52 year old male with an IDH wild-type, MGMT promoter unmethylated left parietal glioblastoma status post gross total resection. He is receiving adjuvant chemoradiotherapy +/- Enzastaurin per -20. He is tolerating treatment well with the anticipated acute radiation-induced alopecia and dermatitis.    Plan:   IDH wild-type, MGMT promoter unmethylated left parietal glioblastoma:    Continue chemoradiotherapy    We will make up missed fractions over the weekend assuming machine is up and running on 9/30/2022    Pain management:    No symptoms requiring medical management    Fluids/Electrolytes/Nutrition:    Continue diet as tolerated    Dermatitis:    Continue PRN moisturizer application to the scalp    Mosaiq chart and setup information reviewed  kVCT images reviewed    Medication list reviewed    Carmelo Hernandez MD/PhD    Dept of Radiation Oncology  [facility]

## 2022-10-01 ENCOUNTER — APPOINTMENT (OUTPATIENT)
Dept: RADIATION ONCOLOGY | Facility: CLINIC | Age: 52
End: 2022-10-01
Attending: RADIOLOGY
Payer: COMMERCIAL

## 2022-10-01 PROCEDURE — 77386 HC IMRT TREATMENT DELIVERY, COMPLEX: CPT | Performed by: RADIOLOGY

## 2022-10-02 ENCOUNTER — APPOINTMENT (OUTPATIENT)
Dept: RADIATION ONCOLOGY | Facility: CLINIC | Age: 52
End: 2022-10-02
Attending: RADIOLOGY
Payer: COMMERCIAL

## 2022-10-02 PROCEDURE — 77386 HC IMRT TREATMENT DELIVERY, COMPLEX: CPT | Performed by: RADIOLOGY

## 2022-10-03 ENCOUNTER — APPOINTMENT (OUTPATIENT)
Dept: RADIATION ONCOLOGY | Facility: CLINIC | Age: 52
End: 2022-10-03
Attending: RADIOLOGY
Payer: COMMERCIAL

## 2022-10-03 PROCEDURE — 77427 RADIATION TX MANAGEMENT X5: CPT | Performed by: RADIOLOGY

## 2022-10-03 PROCEDURE — 77386 HC IMRT TREATMENT DELIVERY, COMPLEX: CPT | Performed by: RADIOLOGY

## 2022-10-03 PROCEDURE — 77014 PR CT GUIDE FOR PLACEMENT RADIATION THERAPY FIELDS: CPT | Mod: 26 | Performed by: RADIOLOGY

## 2022-10-04 ENCOUNTER — APPOINTMENT (OUTPATIENT)
Dept: RADIATION ONCOLOGY | Facility: CLINIC | Age: 52
End: 2022-10-04
Attending: RADIOLOGY
Payer: COMMERCIAL

## 2022-10-04 PROCEDURE — 77336 RADIATION PHYSICS CONSULT: CPT | Performed by: RADIOLOGY

## 2022-10-04 PROCEDURE — 77386 HC IMRT TREATMENT DELIVERY, COMPLEX: CPT | Performed by: RADIOLOGY

## 2022-10-04 PROCEDURE — 77014 PR CT GUIDE FOR PLACEMENT RADIATION THERAPY FIELDS: CPT | Mod: 26 | Performed by: RADIOLOGY

## 2022-10-05 ENCOUNTER — APPOINTMENT (OUTPATIENT)
Dept: RADIATION ONCOLOGY | Facility: CLINIC | Age: 52
End: 2022-10-05
Attending: RADIOLOGY
Payer: COMMERCIAL

## 2022-10-05 PROCEDURE — 77014 PR CT GUIDE FOR PLACEMENT RADIATION THERAPY FIELDS: CPT | Mod: 26 | Performed by: RADIOLOGY

## 2022-10-05 PROCEDURE — 77386 HC IMRT TREATMENT DELIVERY, COMPLEX: CPT | Performed by: RADIOLOGY

## 2022-10-06 ENCOUNTER — OFFICE VISIT (OUTPATIENT)
Dept: RADIATION ONCOLOGY | Facility: CLINIC | Age: 52
End: 2022-10-06
Attending: RADIOLOGY
Payer: COMMERCIAL

## 2022-10-06 ENCOUNTER — RESEARCH ENCOUNTER (OUTPATIENT)
Dept: ONCOLOGY | Facility: CLINIC | Age: 52
End: 2022-10-06
Payer: COMMERCIAL

## 2022-10-06 VITALS
HEART RATE: 82 BPM | WEIGHT: 196.3 LBS | SYSTOLIC BLOOD PRESSURE: 126 MMHG | BODY MASS INDEX: 26.16 KG/M2 | DIASTOLIC BLOOD PRESSURE: 63 MMHG

## 2022-10-06 DIAGNOSIS — C71.9 GLIOBLASTOMA (H): Primary | ICD-10-CM

## 2022-10-06 LAB
ALBUMIN SERPL BCG-MCNC: 4.2 G/DL (ref 3.5–5.2)
ALP SERPL-CCNC: 60 U/L (ref 40–129)
ALT SERPL W P-5'-P-CCNC: 15 U/L (ref 10–50)
ANION GAP SERPL CALCULATED.3IONS-SCNC: 9 MMOL/L (ref 7–15)
AST SERPL W P-5'-P-CCNC: 24 U/L (ref 10–50)
BASOPHILS # BLD AUTO: 0.1 10E3/UL (ref 0–0.2)
BASOPHILS NFR BLD AUTO: 1 %
BILIRUB DIRECT SERPL-MCNC: <0.2 MG/DL (ref 0–0.3)
BILIRUB SERPL-MCNC: 0.4 MG/DL
BUN SERPL-MCNC: 15.3 MG/DL (ref 6–20)
CALCIUM SERPL-MCNC: 9.1 MG/DL (ref 8.6–10)
CHLORIDE SERPL-SCNC: 103 MMOL/L (ref 98–107)
CREAT SERPL-MCNC: 0.99 MG/DL (ref 0.67–1.17)
DEPRECATED HCO3 PLAS-SCNC: 25 MMOL/L (ref 22–29)
EOSINOPHIL # BLD AUTO: 0.2 10E3/UL (ref 0–0.7)
EOSINOPHIL NFR BLD AUTO: 3 %
ERYTHROCYTE [DISTWIDTH] IN BLOOD BY AUTOMATED COUNT: 13 % (ref 10–15)
GFR SERPL CREATININE-BSD FRML MDRD: >90 ML/MIN/1.73M2
GLUCOSE SERPL-MCNC: 96 MG/DL (ref 70–99)
HCT VFR BLD AUTO: 45.1 % (ref 40–53)
HGB BLD-MCNC: 14.6 G/DL (ref 13.3–17.7)
IMM GRANULOCYTES # BLD: 0 10E3/UL
IMM GRANULOCYTES NFR BLD: 1 %
LYMPHOCYTES # BLD AUTO: 0.9 10E3/UL (ref 0.8–5.3)
LYMPHOCYTES NFR BLD AUTO: 17 %
MAGNESIUM SERPL-MCNC: 2.1 MG/DL (ref 1.7–2.3)
MCH RBC QN AUTO: 29.2 PG (ref 26.5–33)
MCHC RBC AUTO-ENTMCNC: 32.4 G/DL (ref 31.5–36.5)
MCV RBC AUTO: 90 FL (ref 78–100)
MONOCYTES # BLD AUTO: 0.7 10E3/UL (ref 0–1.3)
MONOCYTES NFR BLD AUTO: 13 %
NEUTROPHILS # BLD AUTO: 3.6 10E3/UL (ref 1.6–8.3)
NEUTROPHILS NFR BLD AUTO: 65 %
NRBC # BLD AUTO: 0 10E3/UL
NRBC BLD AUTO-RTO: 0 /100
PLATELET # BLD AUTO: 265 10E3/UL (ref 150–450)
POTASSIUM SERPL-SCNC: 4.3 MMOL/L (ref 3.4–5.3)
PROT SERPL-MCNC: 6.6 G/DL (ref 6.4–8.3)
RBC # BLD AUTO: 5 10E6/UL (ref 4.4–5.9)
SODIUM SERPL-SCNC: 137 MMOL/L (ref 136–145)
WBC # BLD AUTO: 5.6 10E3/UL (ref 4–11)

## 2022-10-06 PROCEDURE — 36415 COLL VENOUS BLD VENIPUNCTURE: CPT | Performed by: PSYCHIATRY & NEUROLOGY

## 2022-10-06 PROCEDURE — 82248 BILIRUBIN DIRECT: CPT | Performed by: PSYCHIATRY & NEUROLOGY

## 2022-10-06 PROCEDURE — 85025 COMPLETE CBC W/AUTO DIFF WBC: CPT | Performed by: PSYCHIATRY & NEUROLOGY

## 2022-10-06 PROCEDURE — 80053 COMPREHEN METABOLIC PANEL: CPT | Performed by: PSYCHIATRY & NEUROLOGY

## 2022-10-06 PROCEDURE — 77386 HC IMRT TREATMENT DELIVERY, COMPLEX: CPT | Performed by: RADIOLOGY

## 2022-10-06 PROCEDURE — 83735 ASSAY OF MAGNESIUM: CPT | Performed by: PSYCHIATRY & NEUROLOGY

## 2022-10-06 NOTE — PROGRESS NOTES
RADIATION ONCOLOGY WEEKLY ON TREATMENT VISIT   Encounter Date: 2022    Patient Name: Laz Stein  MRN: [id number]  : 1970     Disease and Stage: Left parietal glioblastoma, IDH wild-type, MGMT promoter unmethylated  Treatment Site: Partial brain  Current Dose/Planned Total Dose: 4600 / 6000 cGy  Daily Fraction Size: 200 cGy/day, 5 times/week  Concurrent Chemotherapy: Yes  Drug and Frequency: Temozolomide    Treatment Summary:    22: Initiation of radiotherapy    22: Weekly RT visit. Current dose of 600 cGy. Tolerating treatment well.     09/15/22: Weekly RT visit. Current dose of 1600 cGy. Tolerating treatment well.     22: Weekly RT visit. Current dose of 2400 cGy. Mild alopecia.     22: Weekly RT visit. Current dose of 3200 cGy. Marked alopecia.     10/06/22: Weekly RT visit. Current dose of 4600 cGy. Marked alopecia. Moderate dermatitis.     ED visits/Hospitalizations:  None    Missed Treatments:  1. 2022 - 2022: 2-day break between fractions 16-17 secondary to machine downtime. Missed fractions were made up over the following weekend.    Subjective: Mr. Stein presents to clinic today for his weekly on-treatment visit. Overall, he reports he is doing well with ongoing, mild fatigue as well as mild lightheadedness that is not particularly bothersome to him. His main and ROS are positive for marked alopecia of the left parietal scalp as well as mild to moderate erythema of the overlying skin which she is treating with as needed topical emollients.    ROS:   Constitutional  Pain (0-10): 0  Fatigue: Mild    Nutrition  Diet: Regular    CNS  Headaches: None    Cardiopulmonary  Dyspnea: None  Chest pain: None    GI  Nausea/vomiting: None    Integumentary  Dermatitis: Mild to moderate    Objective:   Weight: 89.0 kg  BP: 126/63  Pulse: 82  KPS: 100    General: Healthy-appearing 52-year-old gentleman seated comfortably in a chair in no acute  HEENT: NC/AT.  Marked alopecia of the left parietal scalp. No rhinorrhea or epistaxis. Moist mucous membranes.  Pulmonary: No wheezing, stridor or respiratory distress  Skin: Mild-moderate erythema of the left parietal scalp. No desquamation.  Neuro: A/O x3. 5/5 motor strength in bilateral upper/lower extremities. 2+ patellar reflexes bilaterally. No clonus. Normal fine motor control within the bilateral hands. Normal gait.  Cranial Nerve Exam  I: Not tested  II: Not tested  III/IV/VI: PERRL. EOMI.   V: Sensation to light touch is intact and symmetric in the bilateral V1-V3 distributions.  VII: No facial weakness or asymmetry.   VIII: Hearing is grossly intact and symmetric.  IX/X: Palate elevates symmetrically. Normal phonation.  XI: Strength is 5/5 in bilateral trapezius and SCM musculature.  XII: Tongue protrudes in the midline. No atrophy or fasciculations.     Treatment-related toxicities (CTCAE v5.0):  Alopecia: Grade 2    Dermatitis: Grade 1      Assessment:    Mr. Stein is a 52 year old male with an IDH wild-type, MGMT promoter unmethylated left parietal glioblastoma status post gross total resection. He is receiving adjuvant chemoradiotherapy +/- Enzastaurin per -91. He tolerating treatment well with the anticipated acute radiation-induced alopecia and dermatitis.    Plan:   IDH wild-type, MGMT promoter unmethylated left parietal glioblastoma:    Continue chemoradiotherapy    Pain management:    No symptoms requiring medical management    Fluids/Electrolytes/Nutrition:    Continue diet as tolerated    Dermatitis:    Continue PRN moisturizer application to the scalp    Mosaiq chart and setup information reviewed  kVCT images reviewed    Medication list reviewed    Carmelo Hernandez MD/PhD    Dept of Radiation Oncology  [facility]

## 2022-10-06 NOTE — NURSING NOTE
DENOVO CLINICAL TRIAL VISIT NOTE      Date of Visit: 10/6/2022     Study: A Randomized, Double-Blind, Placebo-Controlled Phase 3 Study of Enzastaurin Added to Temozolomide During and Following Radiation Therapy in Newly Diagnosed Glioblastoma Patients Who Possess the Novel Genomic Biomarker [id number]     [facility]#: 2020   [facility]#: [id number]  IDS#: [id number]  Subject Name: Laz Stein  Subject ID: [id number]     Visit: Concurrent Phase Week 5 Day 29     Did the study visit occur within the appropriate window allowed by the protocol? Yes, +2 days which is within the +/- 3 day window     Clinical Trial Treatment History:  -7/2022 PRESENTATION: Conductive aphasia, seizure activity.  -7/19/2022 MR brain imaging demonstrated a heterogeneously enhancing mixed solid and cystic mass in the left parietal lobe. Mild local mass effect and no midline shift.   -7/20/2022 SURGERY: Craniotomy with 5ALA-guided resection by Dr. Reese.   PATHOLOGY: Glioblastoma; IDH1, IDH2 wildtype. MGMT promoter unmethylated.               BRAF, PTEN, TP53 not mutated.              This infiltrating glioma shows palisading tumor necrosis and microvascular proliferation.  Post-operative MR brain imaging demonstrated gross total resection.  -8/12/2022 NEURO-ONC: Recommending upfront chemoradiotherapy on or off a clinical trial.   -8/22/2022 NEURO-ONC/ CLINICAL TRIAL:Clinically with numbness in the right leg. Screening labs. Planning MRI tomorrow.   -9/6/22 NEURO-ONC/CHEMO-RAD/Starts DENOVO clinical trial (temozolomide 160 mg daily and enzastaurin 250 mg daily vs placebo). Seeing Dr. Lane tomorrow regarding therapy for stressors.  -9/13/2022 NEURO-ONC/CHEMO-RAD/CLINICAL TRIAL: Lab visit only.  -9/21/2022 NEURO-ONC/CHEMO-RAD/CLINICAL TRIAL: Week 3 Provider visit and labs.   -9/27/2022 NEURO-ONC/CHEMO-RAD/CLINICAL TRIAL: Lab visit only.  -10/7/2022 NEURO-ONC/CHEMO-RAD/CLINICAL TRIAL: Week 5 Provider visit and labs.    Vitals:   /63  Pulse 82  Wt 89.041  kg  *Complete vitals were not charted in flowsheet: Temp 98 F  Resp 16  96% SpO2    - All criteria grade 0/normal.     Baseline Weight: 85.6 kg  % Weight Change from Baseline: 4.0% increase    Abnormal Assessment Findings Per Dr. Hernandez and AE Grading:     Medical History:              Noting tingling/ numbness in the right leg, but sensation intact to light touch throughout.  - Intermittent paresthesia: bilateral lower extremity: Grade 1              Mild frontal headache at times, none currently  - Intermittent headache: Grade 1              He has noted slightly increased pruritus and dermatitis of the left parietal scalp that he is treating with topical moisturizers  - Dermatitis radiation: Grade 1              Feels like he'll occasionally struggle to choose the right word but not noticeable to those around him  - Dysphasia: Grade 1              Occasional gas and constipation  - Constipation: Grade 1  New or worsening AEs:              Mild-moderate erythema of the left parietal scalp. No desquamation  - Alopecia: Start Grade 2                - All other criteria grade 0/normal.     I have personally interviewed Nesbitt DEBBIE Stein and reviewed his medical record for adverse events and these have been recorded on the corresponding logs in Laz Stein's research file.      CONCOMITANT MEDICATIONS:   I have personally reviewed concomitant medications and these have been recorded on the corresponding logs in Matt's research file.  See full list of medications below:     Current Outpatient Medications   Medication Sig Dispense Refill     levETIRAcetam (KEPPRA) 1000 MG tablet Take 1 tablet (1,000 mg) by mouth 2 times daily 60 tablet 11     prochlorperazine (COMPAZINE) 10 MG tablet Take 1 tablet (10 mg) by mouth every 6 hours as needed (Nausea/Vomiting) 30 tablet 2     study - enzastaurin vs placebo, IDS# [id number], 125 mg TABS tablet Take 2 tablets (250 mg) by mouth daily for 42 doses Within 30 minutes of  completing a meal, preferably approximately the same time each day. The tablets must be swallowed whole, no crushing or breaking allowed. 84 tablet 0     sulfamethoxazole-trimethoprim (BACTRIM DS) 800-160 MG tablet Take 1 tablet by mouth Every Mon, Wed, Fri Morning To start with the start of radiation. 18 tablet 0     temozolomide (TEMODAR) 140 MG capsule Take 1 capsule (140 mg) by mouth daily for 42 doses with 1 other temozolomide prescription for 160 mg total Take at bedtime on an empty stomach  0     temozolomide (TEMODAR) 20 MG capsule Take 1 capsule (20 mg) by mouth daily for 42 doses with 1 other temozolomide prescription for 160 mg total Take at bedtime on an empty stomach 42 capsule 0         DRUG ALLERGIES:    No Known Allergies    LAB RESULTS:  Labs including CBC, CMP, Magnesium, and Bilirubin Direct were drawn. Labs were reviewed- any significant lab values were addressed and reviewed.       - All criteria grade 0/normal or abnormal but not accompanied by clinical symptoms or leading to treatment changes, additional therapies, medical interventions, or additional testing or not scored per CTCAE criteria.     Performance Status (KPS): 90    Percentage    100 Normal, no complaints, no evidence of disease   90 Able to carry on normal activity; minor signs or symptoms of disease   80 Normal activity with effort; some signs or symptoms of disease   70 Cares for self; unable to carry on normal activity or do active work   60 Requires occasional assistance, but is able to care for most of his/her needs   50 Requires considerable assistance and frequent medical care   40 Disabled; requires special care and assistance   30 Severely disabled, hospitalization indicated. Death not imminent   20 Very sick, hospitalization necessary, active supportive treatment necessary   10 Moribund, fatal processes, progressing rapidly   0 Dead     Overall Treatment Plan for Clinical Trial:   This is a randomized controlled Phase 3  trial evaluating the effect of enzastaurin in patients with glioblastoma, where the primary endpoint is OS. Patients with newly diagnosed glioblastoma who meet all eligibility criteria will be enrolled. Randomization will occur within approximately 6 weeks after resection of the glioblastoma. Study treatment (Day 1) is recommended to begin on the same day or no later than 3 days after randomization. The 3 phases of the study are the same as in the run-in part except for patients receiving placebo in place of enzastaurin. Patients will be randomized 1:1 to Arm A: RT plus temozolomide and enzastaurin for 6 weeks, ending with the last dose of RT and Day 42 of temozolomide and enzastaurin therapy (Concurrent Phase), followed by enzastaurin alone for 21 to 35 days (Single-Agent Phase), then temozolomide for 6 cycles (or up to 12 cycles according to SOC) and enzastaurin up to a total of 24 cycles (1 cycle = 28 days) (Adjuvant Phase); or Arm B: RT plus temozolomide and placebo for 6 weeks, ending with Day 42 of temozolomide and placebo therapy (Concurrent Phase) followed by placebo alone for 21 to 35 days (Single-Agent Phase), then temozolomide for 6 cycles (or up to 12 cycles according to SOC) and placebo up to a total of 24 cycles (1 cycle = 28 days) (Adjuvant Phase).     Since temozolomide should be administered on an empty stomach or at least 2 hours after a meal and enzastaurin/placebo should be administered within 30 minutes after a meal, patients should take temozolomide on an empty stomach, after an appropriate wait (recommend ~60 minutes) eat a meal, and then take the enzastaurin/placebo dose. Alternatively, patients may take temozolomide at bedtime and enzastaurin/placebo in the morning with or within 30 minutes after breakfast.     Research RN Impression:   Matt was seen by Dr. Hernandez for the study visit. He had no change in labs and a normal neuro exam.     Laz Stein was given the opportunity to ask  any trial related questions. The patient will be back for daily radiation and next week for labs. The patient knows to call with any new or worsening symptoms or concerns.      Please see provider progress note for full physical exam and other clinical information.      Mikayla Murcia, Clinical Research Coordinator, RN.  [facility] [place]   Clinical Trials Office  Solid Tumor Unit

## 2022-10-07 ENCOUNTER — APPOINTMENT (OUTPATIENT)
Dept: RADIATION ONCOLOGY | Facility: CLINIC | Age: 52
End: 2022-10-07
Attending: RADIOLOGY
Payer: COMMERCIAL

## 2022-10-07 PROCEDURE — 77014 PR CT GUIDE FOR PLACEMENT RADIATION THERAPY FIELDS: CPT | Mod: 26 | Performed by: RADIOLOGY

## 2022-10-07 PROCEDURE — 77386 HC IMRT TREATMENT DELIVERY, COMPLEX: CPT | Performed by: RADIOLOGY

## 2022-10-10 ENCOUNTER — APPOINTMENT (OUTPATIENT)
Dept: RADIATION ONCOLOGY | Facility: CLINIC | Age: 52
End: 2022-10-10
Attending: RADIOLOGY
Payer: COMMERCIAL

## 2022-10-10 PROCEDURE — 77427 RADIATION TX MANAGEMENT X5: CPT | Performed by: RADIOLOGY

## 2022-10-10 PROCEDURE — 77386 HC IMRT TREATMENT DELIVERY, COMPLEX: CPT | Performed by: RADIOLOGY

## 2022-10-10 PROCEDURE — 77014 PR CT GUIDE FOR PLACEMENT RADIATION THERAPY FIELDS: CPT | Mod: 26 | Performed by: RADIOLOGY

## 2022-10-11 ENCOUNTER — APPOINTMENT (OUTPATIENT)
Dept: RADIATION ONCOLOGY | Facility: CLINIC | Age: 52
End: 2022-10-11
Attending: RADIOLOGY
Payer: COMMERCIAL

## 2022-10-11 PROCEDURE — 77014 PR CT GUIDE FOR PLACEMENT RADIATION THERAPY FIELDS: CPT | Mod: 26 | Performed by: RADIOLOGY

## 2022-10-11 PROCEDURE — 77336 RADIATION PHYSICS CONSULT: CPT | Performed by: RADIOLOGY

## 2022-10-11 PROCEDURE — 77386 HC IMRT TREATMENT DELIVERY, COMPLEX: CPT | Performed by: RADIOLOGY

## 2022-10-12 ENCOUNTER — APPOINTMENT (OUTPATIENT)
Dept: RADIATION ONCOLOGY | Facility: CLINIC | Age: 52
End: 2022-10-12
Attending: RADIOLOGY
Payer: COMMERCIAL

## 2022-10-12 ENCOUNTER — RESEARCH ENCOUNTER (OUTPATIENT)
Dept: ONCOLOGY | Facility: CLINIC | Age: 52
End: 2022-10-12

## 2022-10-12 VITALS
SYSTOLIC BLOOD PRESSURE: 164 MMHG | HEART RATE: 72 BPM | DIASTOLIC BLOOD PRESSURE: 84 MMHG | WEIGHT: 198.3 LBS | BODY MASS INDEX: 26.42 KG/M2

## 2022-10-12 DIAGNOSIS — C71.9 GLIOBLASTOMA (H): Primary | ICD-10-CM

## 2022-10-12 DIAGNOSIS — C71.9 GLIOBLASTOMA (H): ICD-10-CM

## 2022-10-12 LAB
BASOPHILS # BLD AUTO: 0.1 10E3/UL (ref 0–0.2)
BASOPHILS NFR BLD AUTO: 1 %
EOSINOPHIL # BLD AUTO: 0.1 10E3/UL (ref 0–0.7)
EOSINOPHIL NFR BLD AUTO: 3 %
ERYTHROCYTE [DISTWIDTH] IN BLOOD BY AUTOMATED COUNT: 13.2 % (ref 10–15)
HCT VFR BLD AUTO: 45.4 % (ref 40–53)
HGB BLD-MCNC: 14.8 G/DL (ref 13.3–17.7)
IMM GRANULOCYTES # BLD: 0 10E3/UL
IMM GRANULOCYTES NFR BLD: 1 %
LYMPHOCYTES # BLD AUTO: 0.8 10E3/UL (ref 0.8–5.3)
LYMPHOCYTES NFR BLD AUTO: 14 %
MCH RBC QN AUTO: 29.2 PG (ref 26.5–33)
MCHC RBC AUTO-ENTMCNC: 32.6 G/DL (ref 31.5–36.5)
MCV RBC AUTO: 90 FL (ref 78–100)
MONOCYTES # BLD AUTO: 0.6 10E3/UL (ref 0–1.3)
MONOCYTES NFR BLD AUTO: 12 %
NEUTROPHILS # BLD AUTO: 3.7 10E3/UL (ref 1.6–8.3)
NEUTROPHILS NFR BLD AUTO: 69 %
NRBC # BLD AUTO: 0 10E3/UL
NRBC BLD AUTO-RTO: 0 /100
PLATELET # BLD AUTO: 242 10E3/UL (ref 150–450)
RBC # BLD AUTO: 5.07 10E6/UL (ref 4.4–5.9)
WBC # BLD AUTO: 5.3 10E3/UL (ref 4–11)

## 2022-10-12 PROCEDURE — 77386 HC IMRT TREATMENT DELIVERY, COMPLEX: CPT | Performed by: RADIOLOGY

## 2022-10-12 PROCEDURE — 36415 COLL VENOUS BLD VENIPUNCTURE: CPT

## 2022-10-12 PROCEDURE — 85025 COMPLETE CBC W/AUTO DIFF WBC: CPT | Performed by: PSYCHIATRY & NEUROLOGY

## 2022-10-12 NOTE — NURSING NOTE
DENOVO CLINICAL TRIAL VISIT NOTE      Date of Visit: 10/12/2022     Study: A Randomized, Double-Blind, Placebo-Controlled Phase 3 Study of Enzastaurin Added to Temozolomide During and Following Radiation Therapy in Newly Diagnosed Glioblastoma Patients Who Possess the Novel Genomic Biomarker DGM1     [facility]#: 2020   [facility]#: [id number]  IDS#: [id number]  Subject Name: Laz Stein  Subject ID: [id number]     Visit: Concurrent Phase, Week 6, Day 36 Labs     Did the study visit occur within the appropriate window allowed by the protocol? Yes, within the +/- 3 day window     Clinical Trial Treatment History:  -7/2022 PRESENTATION: Conductive aphasia, seizure activity.  -7/19/2022 MR brain imaging demonstrated a heterogeneously enhancing mixed solid and cystic mass in the left parietal lobe. Mild local mass effect and no midline shift.   -7/20/2022 SURGERY: Craniotomy with 5ALA-guided resection by Dr. Reese.   PATHOLOGY: Glioblastoma; IDH1, IDH2 wildtype. MGMT promoter unmethylated.               BRAF, PTEN, TP53 not mutated.              This infiltrating glioma shows palisading tumor necrosis and microvascular proliferation.  Post-operative MR brain imaging demonstrated gross total resection.  -8/12/2022 NEURO-ONC: Recommending upfront chemoradiotherapy on or off a clinical trial.   -8/22/2022 NEURO-ONC/ CLINICAL TRIAL:Clinically with numbness in the right leg. Screening labs. Planning MRI tomorrow.   -9/6/22 NEURO-ONC/CHEMO-RAD/Starts DENOVO clinical trial (temozolomide 160 mg daily and enzastaurin 250 mg daily vs placebo). Seeing Dr. Lane tomorrow regarding therapy for stressors.  -9/13/2022 NEURO-ONC/CHEMO-RAD/CLINICAL TRIAL: Lab visit only.  -9/21/2022 NEURO-ONC/CHEMO-RAD/CLINICAL TRIAL: Week 3 Provider visit and labs.   -9/27/2022 NEURO-ONC/CHEMO-RAD/CLINICAL TRIAL: Lab visit only.  -10/7/2022 NEURO-ONC/CHEMO-RAD/CLINICAL TRIAL: Week 5 Provider visit and labs.  -10/12/2022 NEURO-ONC/CHEMO-RAD/CLINICAL TRIAL: Lab  visit only.       CONCOMITANT MEDICATIONS:   I have personally reviewed concomitant medications and these have been recorded on the corresponding logs in Matt's research file.  See full list of medications below:     Current Outpatient Medications   Medication Sig Dispense Refill     levETIRAcetam (KEPPRA) 1000 MG tablet Take 1 tablet (1,000 mg) by mouth 2 times daily 60 tablet 11     prochlorperazine (COMPAZINE) 10 MG tablet Take 1 tablet (10 mg) by mouth every 6 hours as needed (Nausea/Vomiting) 30 tablet 2     study - enzastaurin vs placebo, IDS# [id number], 125 mg TABS tablet Take 2 tablets (250 mg) by mouth daily for 42 doses Within 30 minutes of completing a meal, preferably approximately the same time each day. The tablets must be swallowed whole, no crushing or breaking allowed. 84 tablet 0     sulfamethoxazole-trimethoprim (BACTRIM DS) 800-160 MG tablet Take 1 tablet by mouth Every Mon, Wed, Fri Morning To start with the start of radiation. 18 tablet 0     temozolomide (TEMODAR) 140 MG capsule Take 1 capsule (140 mg) by mouth daily for 42 doses with 1 other temozolomide prescription for 160 mg total Take at bedtime on an empty stomach  0     temozolomide (TEMODAR) 20 MG capsule Take 1 capsule (20 mg) by mouth daily for 42 doses with 1 other temozolomide prescription for 160 mg total Take at bedtime on an empty stomach 42 capsule 0         DRUG ALLERGIES:    No Known Allergies    LAB RESULTS:  Labs including CBC were drawn. Labs were reviewed- any significant lab values were addressed and reviewed.       - All criteria grade 0/normal or abnormal but not accompanied by clinical symptoms or leading to treatment changes, additional therapies, medical interventions, or additional testing or not scored per CTCAE criteria.     Overall Treatment Plan for Clinical Trial:   This is a randomized controlled Phase 3 trial evaluating the effect of enzastaurin in patients with glioblastoma, where the primary endpoint is OS.  Patients with newly diagnosed glioblastoma who meet all eligibility criteria will be enrolled. Randomization will occur within approximately 6 weeks after resection of the glioblastoma. Study treatment (Day 1) is recommended to begin on the same day or no later than 3 days after randomization. The 3 phases of the study are the same as in the run-in part except for patients receiving placebo in place of enzastaurin. Patients will be randomized 1:1 to Arm A: RT plus temozolomide and enzastaurin for 6 weeks, ending with the last dose of RT and Day 42 of temozolomide and enzastaurin therapy (Concurrent Phase), followed by enzastaurin alone for 21 to 35 days (Single-Agent Phase), then temozolomide for 6 cycles (or up to 12 cycles according to SOC) and enzastaurin up to a total of 24 cycles (1 cycle = 28 days) (Adjuvant Phase); or Arm B: RT plus temozolomide and placebo for 6 weeks, ending with Day 42 of temozolomide and placebo therapy (Concurrent Phase) followed by placebo alone for 21 to 35 days (Single-Agent Phase), then temozolomide for 6 cycles (or up to 12 cycles according to SOC) and placebo up to a total of 24 cycles (1 cycle = 28 days) (Adjuvant Phase).     Since temozolomide should be administered on an empty stomach or at least 2 hours after a meal and enzastaurin/placebo should be administered within 30 minutes after a meal, patients should take temozolomide on an empty stomach, after an appropriate wait (recommend ~60 minutes) eat a meal, and then take the enzastaurin/placebo dose. Alternatively, patients may take temozolomide at bedtime and enzastaurin/placebo in the morning with or within 30 minutes after breakfast.     Research RN Impression:   Matt presented for his OTV in radiation and had labs done.     Nesbitt G Sarita was given the opportunity to ask any trial related questions. The patient will be back for daily radiation and provider visit next week. The patient knows to call with any new or  worsening symptoms or concerns.      Please see provider progress note for full physical exam and other clinical information.      Mikayla Murcia, Clinical Research Coordinator, RN.  [facility], [facility]   Clinical Trials Office  Solid Tumor Unit

## 2022-10-12 NOTE — PROGRESS NOTES
RADIATION ONCOLOGY WEEKLY ON TREATMENT VISIT   Encounter Date: 2022    Patient Name: Laz Stein  MRN: [id number]  : 1970     Disease and Stage: Left parietal glioblastoma, IDH wild-type, MGMT promoter unmethylated  Treatment Site: Partial brain  Current Dose/Planned Total Dose: 5400 / 6000 cGy  Daily Fraction Size: 200 cGy/day, 5 times/week  Concurrent Chemotherapy: Yes  Drug and Frequency: Temozolomide    Treatment Summary:    22: Initiation of radiotherapy    22: Weekly RT visit. Current dose of 600 cGy. Tolerating treatment well.     09/15/22: Weekly RT visit. Current dose of 1600 cGy. Tolerating treatment well.     22: Weekly RT visit. Current dose of 2400 cGy. Mild alopecia.     22: Weekly RT visit. Current dose of 3200 cGy. Marked alopecia.     10/06/22: Weekly RT visit. Current dose of 4600 cGy. Marked alopecia. Moderate dermatitis.     10/12/22: Weekly RT visit. Current dose of 5400 cGy. Marked alopecia. Moderate dermatitis.     ED visits/Hospitalizations:  None    Missed Treatments:  1. 2022 - 2022: 2-day break between fractions 16-17 secondary to machine downtime. Missed fractions were made up over the following weekend.    Subjective: Mr. Stein presents to clinic today for his weekly on-treatment visit. Overall, he reports he is doing fairly well and has no pressing concerns or complaints. He continues to have marked alopecia of the left parietal scalp as well as mildly increased dermatitis over the past week. He is currently treating the latter with topical Eucerin and reports good relief of his symptoms on this regimen. He specifically denies any significant headaches, nausea/vomiting, focal motor weakness, sensory deficits or other neurologic symptoms with his remaining ROS likewise negative.    ROS:   Constitutional  Pain (0-10): 0  Fatigue: Mild    Nutrition  Diet: Regular    CNS  Headaches: None    Cardiopulmonary  Dyspnea:  None  Chest pain: None    GI  Nausea/vomiting: None    Integumentary  Dermatitis: Moderate    Objective:   Weight: 90 kg  BP: 164/84  Pulse: 72    General: Healthy-appearing 52-year-old gentleman seated comfortably in a chair in no acute distress  HEENT: NC/AT. EOMI. Marked alopecia of the left parietal scalp. Moderate dermatitis of the underlying skin. No desquamation.  Pulmonary: No wheezing, stridor or respiratory distress  Skin: Dermatitis of the left parietal scalp as described above  Neuro: A/O x3. 5/5 motor strength in bilateral upper/lower extremities. 2+ patellar reflexes bilaterally. No clonus. Normal fine motor control within the bilateral hands. Normal gait.    Treatment-related toxicities (CTCAE v5.0):  Alopecia: Grade 2   Dermatitis: Grade 2     Assessment:    Mr. Stein is a 52 year old male with an IDH wild-type, MGMT promoter unmethylated left parietal glioblastoma status post gross total resection. He is receiving adjuvant chemoradiotherapy +/- Enzastaurin per -79. He is tolerating treatment well with the anticipated acute radiation-induced dermatitis and alopecia.    Plan:   IDH wild-type, MGMT promoter unmethylated left parietal glioblastoma:    Complete chemoradiotherapy on 10/17/2022 however did offer him a week and treatments (10/15/2022) if he wished to finish on a Saturday    Follow-up with radiation oncology NP in 1 month for symptom assessment    Pain management:    No symptoms requiring medical management    Fluids/Electrolytes/Nutrition:    Continue diet as tolerated    Dermatitis:    Continue PRN moisturizer application to the scalp    Mosaiq chart and setup information reviewed  kVCT images reviewed    Medication list reviewed    Carmelo Hernandez MD/PhD    Dept of Radiation Oncology  [facility]

## 2022-10-13 ENCOUNTER — APPOINTMENT (OUTPATIENT)
Dept: RADIATION ONCOLOGY | Facility: CLINIC | Age: 52
End: 2022-10-13
Attending: RADIOLOGY
Payer: COMMERCIAL

## 2022-10-13 PROCEDURE — 77014 PR CT GUIDE FOR PLACEMENT RADIATION THERAPY FIELDS: CPT | Mod: 26 | Performed by: STUDENT IN AN ORGANIZED HEALTH CARE EDUCATION/TRAINING PROGRAM

## 2022-10-13 PROCEDURE — 77386 HC IMRT TREATMENT DELIVERY, COMPLEX: CPT | Performed by: RADIOLOGY

## 2022-10-14 ENCOUNTER — APPOINTMENT (OUTPATIENT)
Dept: RADIATION ONCOLOGY | Facility: CLINIC | Age: 52
End: 2022-10-14
Attending: RADIOLOGY
Payer: COMMERCIAL

## 2022-10-14 DIAGNOSIS — C71.9 GLIOBLASTOMA (H): Primary | ICD-10-CM

## 2022-10-14 PROCEDURE — 77386 HC IMRT TREATMENT DELIVERY, COMPLEX: CPT | Performed by: RADIOLOGY

## 2022-10-17 ENCOUNTER — APPOINTMENT (OUTPATIENT)
Dept: RADIATION ONCOLOGY | Facility: CLINIC | Age: 52
End: 2022-10-17
Attending: RADIOLOGY
Payer: COMMERCIAL

## 2022-10-17 PROCEDURE — 77386 HC IMRT TREATMENT DELIVERY, COMPLEX: CPT | Performed by: RADIOLOGY

## 2022-10-17 PROCEDURE — 77427 RADIATION TX MANAGEMENT X5: CPT | Performed by: RADIOLOGY

## 2022-10-17 PROCEDURE — 77014 PR CT GUIDE FOR PLACEMENT RADIATION THERAPY FIELDS: CPT | Mod: 26 | Performed by: RADIOLOGY

## 2022-10-17 NOTE — PROGRESS NOTES
"NEURO-ONCOLOGY VISIT  Oct 18, 2022    CHIEF COMPLAINT: Mr. 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 He has since completed upfront chemoradiotherapy per the Denovo clinical trial (Randomized, Double-Blind, Placebo-Controlled Phase 3 Study of Enzastaurin Added to Temozolomide During and Following Radiation Therapy in Newly Diagnosed Glioblastoma Patients Who Possess the Novel Genomic Biomarker DGM1) as of yesterday (10/17/2022).    Matt presents today for routine follow up as part of continued monitoring while on treatment per this clinical trial.    HISTORY OF PRESENT ILLNESS  -Matt is doing well today.   -Glad to be done with radiation. He reports that he tolerated the treatment well with minimal, if any, side effects. Denies any lingering nausea or constipation. Appetite remains good.  -Good energy levels. Continues to do a lot of walking for activity.  -Tingling in right foot is better.  -Word finding somewhat better, still feels like he'll occasionally struggle to choose the right word but not noticeable to those around him. No new memory or cognitive issues.   -Some alopecia and scalp irritation from radiation. He continues to apply Eucerin.  -Occasional lightheadedness especially in the mornings but this was not necessarily unusual before treatment. No dizziness, no chest pain, no shortness of breath.   -About one week ago, had an episode of tingling involving the right hip and thigh which resolved on its own. He has known sciatica and low back pain issues.   -On Keppra, no activity concerning for seizures.   -No headaches, no confusion, no vision changes, no focal weakness.       MEDICATIONS   Current Outpatient Medications   Medication Sig Dispense Refill     levETIRAcetam (KEPPRA) 1000 MG tablet Take 1 tablet (1,000 mg) by mouth 2 times daily 60 tablet 11     " prochlorperazine (COMPAZINE) 10 MG tablet Take 1 tablet (10 mg) by mouth every 6 hours as needed (Nausea/Vomiting) 30 tablet 2     study - enzastaurin vs placebo, IDS# [id number], 125 mg TABS tablet Take 2 tablets (250 mg) by mouth daily for 14 doses Within 30 minutes of completing a meal, preferably approximately the same time each day. The tablets must be swallowed whole, no crushing or breaking allowed. 28 tablet 0     study - enzastaurin vs placebo, IDS# [id number], 125 mg TABS tablet Take 2 tablets (250 mg) by mouth daily for 42 doses Within 30 minutes of completing a meal, preferably approximately the same time each day. The tablets must be swallowed whole, no crushing or breaking allowed. 84 tablet 0     DRUG ALLERGIES No Known Allergies      ONCOLOGIC HISTORY  -7/2022 PRESENTATION: Conductive aphasia, seizure activity.  -7/19/2022 MR brain imaging demonstrated a heterogeneously enhancing mixed solid and cystic mass in the left parietal lobe. Mild local mass effect and no midline shift.   -7/20/2022 SURGERY: Craniotomy with 5ALA-guided resection by Dr. Reese.   PATHOLOGY: Glioblastoma; IDH1, IDH2 wildtype. MGMT promoter unmethylated.    BRAF, PTEN, TP53 not mutated.   This infiltrating glioma shows palisading tumor necrosis and microvascular proliferation.  Post-operative MR brain imaging demonstrated gross total resection.  -8/12/2022 NEURO-ONC: Recommending upfront chemoradiotherapy on or off a clinical trial.   -8/22/2022 NEURO-ONC/ CLINICAL TRIAL: Clinically with numbness in the right leg. Screening labs. Planning MRI tomorrow.   -9/6 - 10/17/2022 CHEMORADS: 60cGy with concurrent temozolomide 160 mg daily and enzastaurin 250 mg daily vs placebo.  -9/6/22 NEURO-ONC: Started treatment per the DENOVO clinical trial.   -10/18/22 NEURO-ONC/ CHEMO: Completed upfront treatment per DENOVO clinical trial protocol. Will continue enzastaurin daily vs placebo.      PHYSICAL EXAMINATION  /83 (BP Location: Left arm, Patient  "Position: Sitting, Cuff Size: Adult Large)   Pulse 70   Temp 97.5  F (36.4  C) (Oral)   Resp 18   Wt 89.4 kg (197 lb)   BMI 26.25 kg/m     Wt Readings from Last 2 Encounters:   10/18/22 89.4 kg (197 lb)   10/12/22 89.9 kg (198 lb 4.8 oz)      Ht Readings from Last 2 Encounters:   08/12/22 1.845 m (6' 0.64\")   07/19/22 1.829 m (6')     KPS: 100    General: Well-appearing male in no acute distress.  Cardiovascular: RRR No murmurs, gallops, or rubs.   Respiratory: CTA bilaterally. No wheezes or crackles.  Gastrointestinal: BS +. Abdomen soft, non-tender. No palpable hepatosplenomegaly or masses.   Skin: No rashes, petechiae, or bruising noted on exposed skin.    Neurologic:   MENTAL STATUS:     Alert, oriented to date.   Speech fluent.    Comprehension intact to multi-step commands.   Good right-left orientation.     CRANIAL NERVES:     Pupils are equal, round.    Extraocular movements full, denies diplopia.     Visual fields full.     Symmetric facial movements.   Hearing intact.   No dysarthria.  MOTOR:    No pronation or drift.    Able to rise from a chair without use of arms.   On toe/ heel walk, equal distance from floor to heels/ toes.   SENSATION: Sensation intact to light touch throughout.  COORDINATION: Intact finger-nose with eyes open.  GAIT:  Walks without assistance.   Good speed. Normal stride length and heel strike. Normal turns. Normal arm swing.   Able to toe, heel walk. Able to tandem walk.       MEDICAL RECORDS  Personally reviewed radiation oncology and oncology notes.     LABS  Personally reviewed all available lab results; CBC, CMP, bilirubin direct, magnesium. EKG also reviewed.    IMAGING  No new imaging.    IMPRESSION  Clinic time for this high complexity visit was spent discussing in detail the nature of his cancer in light of his ongoing treatment plan per protocol.    Matt is doing very well today. He notes no new or worsening neurological complaints  Though he continues to have a " slight lag in word finding from time to time. Denies any events concerning for a seizure.     He tolerated chemoradiotherapy well with really no notable side effects. His last fraction of radiation was yesterday. Therefore, he is no longer taking concurrent temozolomide. He is also off Bactrim. I have personally reviewed the on-treatment notes from Dr. Hernandez. In addition, I have reviewed the clinic note from Scheierl, CNP at the start of treatment. Labs from today reviewed with no concerns. Per the Denovo protocol, he is to remain on enzastaurin daily vs placebo. We discussed the risks/ side effects and benefits of continuing on treatment per this protocol. Again clinically, Matt is tolerating treatment well and his labs show no excessive toxicity. Matt will continue with treatment per protocol.     Of note, Matt hayes in [place] and we have previously confirmed with the sponsor that he can transition his care to an enrolling site there. Our team and Matt have been in touch with a radiation oncologist; Dr. Jeremy Rivers at the [facility]. Research coordinator will reach out to continue to discuss logistics to ensure continuity of care. His initial consultation there is currently scheduled for 11/9/22.     Finally, I have reviewed the most recent note from Dr. Lane. Matt has found his visits with Dr. Lane beneficial.     PROBLEM LIST  Glioblastoma  Aphasia    PLAN  -CANCER-DIRECTED THERAPY-  -As above; continue treatment per protocol; A Randomized, Double-Blind, Placebo-Controlled Phase 3 Study of Enzastaurin Added to Temozolomide During and Following Radiation Therapy in Newly Diagnosed Glioblastoma Patients Who Possess the Novel Genomic Biomarker DGM1.    -Continue Enzastaurin daily vs placebo with possibly a dose increase in 2 weeks.    -Protocol requires an MRI in 3-4 weeks after completion of radiation.  -The plan has been to transition care to [facility]. However,  his mother is unfortunately currently admitted in [place] due to dehydration, electrolyte abnormalities and possible UTI. This has led to some uncertainty in his earlier plan to move to [place] in terms of the anticipated timeline. It remains to be seen how his mother recuperates and how his siblings in [place] devise her living arrangement which in turn will have a binding on his own predisposition. We are happy to continue extending him support and facilitate these transitions in whatever capacity that we can.     -STEROIDS-  -Currently off dexamethasone.    -SEIZURE MANAGEMENT-  -Given history of seizures, will continue current antiepileptic regimen of Keppra for the foreseeable future.  -On Keppra.    -Quality of life/ MOOD/ FATIGUE-  -History of anxiety surrounding diagnosis and treatment.   -Following with Dr. Lane; developed strategies for navigating discussions with friends.  -Continue to monitor mood as untreated/ undertreated depression can worsen fatigue, dysorexia, and quality of life.    Disposition is currently unclear between transitioning all cares to [place] vs. continuing here.     Patient was also seen and examined by Dr. William Kennedy, Mark/Onc Fellow.    Shirin Stone MD  Neuro-oncology

## 2022-10-18 ENCOUNTER — ONCOLOGY VISIT (OUTPATIENT)
Dept: ONCOLOGY | Facility: CLINIC | Age: 52
End: 2022-10-18
Attending: PSYCHIATRY & NEUROLOGY
Payer: COMMERCIAL

## 2022-10-18 ENCOUNTER — APPOINTMENT (OUTPATIENT)
Dept: LAB | Facility: CLINIC | Age: 52
End: 2022-10-18
Payer: COMMERCIAL

## 2022-10-18 ENCOUNTER — RESEARCH ENCOUNTER (OUTPATIENT)
Dept: ONCOLOGY | Facility: CLINIC | Age: 52
End: 2022-10-18
Payer: COMMERCIAL

## 2022-10-18 VITALS
BODY MASS INDEX: 26.25 KG/M2 | WEIGHT: 197 LBS | HEART RATE: 70 BPM | SYSTOLIC BLOOD PRESSURE: 137 MMHG | DIASTOLIC BLOOD PRESSURE: 83 MMHG | RESPIRATION RATE: 18 BRPM | TEMPERATURE: 97.5 F

## 2022-10-18 DIAGNOSIS — C71.9 GLIOBLASTOMA (H): Primary | ICD-10-CM

## 2022-10-18 LAB
ALBUMIN SERPL-MCNC: 3.5 G/DL (ref 3.4–5)
ALBUMIN UR-MCNC: NEGATIVE MG/DL
ALP SERPL-CCNC: 59 U/L (ref 40–150)
ALT SERPL W P-5'-P-CCNC: 32 U/L (ref 0–70)
ANION GAP SERPL CALCULATED.3IONS-SCNC: 4 MMOL/L (ref 3–14)
APPEARANCE UR: CLEAR
AST SERPL W P-5'-P-CCNC: 19 U/L (ref 0–45)
BASOPHILS # BLD AUTO: 0.1 10E3/UL (ref 0–0.2)
BASOPHILS NFR BLD AUTO: 1 %
BILIRUB DIRECT SERPL-MCNC: 0.1 MG/DL (ref 0–0.2)
BILIRUB SERPL-MCNC: 0.4 MG/DL (ref 0.2–1.3)
BILIRUB UR QL STRIP: NEGATIVE
BUN SERPL-MCNC: 11 MG/DL (ref 7–30)
CALCIUM SERPL-MCNC: 8.8 MG/DL (ref 8.5–10.1)
CHLORIDE BLD-SCNC: 106 MMOL/L (ref 94–109)
CO2 SERPL-SCNC: 28 MMOL/L (ref 20–32)
COLOR UR AUTO: YELLOW
CREAT SERPL-MCNC: 0.97 MG/DL (ref 0.66–1.25)
EOSINOPHIL # BLD AUTO: 0.2 10E3/UL (ref 0–0.7)
EOSINOPHIL NFR BLD AUTO: 5 %
ERYTHROCYTE [DISTWIDTH] IN BLOOD BY AUTOMATED COUNT: 13.3 % (ref 10–15)
GFR SERPL CREATININE-BSD FRML MDRD: >90 ML/MIN/1.73M2
GLUCOSE BLD-MCNC: 105 MG/DL (ref 70–99)
GLUCOSE UR STRIP-MCNC: NEGATIVE MG/DL
HCT VFR BLD AUTO: 48.7 % (ref 40–53)
HGB BLD-MCNC: 15.6 G/DL (ref 13.3–17.7)
HGB UR QL STRIP: NEGATIVE
IMM GRANULOCYTES # BLD: 0 10E3/UL
IMM GRANULOCYTES NFR BLD: 1 %
KETONES UR STRIP-MCNC: NEGATIVE MG/DL
LEUKOCYTE ESTERASE UR QL STRIP: NEGATIVE
LYMPHOCYTES # BLD AUTO: 0.8 10E3/UL (ref 0.8–5.3)
LYMPHOCYTES NFR BLD AUTO: 16 %
MAGNESIUM SERPL-MCNC: 2.4 MG/DL (ref 1.6–2.3)
MCH RBC QN AUTO: 29.2 PG (ref 26.5–33)
MCHC RBC AUTO-ENTMCNC: 32 G/DL (ref 31.5–36.5)
MCV RBC AUTO: 91 FL (ref 78–100)
MONOCYTES # BLD AUTO: 0.5 10E3/UL (ref 0–1.3)
MONOCYTES NFR BLD AUTO: 11 %
NEUTROPHILS # BLD AUTO: 3.1 10E3/UL (ref 1.6–8.3)
NEUTROPHILS NFR BLD AUTO: 66 %
NITRATE UR QL: NEGATIVE
NRBC # BLD AUTO: 0 10E3/UL
NRBC BLD AUTO-RTO: 0 /100
PH UR STRIP: 5.5 [PH] (ref 5–7)
PLATELET # BLD AUTO: 233 10E3/UL (ref 150–450)
POTASSIUM BLD-SCNC: 4 MMOL/L (ref 3.4–5.3)
PROT SERPL-MCNC: 7.1 G/DL (ref 6.8–8.8)
RBC # BLD AUTO: 5.35 10E6/UL (ref 4.4–5.9)
SODIUM SERPL-SCNC: 138 MMOL/L (ref 133–144)
SP GR UR STRIP: 1.02 (ref 1–1.03)
UROBILINOGEN UR STRIP-MCNC: NORMAL MG/DL
WBC # BLD AUTO: 4.6 10E3/UL (ref 4–11)

## 2022-10-18 PROCEDURE — 82248 BILIRUBIN DIRECT: CPT | Performed by: PSYCHIATRY & NEUROLOGY

## 2022-10-18 PROCEDURE — 85025 COMPLETE CBC W/AUTO DIFF WBC: CPT | Performed by: PSYCHIATRY & NEUROLOGY

## 2022-10-18 PROCEDURE — 36415 COLL VENOUS BLD VENIPUNCTURE: CPT | Performed by: PSYCHIATRY & NEUROLOGY

## 2022-10-18 PROCEDURE — 81003 URINALYSIS AUTO W/O SCOPE: CPT | Performed by: PSYCHIATRY & NEUROLOGY

## 2022-10-18 PROCEDURE — 80053 COMPREHEN METABOLIC PANEL: CPT | Performed by: PSYCHIATRY & NEUROLOGY

## 2022-10-18 PROCEDURE — 99215 OFFICE O/P EST HI 40 MIN: CPT | Performed by: PSYCHIATRY & NEUROLOGY

## 2022-10-18 PROCEDURE — 83735 ASSAY OF MAGNESIUM: CPT | Performed by: PSYCHIATRY & NEUROLOGY

## 2022-10-18 PROCEDURE — G0463 HOSPITAL OUTPT CLINIC VISIT: HCPCS

## 2022-10-18 ASSESSMENT — PAIN SCALES - GENERAL: PAINLEVEL: NO PAIN (0)

## 2022-10-18 NOTE — PROGRESS NOTES
"Oncology Rooming Note    October 18, 2022 9:55 AM   Laz Stein is a 52 year old male who presents for:    Chief Complaint   Patient presents with     Oncology Clinic Visit     Glioblastoma (H)     Initial Vitals: /83 (BP Location: Left arm, Patient Position: Sitting, Cuff Size: Adult Large)   Pulse 70   Temp 97.5  F (36.4  C) (Oral)   Resp 18   Wt 89.4 kg (197 lb)   BMI 26.25 kg/m   Estimated body mass index is 26.25 kg/m  as calculated from the following:    Height as of 8/12/22: 1.845 m (6' 0.64\").    Weight as of this encounter: 89.4 kg (197 lb). Body surface area is 2.14 meters squared.  No Pain (0) Comment: Data Unavailable   No LMP for male patient.  Allergies reviewed: Yes  Medications reviewed: Yes    Medications: Medication refills not needed today.  Pharmacy name entered into [email or web address]: [facility] - [place], [place] - [address]    Clinical concerns: no       Concepcion Gomez CMA              "

## 2022-10-18 NOTE — NURSING NOTE
HUMBERTOOVO CLINICAL TRIAL VISIT NOTE      Date of Visit: 10/18/2022     Study: A Randomized, Double-Blind, Placebo-Controlled Phase 3 Study of Enzastaurin Added to Temozolomide During and Following Radiation Therapy in Newly Diagnosed Glioblastoma Patients Who Possess the Novel Genomic Biomarker DGM1     [facility]   [id number]  IDS[facility] [id number]  Subject Name: Laz Stein  Subject ID: [id number]     Visit: Single-Agent Phase, Day 43     Did the study visit occur within the appropriate window allowed by the protocol? Yes     Clinical Trial Treatment History:  -7/2022 PRESENTATION: Conductive aphasia, seizure activity.  -7/19/2022 MR brain imaging demonstrated a heterogeneously enhancing mixed solid and cystic mass in the left parietal lobe. Mild local mass effect and no midline shift.   -7/20/2022 SURGERY: Craniotomy with 5ALA-guided resection by Dr. Reese.   PATHOLOGY: Glioblastoma; IDH1, IDH2 wildtype. MGMT promoter unmethylated.               BRAF, PTEN, TP53 not mutated.              This infiltrating glioma shows palisading tumor necrosis and microvascular proliferation.  Post-operative MR brain imaging demonstrated gross total resection.  -8/12/2022 NEURO-ONC: Recommending upfront chemoradiotherapy on or off a clinical trial.   -8/22/2022 NEURO-ONC/ CLINICAL TRIAL: Clinically with numbness in the right leg. Screening labs. Planning MRI tomorrow.   -9/6 - 10/17/2022 CHEMORADS: 60cGy with concurrent temozolomide 160 mg daily and enzastaurin 250 mg daily vs placebo.  -9/6/22 NEURO-ONC: Started treatment per the DENOVO clinical trial.   -9/13/2022 NEURO-ONC/CHEMO-RAD/CLINICAL TRIAL: Lab visit only.  -9/21/2022 NEURO-ONC/CHEMO-RAD/CLINICAL TRIAL: Week 3 Provider visit and labs.   -9/27/2022 NEURO-ONC/CHEMO-RAD/CLINICAL TRIAL: Lab visit only.  -10/7/2022 NEURO-ONC/CHEMO-RAD/CLINICAL TRIAL: Week 5 Provider visit and labs.  -10/12/2022 NEURO-ONC/CHEMO-RAD/CLINICAL TRIAL: Lab visit only.  -10/18/22 NEURO-ONC/  "CHEMO: Completed upfront treatment per [facility] clinical trial protocol. Will continue enzastaurin daily vs placebo.     Vitals:   Initial Vitals: /83 (BP Location: Left arm, Patient Position: Sitting, Cuff Size: Adult Large)   Pulse 70   Temp 97.5  F (36.4  C) (Oral)   Resp 18   Wt 89.4 kg (197 lb)   BMI 26.25 kg/m   Estimated body mass index is 26.25 kg/m  as calculated from the following:    Height as of 8/12/22: 1.845 m (6' 0.64\").    Weight as of this encounter: 89.4 kg (197 lb). Body surface area is 2.14 meters squared.  No Pain (0) Comment: Data Unavailable     - Hypertension: Grade 1  - All other criteria grade 0/normal.     Baseline Weight: 85.6 kg  % Weight Change from Baseline: +4.4%  EKG completed.  QTcF Interval (Fridericia's): 394 msec    Abnormal Assessment Findings Per Dr. Stone and AE Grading:     Medical History:              About one week ago, had an episode of tingling involving the right hip and thigh which resolved on its own  - Intermittent paresthesia: bilateral lower extremity: Grade 1              Mild frontal headache at times, none currently  - Intermittent headache: Grade 1              Pruritus and dermatitis of the left parietal scalp that he is treating with topical moisturizers  - Dermatitis radiation: Grade 1              Word finding somewhat better, still feels like he'll occasionally struggle to choose the right word but not noticeable to those around him  - Dysphasia: Grade 1              Occasional gas and constipation, none currently  - Constipation: Grade 1  New or worsening AEs:              Mild-moderate erythema of the left parietal scalp. No desquamation  - Alopecia: Start Grade 2    - All other criteria grade 0/normal.     I have personally interviewed Laz Stein and reviewed his medical record for adverse events and these have been recorded on the corresponding logs in Laz Stein's research file.      CONCOMITANT MEDICATIONS:   I have " personally reviewed concomitant medications and these have been recorded on the corresponding logs in Matt's research file.  See full list of medications below:     Current Outpatient Medications   Medication Sig Dispense Refill     levETIRAcetam (KEPPRA) 1000 MG tablet Take 1 tablet (1,000 mg) by mouth 2 times daily 60 tablet 11     prochlorperazine (COMPAZINE) 10 MG tablet Take 1 tablet (10 mg) by mouth every 6 hours as needed (Nausea/Vomiting) 30 tablet 2     study - enzastaurin vs placebo, IDS# 5781, 125 mg TABS tablet Take 2 tablets (250 mg) by mouth daily for 14 doses Within 30 minutes of completing a meal, preferably approximately the same time each day. The tablets must be swallowed whole, no crushing or breaking allowed. 28 tablet 0     study - enzastaurin vs placebo, IDS# [id number], 125 mg TABS tablet Take 2 tablets (250 mg) by mouth daily for 42 doses Within 30 minutes of completing a meal, preferably approximately the same time each day. The tablets must be swallowed whole, no crushing or breaking allowed. 84 tablet 0     sulfamethoxazole-trimethoprim (BACTRIM DS) 800-160 MG tablet Take 1 tablet by mouth Every Mon, Wed, Fri Morning To start with the start of radiation. 18 tablet 0     temozolomide (TEMODAR) 140 MG capsule Take 1 capsule (140 mg) by mouth daily for 42 doses with 1 other temozolomide prescription for 160 mg total Take at bedtime on an empty stomach  0     temozolomide (TEMODAR) 20 MG capsule Take 1 capsule (20 mg) by mouth daily for 42 doses with 1 other temozolomide prescription for 160 mg total Take at bedtime on an empty stomach 42 capsule 0         DRUG ALLERGIES:    No Known Allergies    LAB RESULTS:  Labs including CBC, CMP, Mag, Direct Bili, and Urinalysis were drawn. Labs were reviewed- any significant lab values were addressed and reviewed.       - All criteria grade 0/normal or abnormal but not accompanied by clinical symptoms or leading to treatment changes, additional therapies,  medical interventions, or additional testing or not scored per CTCAE criteria.     Component      Latest Ref Rng & Units 10/18/2022   Glucose      70 - 99 mg/dL 105 (H)   Magnesium      1.6 - 2.3 mg/dL 2.4 (H)     Performance Status (KPS): 100    Percentage    100 Normal, no complaints, no evidence of disease   90 Able to carry on normal activity; minor signs or symptoms of disease   80 Normal activity with effort; some signs or symptoms of disease   70 Cares for self; unable to carry on normal activity or do active work   60 Requires occasional assistance, but is able to care for most of his/her needs   50 Requires considerable assistance and frequent medical care   40 Disabled; requires special care and assistance   30 Severely disabled, hospitalization indicated. Death not imminent   20 Very sick, hospitalization necessary, active supportive treatment necessary   10 Moribund, fatal processes, progressing rapidly   0 Dead     Overall Treatment Plan for Clinical Trial:   This is a randomized controlled Phase 3 trial evaluating the effect of enzastaurin in patients with glioblastoma, where the primary endpoint is OS. Patients with newly diagnosed glioblastoma who meet all eligibility criteria will be enrolled. Randomization will occur within approximately 6 weeks after resection of the glioblastoma. Study treatment (Day 1) is recommended to begin on the same day or no later than 3 days after randomization. The 3 phases of the study are the same as in the run-in part except for patients receiving placebo in place of enzastaurin. Patients will be randomized 1:1 to Arm A: RT plus temozolomide and enzastaurin for 6 weeks, ending with the last dose of RT and Day 42 of temozolomide and enzastaurin therapy (Concurrent Phase), followed by enzastaurin alone for 21 to 35 days (Single-Agent Phase), then temozolomide for 6 cycles (or up to 12 cycles according to SOC) and enzastaurin up to a total of 24 cycles (1 cycle = 28  days) (Adjuvant Phase); or Arm B: RT plus temozolomide and placebo for 6 weeks, ending with Day 42 of temozolomide and placebo therapy (Concurrent Phase) followed by placebo alone for 21 to 35 days (Single-Agent Phase), then temozolomide for 6 cycles (or up to 12 cycles according to SOC) and placebo up to a total of 24 cycles (1 cycle = 28 days) (Adjuvant Phase).     Since temozolomide should be administered on an empty stomach or at least 2 hours after a meal and enzastaurin/placebo should be administered within 30 minutes after a meal, patients should take temozolomide on an empty stomach, after an appropriate wait (recommend ~60 minutes) eat a meal, and then take the enzastaurin/placebo dose. Alternatively, patients may take temozolomide at bedtime and enzastaurin/placebo in the morning with or within 30 minutes after breakfast.    Medication Count/IDS Note:  Drug Name: Enzastaurin/placebo  IDS#: [id number]    Number of bottles returned: 1  Lot number(s): [id number]  Number of pills returned: 44    Number of bottles dispensed: 1  Lot number(s): [id number]  Number of pills dispensed: 128    There is no drug diary for this study.  Matt returned the dosing instruction sheet and was given a new dosing instruction sheet and was instructed how to take temozolomide and enzastaurin/placebo for this next phase.    Are there any discrepancies between the amount of medication the patient was instructed to take and the amount that would be expected to be returned? No     Research RN Impression:   Matt is feeling well and glad to be finished with radiation. He had some right lower tingling about a week ago that subsided. Minor lightheadedness at times. Restlessness at times but hasn't noticed it in a while. The plan had been for him to transfer care to the [facility] in [place] while on study, but his mother is hospitalized in [place], and he is considering the possibility of staying. He will keep in contact.     Laz Stein was  given the opportunity to ask any trial related questions. The patient will be back in 2 weeks for labs. The patient knows to call with any new or worsening symptoms or concerns.      Please see provider progress note for full physical exam and other clinical information.      Mikayla Murcia, Clinical Research Coordinator, RN.  [facility], [facility]   Clinical Trials Office  Solid Tumor Unit

## 2022-10-18 NOTE — LETTER
"    10/18/2022         RE: Laz Stein  [address]  [place] [address]        Dear Colleague,    Thank you for referring your patient, Laz Stein, to the [facility]. Please see a copy of my visit note below.    NEURO-ONCOLOGY VISIT  Oct 18, 2022    CHIEF COMPLAINT: Mr. 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 He has since completed upfront chemoradiotherapy per the Denovo clinical trial (Randomized, Double-Blind, Placebo-Controlled Phase 3 Study of Enzastaurin Added to Temozolomide During and Following Radiation Therapy in Newly Diagnosed Glioblastoma Patients Who Possess the Novel Genomic Biomarker DGM1) as of yesterday (10/17/2022).    Matt presents today for routine follow up as part of continued monitoring while on treatment per this clinical trial.    HISTORY OF PRESENT ILLNESS  -Matt is doing well today.   -Glad to be done with radiation. He reports that he tolerated the treatment well with minimal, if any, side effects. Denies any lingering nausea or constipation. Appetite remains good.  -Good energy levels. Continues to do a lot of walking for activity.  -Tingling in right foot is better.  -Word finding somewhat better, still feels like he'll occasionally struggle to choose the right word but not noticeable to those around him. No new memory or cognitive issues.   -Some alopecia and scalp irritation from radiation. He continues to apply Eucerin.  -Occasional lightheadedness especially in the mornings but this was not necessarily unusual before treatment. No dizziness, no chest pain, no shortness of breath.   -About one week ago, had an episode of tingling involving the right hip and thigh which resolved on its own. He has known sciatica and low back pain issues.   -On Keppra, no activity concerning for seizures. "   -No headaches, no confusion, no vision changes, no focal weakness.       MEDICATIONS   Current Outpatient Medications   Medication Sig Dispense Refill     levETIRAcetam (KEPPRA) 1000 MG tablet Take 1 tablet (1,000 mg) by mouth 2 times daily 60 tablet 11     prochlorperazine (COMPAZINE) 10 MG tablet Take 1 tablet (10 mg) by mouth every 6 hours as needed (Nausea/Vomiting) 30 tablet 2     study - enzastaurin vs placebo, IDS# [id number], 125 mg TABS tablet Take 2 tablets (250 mg) by mouth daily for 14 doses Within 30 minutes of completing a meal, preferably approximately the same time each day. The tablets must be swallowed whole, no crushing or breaking allowed. 28 tablet 0     study - enzastaurin vs placebo, IDS# [id number], 125 mg TABS tablet Take 2 tablets (250 mg) by mouth daily for 42 doses Within 30 minutes of completing a meal, preferably approximately the same time each day. The tablets must be swallowed whole, no crushing or breaking allowed. 84 tablet 0     DRUG ALLERGIES No Known Allergies      ONCOLOGIC HISTORY  -7/2022 PRESENTATION: Conductive aphasia, seizure activity.  -7/19/2022 MR brain imaging demonstrated a heterogeneously enhancing mixed solid and cystic mass in the left parietal lobe. Mild local mass effect and no midline shift.   -7/20/2022 SURGERY: Craniotomy with 5ALA-guided resection by Dr. Reese.   PATHOLOGY: Glioblastoma; IDH1, IDH2 wildtype. MGMT promoter unmethylated.    BRAF, PTEN, TP53 not mutated.   This infiltrating glioma shows palisading tumor necrosis and microvascular proliferation.  Post-operative MR brain imaging demonstrated gross total resection.  -8/12/2022 NEURO-ONC: Recommending upfront chemoradiotherapy on or off a clinical trial.   -8/22/2022 NEURO-ONC/ CLINICAL TRIAL: Clinically with numbness in the right leg. Screening labs. Planning MRI tomorrow.   -9/6 - 10/17/2022 CHEMORADS: 60cGy with concurrent temozolomide 160 mg daily and enzastaurin 250 mg daily vs placebo.  -9/6/22  "NEURO-ONC: Started treatment per the DENOVO clinical trial.   -10/18/22 NEURO-ONC/ CHEMO: Completed upfront treatment per DENOVO clinical trial protocol. Will continue enzastaurin daily vs placebo.      PHYSICAL EXAMINATION  /83 (BP Location: Left arm, Patient Position: Sitting, Cuff Size: Adult Large)   Pulse 70   Temp 97.5  F (36.4  C) (Oral)   Resp 18   Wt 89.4 kg (197 lb)   BMI 26.25 kg/m     Wt Readings from Last 2 Encounters:   10/18/22 89.4 kg (197 lb)   10/12/22 89.9 kg (198 lb 4.8 oz)      Ht Readings from Last 2 Encounters:   08/12/22 1.845 m (6' 0.64\")   07/19/22 1.829 m (6')     KPS: 100    General: Well-appearing male in no acute distress.  Cardiovascular: RRR No murmurs, gallops, or rubs.   Respiratory: CTA bilaterally. No wheezes or crackles.  Gastrointestinal: BS +. Abdomen soft, non-tender. No palpable hepatosplenomegaly or masses.   Skin: No rashes, petechiae, or bruising noted on exposed skin.    Neurologic:   MENTAL STATUS:     Alert, oriented to date.   Speech fluent.    Comprehension intact to multi-step commands.   Good right-left orientation.     CRANIAL NERVES:     Pupils are equal, round.    Extraocular movements full, denies diplopia.     Visual fields full.     Symmetric facial movements.   Hearing intact.   No dysarthria.  MOTOR:    No pronation or drift.    Able to rise from a chair without use of arms.   On toe/ heel walk, equal distance from floor to heels/ toes.   SENSATION: Sensation intact to light touch throughout.  COORDINATION: Intact finger-nose with eyes open.  GAIT:  Walks without assistance.   Good speed. Normal stride length and heel strike. Normal turns. Normal arm swing.   Able to toe, heel walk. Able to tandem walk.       MEDICAL RECORDS  Personally reviewed radiation oncology and oncology notes.     LABS  Personally reviewed all available lab results; CBC, CMP, bilirubin direct, magnesium. EKG also reviewed.    IMAGING  No new imaging.    IMPRESSION  Clinic " time for this high complexity visit was spent discussing in detail the nature of his cancer in light of his ongoing treatment plan per protocol.    Matt is doing very well today. He notes no new or worsening neurological complaints  Though he continues to have a slight lag in word finding from time to time. Denies any events concerning for a seizure.     He tolerated chemoradiotherapy well with really no notable side effects. His last fraction of radiation was yesterday. Therefore, he is no longer taking concurrent temozolomide. He is also off Bactrim. I have personally reviewed the on-treatment notes from Dr. Hernandez. In addition, I have reviewed the clinic note from Scheierl, CNP at the start of treatment. Labs from today reviewed with no concerns. Per the Denovo protocol, he is to remain on enzastaurin daily vs placebo. We discussed the risks/ side effects and benefits of continuing on treatment per this protocol. Again clinically, Matt is tolerating treatment well and his labs show no excessive toxicity. Matt will continue with treatment per protocol.     Of note, Matt hayes in [place] and we have previously confirmed with the sponsor that he can transition his care to an enrolling site there. Our team and Matt have been in touch with a radiation oncologist; Dr. Jeremy Rivers at the [facility]. Research coordinator will reach out to continue to discuss logistics to ensure continuity of care. His initial consultation there is currently scheduled for 11/9/22.     Finally, I have reviewed the most recent note from Dr. Lane. Matt has found his visits with Dr. Lane beneficial.     PROBLEM LIST  Glioblastoma  Aphasia    PLAN  -CANCER-DIRECTED THERAPY-  -As above; continue treatment per protocol; A Randomized, Double-Blind, Placebo-Controlled Phase 3 Study of Enzastaurin Added to Temozolomide During and Following Radiation Therapy in Newly Diagnosed Glioblastoma Patients Who Possess the  Novel Genomic Biomarker DGM1.    -Continue Enzastaurin daily vs placebo with possibly a dose increase in 2 weeks.    -Protocol requires an MRI in 3-4 weeks after completion of radiation.  -The plan has been to transition care to [facility]. However, his mother is unfortunately currently admitted in [place] due to dehydration, electrolyte abnormalities and possible UTI. This has led to some uncertainty in his earlier plan to move to [place] in terms of the anticipated timeline. It remains to be seen how his mother recuperates and how his siblings in [place] devise her living arrangement which in turn will have a binding on his own predisposition. We are happy to continue extending him support and facilitate these transitions in whatever capacity that we can.     -STEROIDS-  -Currently off dexamethasone.    -SEIZURE MANAGEMENT-  -Given history of seizures, will continue current antiepileptic regimen of Keppra for the foreseeable future.  -On Keppra.    -Quality of life/ MOOD/ FATIGUE-  -History of anxiety surrounding diagnosis and treatment.   -Following with Dr. Lane; developed strategies for navigating discussions with friends.  -Continue to monitor mood as untreated/ undertreated depression can worsen fatigue, dysorexia, and quality of life.    Disposition is currently unclear between transitioning all cares to [place] vs. continuing here.     Patient was also seen and examined by Dr. William Kennedy, Heme/Onc Fellow.    Shirin Stone MD  Neuro-oncology           Oncology Rooming Note    October 18, 2022 9:55 AM   Laz Stein is a 52 year old male who presents for:    Chief Complaint   Patient presents with     Oncology Clinic Visit     Glioblastoma (H)     Initial Vitals: /83 (BP Location: Left arm, Patient Position: Sitting, Cuff Size: Adult Large)   Pulse 70   Temp 97.5  F (36.4  C) (Oral)   Resp 18   Wt 89.4 kg (197 lb)   BMI 26.25 kg/m   Estimated body mass index is 26.25 kg/m  as  "calculated from the following:    Height as of 8/12/22: 1.845 m (6' 0.64\").    Weight as of this encounter: 89.4 kg (197 lb). Body surface area is 2.14 meters squared.  No Pain (0) Comment: Data Unavailable   No LMP for male patient.  Allergies reviewed: Yes  Medications reviewed: Yes    Medications: Medication refills not needed today.  Pharmacy name entered into [employer]: [facility] - [place], [place] - [address]    Clinical concerns: no       Concepcion Gomez CMA                  Again, thank you for allowing me to participate in the care of your patient.        Sincerely,        Shirin Stone MD    "

## 2022-10-20 DIAGNOSIS — C71.9 GLIOBLASTOMA (H): ICD-10-CM

## 2022-10-20 DIAGNOSIS — C71.9 GLIOBLASTOMA (H): Primary | ICD-10-CM

## 2022-10-20 DIAGNOSIS — Z00.6 EXAMINATION OF PARTICIPANT OR CONTROL IN CLINICAL RESEARCH: ICD-10-CM

## 2022-10-20 RX ORDER — LEVETIRACETAM 1000 MG/1
1000 TABLET ORAL 2 TIMES DAILY
Qty: 60 TABLET | Refills: 11 | Status: SHIPPED | OUTPATIENT
Start: 2022-10-20

## 2022-10-20 RX ORDER — PROCHLORPERAZINE MALEATE 10 MG
10 TABLET ORAL EVERY 6 HOURS PRN
Qty: 30 TABLET | Refills: 2 | Status: SHIPPED | OUTPATIENT
Start: 2022-10-20 | End: 2023-02-07

## 2022-10-22 ENCOUNTER — NURSE TRIAGE (OUTPATIENT)
Dept: NURSING | Facility: CLINIC | Age: 52
End: 2022-10-22

## 2022-10-22 NOTE — TELEPHONE ENCOUNTER
Nurse Triage SBAR    Is this a 2nd Level Triage? YES, LICENSED PRACTITIONER REVIEW IS REQUIRED    Situation: Pt calling with question for oncologist.    Background: Says his mother was recently diagnosed with pneumonia and he has been helping to care for her. He completed radiation on Monday and was taken off of his prophylactic antibiotics. Wondering if he needs to restart antibiotics due to the exposure and potential for getting sick.    Assessment: Pt denies any symptoms currently.    Protocol Recommended Disposition:   No disposition on file.    Recommendation: Will page on call provider for recommendation.     Paged to provider    Does the patient meet one of the following criteria for ADS visit consideration? 16+ years old, with an [facility] PCP     TIP  Providers, please consider if this condition is appropriate for management at one of our Acute and Diagnostic Services sites.     If patient is a good candidate, please use dotphrase <dot>triageresponse and select Refer to ADS to document.    Provider Recommendation Follow Up:     Spoke with Dr. Orosco who recommends that pt monitor for symptoms. If feeling ok and asymptomatic, no need for antibiotics. If he starts to develop symptoms such as cough, fever, chills should go to [facility]/ED or PCP for evaluation.  Reached patient/caregiver. Informed of provider's recommendations. Patient verbalized understanding and agrees with the plan.         Annabel Morales, RN, BSN  [facility]   Triage Nurse Advisor    Additional Information    Negative: Nursing judgment    Nursing judgment    Protocols used: NO PROTOCOL AVAILABLE - INFORMATION ONLY-A-[place]

## 2022-10-25 ENCOUNTER — ONCOLOGY VISIT (OUTPATIENT)
Dept: RADIATION ONCOLOGY | Facility: CLINIC | Age: 52
End: 2022-10-25

## 2022-10-25 NOTE — LETTER
10/25/2022      RE: Laz Stein  [address]  [place] [address]       No notes on file    Carmelo Hernandez MD

## 2022-10-25 NOTE — TELEPHONE ENCOUNTER
Writer called pt to follow up and assess for any possible signs of infection.    Pt stated that he has been feeling fine and has not been experiencing any symptoms. His mother is recovering from pneumonia, and he has been wearing a mask and practicing frequent handwashing when helping care for her.    Writer advised pt to call the clinic if he should begin to experience any symptoms. Pt verbalized understanding.

## 2022-10-25 NOTE — LETTER
10/25/2022         RE: Laz Stein  [address]  [facility] [address]        Dear Colleague,    Thank you for referring your patient, Laz Stein, to the [facility] RADIATION ONCOLOGY. Please see a copy of my visit note below.    No notes on file    Again, thank you for allowing me to participate in the care of your patient.        Sincerely,        Carmelo Hernandez MD

## 2022-11-01 ENCOUNTER — LAB (OUTPATIENT)
Dept: LAB | Facility: CLINIC | Age: 52
End: 2022-11-01
Attending: PSYCHIATRY & NEUROLOGY
Payer: COMMERCIAL

## 2022-11-01 ENCOUNTER — OFFICE VISIT (OUTPATIENT)
Dept: RADIATION ONCOLOGY | Facility: CLINIC | Age: 52
End: 2022-11-01
Attending: RADIOLOGY
Payer: COMMERCIAL

## 2022-11-01 ENCOUNTER — RESEARCH ENCOUNTER (OUTPATIENT)
Dept: ONCOLOGY | Facility: CLINIC | Age: 52
End: 2022-11-01
Payer: COMMERCIAL

## 2022-11-01 DIAGNOSIS — C71.9 GLIOBLASTOMA (H): Primary | ICD-10-CM

## 2022-11-01 DIAGNOSIS — C71.9 GLIOBLASTOMA (H): ICD-10-CM

## 2022-11-01 LAB
ALBUMIN SERPL BCG-MCNC: 4.3 G/DL (ref 3.5–5.2)
ALP SERPL-CCNC: 62 U/L (ref 40–129)
ALT SERPL W P-5'-P-CCNC: 13 U/L (ref 10–50)
ANION GAP SERPL CALCULATED.3IONS-SCNC: 10 MMOL/L (ref 7–15)
AST SERPL W P-5'-P-CCNC: 20 U/L (ref 10–50)
BASOPHILS # BLD AUTO: 0.1 10E3/UL (ref 0–0.2)
BASOPHILS NFR BLD AUTO: 1 %
BILIRUB DIRECT SERPL-MCNC: <0.2 MG/DL (ref 0–0.3)
BILIRUB SERPL-MCNC: 0.4 MG/DL
BUN SERPL-MCNC: 17 MG/DL (ref 6–20)
CALCIUM SERPL-MCNC: 9.3 MG/DL (ref 8.6–10)
CHLORIDE SERPL-SCNC: 106 MMOL/L (ref 98–107)
CREAT SERPL-MCNC: 0.93 MG/DL (ref 0.67–1.17)
DEPRECATED HCO3 PLAS-SCNC: 24 MMOL/L (ref 22–29)
EOSINOPHIL # BLD AUTO: 0.1 10E3/UL (ref 0–0.7)
EOSINOPHIL NFR BLD AUTO: 1 %
ERYTHROCYTE [DISTWIDTH] IN BLOOD BY AUTOMATED COUNT: 13.5 % (ref 10–15)
GFR SERPL CREATININE-BSD FRML MDRD: >90 ML/MIN/1.73M2
GLUCOSE SERPL-MCNC: 106 MG/DL (ref 70–99)
HCT VFR BLD AUTO: 45.4 % (ref 40–53)
HGB BLD-MCNC: 15.1 G/DL (ref 13.3–17.7)
IMM GRANULOCYTES # BLD: 0 10E3/UL
IMM GRANULOCYTES NFR BLD: 1 %
LYMPHOCYTES # BLD AUTO: 0.8 10E3/UL (ref 0.8–5.3)
LYMPHOCYTES NFR BLD AUTO: 11 %
MAGNESIUM SERPL-MCNC: 2.1 MG/DL (ref 1.7–2.3)
MCH RBC QN AUTO: 29.3 PG (ref 26.5–33)
MCHC RBC AUTO-ENTMCNC: 33.3 G/DL (ref 31.5–36.5)
MCV RBC AUTO: 88 FL (ref 78–100)
MONOCYTES # BLD AUTO: 0.6 10E3/UL (ref 0–1.3)
MONOCYTES NFR BLD AUTO: 9 %
NEUTROPHILS # BLD AUTO: 5.4 10E3/UL (ref 1.6–8.3)
NEUTROPHILS NFR BLD AUTO: 77 %
NRBC # BLD AUTO: 0 10E3/UL
NRBC BLD AUTO-RTO: 0 /100
PLATELET # BLD AUTO: 247 10E3/UL (ref 150–450)
POTASSIUM SERPL-SCNC: 4.2 MMOL/L (ref 3.4–5.3)
PROT SERPL-MCNC: 7.1 G/DL (ref 6.4–8.3)
RBC # BLD AUTO: 5.16 10E6/UL (ref 4.4–5.9)
SODIUM SERPL-SCNC: 140 MMOL/L (ref 136–145)
WBC # BLD AUTO: 6.9 10E3/UL (ref 4–11)

## 2022-11-01 PROCEDURE — 80053 COMPREHEN METABOLIC PANEL: CPT | Performed by: PSYCHIATRY & NEUROLOGY

## 2022-11-01 PROCEDURE — 85025 COMPLETE CBC W/AUTO DIFF WBC: CPT | Performed by: PSYCHIATRY & NEUROLOGY

## 2022-11-01 PROCEDURE — 36415 COLL VENOUS BLD VENIPUNCTURE: CPT | Performed by: PSYCHIATRY & NEUROLOGY

## 2022-11-01 PROCEDURE — 82248 BILIRUBIN DIRECT: CPT | Performed by: PSYCHIATRY & NEUROLOGY

## 2022-11-01 PROCEDURE — 99213 OFFICE O/P EST LOW 20 MIN: CPT | Performed by: NURSE PRACTITIONER

## 2022-11-01 PROCEDURE — 83735 ASSAY OF MAGNESIUM: CPT | Performed by: PSYCHIATRY & NEUROLOGY

## 2022-11-01 NOTE — NURSING NOTE
Chief Complaint   Patient presents with     Blood Draw     Labs drawn by RN via .     Labs collected from venipuncture by RN.     Shey Moreno RN

## 2022-11-01 NOTE — LETTER
2022         RE: Laz Stein  [address]  [place] [address]        Dear Colleague,    Thank you for referring your patient, Laz Stein, to the [facility] RADIATION ONCOLOGY. Please see a copy of my visit note below.    Radiation Oncology Follow-up Visit  2022    Laz Stein  MRN: [id number]   : 1970     DISEASE TREATED:   Glioblastoma, IDH wild type, MGMT promoter unethylated    RADIATION THERAPY DELIVERED:   6,000 cGy completed 10/17/2022    INTERVAL SINCE COMPLETION OF RADIATION THERAPY:   2 weeks    SUBJECTIVE:   Laz Stein is a 52 year old male who is here today for routine 2 week follow up after completing radiation therapy. He is doing very well.  He denies any headaches or neurological symptoms.  He has only slight fatigue that he notices in the evening.  He is on study and also having routine blood work done today.  He does notice hair loss.  He continues to moisturize.      ROS:  Complete review of systems is negative except for symptoms discussed in subjective above.    Current Outpatient Medications   Medication     levETIRAcetam (KEPPRA) 1000 MG tablet     prochlorperazine (COMPAZINE) 10 MG tablet     study - enzastaurin vs placebo, IDS# 5781, 125 mg TABS tablet     study - enzastaurin vs placebo, IDS# [id number], 125 mg TABS tablet     No current facility-administered medications for this visit.        No Known Allergies    Past Medical History:   Diagnosis Date     Glioblastoma (H)      HTN (hypertension)          PHYSICAL EXAM:  There were no vitals taken for this visit.  Gen: Alert, in NAD  Neurologically intact throughout.  No deficits noted.  Skin: Normal color and turgor  Psychiatric: Appropriate mood and affect      LABS AND IMAGING:  Reviewed.    IMPRESSION:   Mr. Stein is a 52 year old male with glioblastoma s/p chemoradiation on study and now 2 weeks out from treatment and doing great.    PLAN:   Patient has  recovered nicely from acute side effects of radiation therapy.   Follow up with Dr. Stone 11/15/2022 with MRI prior.    Helen Dupree NP  Radiation Oncology  [facility]

## 2022-11-01 NOTE — PROCEDURES
Radiotherapy Treatment Summary          Date of Report: 2022     PATIENT: RAFITA BOBBY  MEDICAL RECORD NO: [id number]  : 1970     DIAGNOSIS: C71.3 Malignant neoplasm of parietal lobe  INTENT OF RADIOTHERAPY: Curative (adjuvant)  PATHOLOGY: Glioblastoma, IDH wild-type, MGMT promoter unmethylated  STAGE: N/A  CONCURRENT SYSTEMIC THERAPY: Temozolomide     Details of the treatments summarized below are found in records kept in the Department of Radiation Oncology at [facility].     Treatment Summary:  Treatment Site  Dose  Modality From  To  Days Fx.  L parietal    4,600 cGy 06 X   9/06/2022 10/06/2022  30 23  L parietal boost 1,400 cGy 06 X  10/07/2022 10/17/2022  10  7  Cumulative   6,000 cGy 06 X  9/06/2022 10/17/2022  41 30          Dose per Fraction:   200 cGy     COMMENTS:  Mr. Bobby is a 52-year-old male who initially presented to the ED in 2022 with word finding difficulties and was found on MRI with a 4.3 cm enhancing heterogeneous mass within the left parietal lobe. He underwent left craniotomy and 5 ALA guided tumor resection on 22. Surgical pathology confirmed a diagnosis of IDH wild-type, MGMT promoter unmethylated WHO grade 4 glioblastoma. Post-operative MRI demonstrated gross total resection. He was enrolled on the Denovo trial with adjuvant chemoradiation with temozolomide +/- enzastaurin.     The left parietal parenchyma surrounding the resection bed was treated to 46 Gy in 23 once-daily fractions using 6 MV photon beam via tomotherapy technique. The left parietal resection bed was then sequentially boosted to another 14 Gy in 7 fractions to a total dose of 60 Gy.      Mr. Bobby developed grade 2 dermatitis and alopecia by the end of his therapy. His dermatitis was treated with as needed moisturizer application to the scalp.     Mr. Bobby had a 2-day break between fractions 16-17 secondary to machine downtime. The missed fractions were made up over the following  weekend.      Acute Toxicity Profile (CTCAE v5.0)  Alopecia: Grade 2   Dermatitis: Grade 2     PAIN MANAGEMENT:   No symptoms requiring medical management     FOLLOW UP PLAN:   Follow-up with radiation oncology NP in 1 month for symptom assessment     Resident Physician: Dede Rae MD  Staff Physician: Carmelo Hernandez MD, PhD     CC:   Shirin Reese MD, PhD  Lakeshia Murcia RN                                 Radiation Oncology:  [facility] [address] [place] [address]

## 2022-11-01 NOTE — PROGRESS NOTES
Radiation Oncology Follow-up Visit  2022    Laz Stein  MRN: [id number]   : 1970     DISEASE TREATED:   Glioblastoma, IDH wild type, MGMT promoter unethylated    RADIATION THERAPY DELIVERED:   6,000 cGy completed 10/17/2022    INTERVAL SINCE COMPLETION OF RADIATION THERAPY:   2 weeks    SUBJECTIVE:   Laz Stein is a 52 year old male who is here today for routine 2 week follow up after completing radiation therapy. He is doing very well.  He denies any headaches or neurological symptoms.  He has only slight fatigue that he notices in the evening.  He is on study and also having routine blood work done today.  He does notice hair loss.  He continues to moisturize.      ROS:  Complete review of systems is negative except for symptoms discussed in subjective above.    Current Outpatient Medications   Medication     levETIRAcetam (KEPPRA) 1000 MG tablet     prochlorperazine (COMPAZINE) 10 MG tablet     study - enzastaurin vs placebo, IDS# [id number], 125 mg TABS tablet     study - enzastaurin vs placebo, IDS# [id number], 125 mg TABS tablet     No current facility-administered medications for this visit.        No Known Allergies    Past Medical History:   Diagnosis Date     Glioblastoma (H)      HTN (hypertension)          PHYSICAL EXAM:  There were no vitals taken for this visit.  Gen: Alert, in NAD  Neurologically intact throughout.  No deficits noted.  Skin: Normal color and turgor  Psychiatric: Appropriate mood and affect      LABS AND IMAGING:  Reviewed.    IMPRESSION:   Mr. Stein is a 52 year old male with glioblastoma s/p chemoradiation on study and now 2 weeks out from treatment and doing great.    PLAN:   Patient has recovered nicely from acute side effects of radiation therapy.   Follow up with Dr. Stone 11/15/2022 with MRI prior.    Helen Dupree NP  Radiation Oncology  [facility]

## 2022-11-02 NOTE — NURSING NOTE
[facility] CLINICAL TRIAL VISIT NOTE      Date of Visit: 11/1/2022     Study: A Randomized, Double-Blind, Placebo-Controlled Phase 3 Study of Enzastaurin Added to Temozolomide During and Following Radiation Therapy in Newly Diagnosed Glioblastoma Patients Who Possess the Novel Genomic Biomarker DGM1     [facility]#: 2020   [facility]#: [id number]  IDS#: [id number]  Subject Name: Laz Stein  Subject ID: [id number]     Visit: Single-Agent Phase, Day 57     Did the study visit occur within the appropriate window allowed by the protocol? Yes     Clinical Trial Treatment History:  -7/2022 PRESENTATION: Conductive aphasia, seizure activity.  -7/19/2022 MR brain imaging demonstrated a heterogeneously enhancing mixed solid and cystic mass in the left parietal lobe. Mild local mass effect and no midline shift.   -7/20/2022 SURGERY: Craniotomy with 5ALA-guided resection by Dr. Reese.   PATHOLOGY: Glioblastoma; IDH1, IDH2 wildtype. MGMT promoter unmethylated.               BRAF, PTEN, TP53 not mutated.              This infiltrating glioma shows palisading tumor necrosis and microvascular proliferation.  Post-operative MR brain imaging demonstrated gross total resection.  -8/12/2022 NEURO-ONC: Recommending upfront chemoradiotherapy on or off a clinical trial.   -8/22/2022 NEURO-ONC/ CLINICAL TRIAL: Clinically with numbness in the right leg. Screening labs. Planning MRI tomorrow.   -9/6 - 10/17/2022 CHEMORADS: 60cGy with concurrent temozolomide 160 mg daily and enzastaurin 250 mg daily vs placebo.  -9/6/22 NEURO-ONC: Started treatment per the [facility]OVO clinical trial.   -9/13/2022 NEURO-ONC/CHEMO-RAD/CLINICAL TRIAL: Lab visit only.  -9/21/2022 NEURO-ONC/CHEMO-RAD/CLINICAL TRIAL: Week 3 Provider visit and labs.   -9/27/2022 NEURO-ONC/CHEMO-RAD/CLINICAL TRIAL: Lab visit only.  -10/7/2022 NEURO-ONC/CHEMO-RAD/CLINICAL TRIAL: Week 5 Provider visit and labs.  -10/12/2022 NEURO-ONC/CHEMO-RAD/CLINICAL TRIAL: Lab visit only.  -10/18/22 NEURO-ONC/CHEMO:  Completed upfront treatment per [facility] clinical trial protocol. Will continue enzastaurin daily vs placebo.  -11/1/2022 NEURO-ONC/CLINICAL TRIAL: Lab visit only. Increase enzastaurin/placebo to 500 mg today.     Vitals:   There were no vitals taken for this visit.       CONCOMITANT MEDICATIONS:   I have personally reviewed concomitant medications and these have been recorded on the corresponding logs in Matt's research file.  See full list of medications below:     Current Outpatient Medications   Medication Sig Dispense Refill     levETIRAcetam (KEPPRA) 1000 MG tablet Take 1 tablet (1,000 mg) by mouth 2 times daily 60 tablet 11     prochlorperazine (COMPAZINE) 10 MG tablet Take 1 tablet (10 mg) by mouth every 6 hours as needed (Nausea/Vomiting) 30 tablet 2     study - enzastaurin vs placebo, IDS# [id number], 125 mg TABS tablet Take 4 tablets (500 mg) by mouth daily for 7 doses Within 30 minutes of completing a meal, preferably approximately the same time each day. The tablets must be swallowed whole, no crushing or breaking allowed. 28 tablet 0         DRUG ALLERGIES:    No Known Allergies    LAB RESULTS:  Labs including CBC, CMP, Magnesium, and Direct Bilirubin were drawn. Labs were reviewed- any significant lab values were addressed and reviewed.       - All criteria grade 0/normal or abnormal but not accompanied by clinical symptoms or leading to treatment changes, additional therapies, medical interventions, or additional testing or not scored per CTCAE criteria.     Component      Latest Ref Rng & Units 11/1/2022   Glucose      70 - 99 mg/dL 106 (H)     Overall Treatment Plan for Clinical Trial:   This is a randomized controlled Phase 3 trial evaluating the effect of enzastaurin in patients with glioblastoma, where the primary endpoint is OS. Patients with newly diagnosed glioblastoma who meet all eligibility criteria will be enrolled. Randomization will occur within approximately 6 weeks after resection of the  glioblastoma. Study treatment (Day 1) is recommended to begin on the same day or no later than 3 days after randomization. The 3 phases of the study are the same as in the run-in part except for patients receiving placebo in place of enzastaurin. Patients will be randomized 1:1 to Arm A: RT plus temozolomide and enzastaurin for 6 weeks, ending with the last dose of RT and Day 42 of temozolomide and enzastaurin therapy (Concurrent Phase), followed by enzastaurin alone for 21 to 35 days (Single-Agent Phase), then temozolomide for 6 cycles (or up to 12 cycles according to SOC) and enzastaurin up to a total of 24 cycles (1 cycle = 28 days) (Adjuvant Phase); or Arm B: RT plus temozolomide and placebo for 6 weeks, ending with Day 42 of temozolomide and placebo therapy (Concurrent Phase) followed by placebo alone for 21 to 35 days (Single-Agent Phase), then temozolomide for 6 cycles (or up to 12 cycles according to SOC) and placebo up to a total of 24 cycles (1 cycle = 28 days) (Adjuvant Phase).     Since temozolomide should be administered on an empty stomach or at least 2 hours after a meal and enzastaurin/placebo should be administered within 30 minutes after a meal, patients should take temozolomide on an empty stomach, after an appropriate wait (recommend ~60 minutes) eat a meal, and then take the enzastaurin/placebo dose. Alternatively, patients may take temozolomide at bedtime and enzastaurin/placebo in the morning with or within 30 minutes after breakfast.    Research RN Impression:   Matt was given another new dosing instruction sheet so that he knows to start taking 500 mg today and daily going forward. He said he is flying home and will take it then with food.      Laz Stein was given the opportunity to ask any trial related questions. The patient will be back for daily radiation. The patient knows to call with any new or worsening symptoms or concerns.      Please see provider progress note for full  physical exam and other clinical information.      Mikayla Murcia, Clinical Research Coordinator, RN.  [facility], [facility]   Clinical Trials Office  Solid Tumor Unit

## 2022-11-03 DIAGNOSIS — C71.9 GLIOBLASTOMA (H): ICD-10-CM

## 2022-11-03 DIAGNOSIS — Z00.6 EXAMINATION OF PARTICIPANT OR CONTROL IN CLINICAL RESEARCH: ICD-10-CM

## 2022-11-03 DIAGNOSIS — D48.9: Primary | ICD-10-CM

## 2022-11-03 RX ORDER — TEMOZOLOMIDE 100 MG/1
150 CAPSULE ORAL DAILY
Qty: 15 CAPSULE | Refills: 0 | Status: CANCELLED | OUTPATIENT
Start: 2022-11-08 | End: 2022-11-13

## 2022-11-10 DIAGNOSIS — C71.9 GLIOBLASTOMA (H): Primary | ICD-10-CM

## 2022-11-10 RX ORDER — TEMOZOLOMIDE 100 MG/1
150 CAPSULE ORAL DAILY
Qty: 15 CAPSULE | Refills: 0 | Status: ON HOLD | OUTPATIENT
Start: 2022-11-10 | End: 2023-04-20

## 2022-11-14 DIAGNOSIS — C71.9 GLIOBLASTOMA (H): Primary | ICD-10-CM

## 2022-11-14 NOTE — PROGRESS NOTES
"NEURO-ONCOLOGY VISIT  Nov 15, 2022    CHIEF COMPLAINT: Mr. 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 He has since completed upfront chemoradiotherapy per the [facility] clinical trial (Randomized, Double-Blind, Placebo-Controlled Phase 3 Study of Enzastaurin Added to Temozolomide During and Following Radiation Therapy in Newly Diagnosed Glioblastoma Patients Who Possess the Novel Genomic Biomarker DGM1) as of 10/17/2022.    Matt presents today for follow up.    HISTORY OF PRESENT ILLNESS  -Matt is doing well today.   -Enjoyed the break from chemoradiotherapy.  -Good energy levels. Trying to remain active.   -Infrequent headaches; mainly triggered by stress and fatigue.   -Denies any changes in sensation. No weakness. No vision changes.  -Word finding is again better. No new memory or cognitive issues.   -On Keppra, no activity concerning for seizures.       MEDICATIONS   Current Outpatient Medications   Medication Sig Dispense Refill     levETIRAcetam (KEPPRA) 1000 MG tablet Take 1 tablet (1,000 mg) by mouth 2 times daily 60 tablet 11     prochlorperazine (COMPAZINE) 10 MG tablet Take 1 tablet (10 mg) by mouth every 6 hours as needed (Nausea/Vomiting) 30 tablet 2     study - enzastaurin vs placebo, IDS# [id number], 125 mg TABS tablet Take 4 tablets (500 mg) by mouth daily Within 30 minutes of completing a meal, preferably approximately the same time each day. The tablets must be swallowed whole, no crushing or breaking allowed. 112 tablet 0     temozolomide (TEMODAR) 100 MG capsule Take 3 capsules (300 mg) by mouth daily for 5 doses Day 1 thru 5 of each cycle. Take at bedtime on an empty stomach. 15 capsule 0     DRUG ALLERGIES No Known Allergies      ONCOLOGIC HISTORY  -7/2022 PRESENTATION: Conductive aphasia, seizure activity.  -7/19/2022 MR brain imaging demonstrated a " "heterogeneously enhancing mixed solid and cystic mass in the left parietal lobe. Mild local mass effect and no midline shift.   -7/20/2022 SURGERY: Craniotomy with 5ALA-guided resection by Dr. Reese.   PATHOLOGY: Glioblastoma; IDH1, IDH2 wildtype. MGMT promoter unmethylated.    BRAF, PTEN, TP53 not mutated.   This infiltrating glioma shows palisading tumor necrosis and microvascular proliferation.  Post-operative MR brain imaging demonstrated gross total resection.  -8/12/2022 NEURO-ONC: Recommending upfront chemoradiotherapy on or off a clinical trial.   -8/22/2022 NEURO-ONC/ CLINICAL TRIAL: Clinically with numbness in the right leg. Screening labs. Planning MRI tomorrow.   -9/6 - 10/17/2022 CHEMORADS: 60cGy with concurrent temozolomide 160 mg daily and enzastaurin 250 mg daily vs placebo.  -9/6/22 NEURO-ONC: Started treatment per the DENOVO clinical trial.   -10/18/22 NEURO-ONC/ CHEMO: Completed upfront treatment per DENOVO clinical trial protocol. Will continue enzastaurin daily vs placebo.  -11/15/2022 NEURO-ONC/ MRB/ CLINICAL TRAIL: Clinically well. Imaging with no concerns. Starting adjuvant temozolomide 150mg/m2 (300mg) +/- Enzastaurin 500mg daily, cycle 1 (start date 11/16).       PHYSICAL EXAMINATION  BP (!) 162/88 (BP Location: Right arm, Patient Position: Sitting, Cuff Size: Adult Large)   Pulse 69   Temp 97.6  F (36.4  C) (Oral)   Resp 20   Ht 1.82 m (5' 11.65\")   Wt 89.4 kg (197 lb)   SpO2 99%   BMI 26.98 kg/m     Wt Readings from Last 2 Encounters:   11/15/22 89.4 kg (197 lb)   11/15/22 89.4 kg (197 lb)      Ht Readings from Last 2 Encounters:   11/15/22 1.82 m (5' 11.65\")   11/15/22 1.82 m (5' 11.65\")     Physical Exam Completed by Brunilda Escamilla PA-C on behalf of Dr. Stone who is seeing the patient virtually today.    General: Well-appearing male in no acute distress.  Cardiovascular: RRR No murmurs, gallops, or rubs.   Respiratory: CTA bilaterally. No wheezes or crackles.  Gastrointestinal: BS +. " Abdomen soft, non-tender. No palpable hepatosplenomegaly or masses.   Skin: No rashes, petechiae, or bruising noted on exposed skin.    Neurologic:   MENTAL STATUS:     Alert, oriented to date.   Speech fluent.    Comprehension intact to multi-step commands.   Good right-left orientation.     Recalled 3/3 words but in different order.   CRANIAL NERVES:     Pupils are equal, round.    Extraocular movements full, denies diplopia.     Visual fields full.     Symmetric facial movements.   Hearing intact.   No dysarthria.  MOTOR:    No pronation or drift.    Able to rise from a chair without use of arms.   On toe/ heel walk, equal distance from floor to heels/ toes.   SENSATION: Sensation intact to light touch throughout.  COORDINATION: Intact finger-nose with eyes open.  GAIT:  Walks without assistance.   Good speed. Normal stride length and heel strike. Normal turns. Normal arm swing.   Able to toe, heel walk. Able to tandem walk.       MEDICAL RECORDS  Personally reviewed radiation oncology and oncology notes.     LABS  Personally reviewed all available lab results; CBC, CMP, bilirubin direct, magnesium.    IMAGING  Personally reviewed MR brain imaging from pre-radiation imaging (top) as compared to today (bottom). To my eye, there has been healing and anticipated contraction of the resection cavity with no concerning remaining contrast enhancement. Moreover, the subtle amount of T2 FLAIR noted at the anterior margin of the resection cavity has decreased as well.         Imaging was shown to and results were reviewed with Matt.       IMPRESSION  Clinic time for this high complexity visit was spent discussing in detail the nature of his cancer in light of his ongoing treatment plan per protocol.    Matt is doing very well today. He notes no new or worsening neurological complaints and denies any events concerning for a seizure. Of note, Matt's blood pressure was evaluated today in clinic at 160's systolic. While he  denied any chest pain, shortness of breath, or vision changes, he was experiencing a mild headache. This elevation in blood pressure could be situational/ anxiety-associated. Clinical trial RNCC updated and she will check in with Matt later this week to confirm blood pressure is lower and headache has resolved.     I have personally reviewed the end of treatment radiation oncology notes from Dr. Hernandez and STEPHANIE Dupree.     Imaging as detailed above with an initial positive treatment response.     With regard to cancer-directed therapy, the plan is to start adjuvant temozolomide +/- Enzastaurin per the clinical trial protocol. The previously reviewed common side effects of temozolomide can still be anticipated and were discussed as including, but not limited to, fatigue, nausea, and constipation. Bone marrow suppression can result in leukopenia and thrombocytopenia. I personally reviewed his labs from today and there are no concerns. Matt has been tolerating treatment well with no excessive toxicity, but will continue to monitor for side effects/ toxicity per protocol. I alerted the clinical trial RNCC and pharmacy to the plan.     Matt hayes in [place] and his family lives there. We have previously confirmed with the sponsor that he can transition his care to the enrolling site there. Our team and Matt have been in touch with Dr. Jeremy Rivers, radiation oncologist at the [facility]. Our research coordinator has confirmed the logistics of one more visit here prior to transitioning care to [place]. I am confident that there will be no disruption to his continuity of care.    PROBLEM LIST  Glioblastoma  Aphasia    PLAN  -CANCER DIRECTED THERAPY-  -Will initiate adjuvant temozolomide at 150mg/m2 (300 mg) +/- Enzastaurin 500mg daily, cycle 1 (start date of 11/16).     -Supportive medications; Compazine and bowel regimen.    *Zofran is not allowed on the clinical trial.     -Repeat 28 day cycle if WBC  >= 3, ANC >= 1.5, HgB >= 10, and platelets >= 100.  -Surveillance labs reviewed, at goal for chemotherapy.     -Post-radiation imaging without any concerns. Repeat imaging in 2 months.    -STEROIDS-  -Currently off dexamethasone.    -SEIZURE MANAGEMENT-  -Given history of seizures, will continue current antiepileptic regimen of Keppra for the foreseeable future.  -On Keppra.    -Quality of life/ MOOD/ FATIGUE-  -History of anxiety surrounding diagnosis and treatment.   -Following with Dr. Lane; developed strategies for navigating discussions with friends.  -Continue to monitor mood as untreated/ undertreated depression can worsen fatigue, dysorexia, and quality of life.    It has been an honor to take part in Matt's care to this point.     Shirin Stone MD  Neuro-oncology

## 2022-11-15 ENCOUNTER — HOSPITAL ENCOUNTER (OUTPATIENT)
Dept: MRI IMAGING | Facility: CLINIC | Age: 52
Discharge: HOME | End: 2022-11-15
Attending: PSYCHIATRY & NEUROLOGY | Admitting: PSYCHIATRY & NEUROLOGY
Payer: COMMERCIAL

## 2022-11-15 ENCOUNTER — VIRTUAL VISIT (OUTPATIENT)
Dept: ONCOLOGY | Facility: CLINIC | Age: 52
End: 2022-11-15
Attending: PSYCHIATRY & NEUROLOGY
Payer: COMMERCIAL

## 2022-11-15 ENCOUNTER — RESEARCH ENCOUNTER (OUTPATIENT)
Dept: ONCOLOGY | Facility: CLINIC | Age: 52
End: 2022-11-15

## 2022-11-15 ENCOUNTER — LAB (OUTPATIENT)
Dept: INFUSION THERAPY | Facility: CLINIC | Age: 52
End: 2022-11-15
Attending: PSYCHIATRY & NEUROLOGY
Payer: COMMERCIAL

## 2022-11-15 VITALS
HEART RATE: 69 BPM | TEMPERATURE: 97.6 F | BODY MASS INDEX: 26.68 KG/M2 | RESPIRATION RATE: 20 BRPM | SYSTOLIC BLOOD PRESSURE: 162 MMHG | DIASTOLIC BLOOD PRESSURE: 88 MMHG | OXYGEN SATURATION: 99 % | HEIGHT: 72 IN | WEIGHT: 197 LBS

## 2022-11-15 VITALS
HEART RATE: 69 BPM | OXYGEN SATURATION: 99 % | HEIGHT: 72 IN | WEIGHT: 197 LBS | DIASTOLIC BLOOD PRESSURE: 88 MMHG | TEMPERATURE: 97.6 F | RESPIRATION RATE: 20 BRPM | SYSTOLIC BLOOD PRESSURE: 162 MMHG | BODY MASS INDEX: 26.68 KG/M2

## 2022-11-15 VITALS — SYSTOLIC BLOOD PRESSURE: 163 MMHG | HEART RATE: 74 BPM | DIASTOLIC BLOOD PRESSURE: 92 MMHG

## 2022-11-15 DIAGNOSIS — C71.9 GLIOBLASTOMA (H): Primary | ICD-10-CM

## 2022-11-15 DIAGNOSIS — Z00.6 EXAMINATION OF PARTICIPANT OR CONTROL IN CLINICAL RESEARCH: ICD-10-CM

## 2022-11-15 DIAGNOSIS — C71.9 GLIOBLASTOMA (H): ICD-10-CM

## 2022-11-15 LAB
ALBUMIN SERPL-MCNC: 3.9 G/DL (ref 3.4–5)
ALP SERPL-CCNC: 66 U/L (ref 40–150)
ALT SERPL W P-5'-P-CCNC: 26 U/L (ref 0–70)
ANION GAP SERPL CALCULATED.3IONS-SCNC: 1 MMOL/L (ref 3–14)
AST SERPL W P-5'-P-CCNC: 24 U/L (ref 0–45)
BASOPHILS # BLD AUTO: 0 10E3/UL (ref 0–0.2)
BASOPHILS NFR BLD AUTO: 1 %
BILIRUB DIRECT SERPL-MCNC: 0.1 MG/DL (ref 0–0.2)
BILIRUB SERPL-MCNC: 0.5 MG/DL (ref 0.2–1.3)
BUN SERPL-MCNC: 17 MG/DL (ref 7–30)
CALCIUM SERPL-MCNC: 9 MG/DL (ref 8.5–10.1)
CHLORIDE BLD-SCNC: 104 MMOL/L (ref 94–109)
CO2 SERPL-SCNC: 30 MMOL/L (ref 20–32)
CREAT SERPL-MCNC: 0.86 MG/DL (ref 0.66–1.25)
EOSINOPHIL # BLD AUTO: 0.2 10E3/UL (ref 0–0.7)
EOSINOPHIL NFR BLD AUTO: 3 %
ERYTHROCYTE [DISTWIDTH] IN BLOOD BY AUTOMATED COUNT: 13.6 % (ref 10–15)
GFR SERPL CREATININE-BSD FRML MDRD: >90 ML/MIN/1.73M2
GLUCOSE BLD-MCNC: 106 MG/DL (ref 70–99)
HCT VFR BLD AUTO: 46.2 % (ref 40–53)
HGB BLD-MCNC: 15.4 G/DL (ref 13.3–17.7)
IMM GRANULOCYTES # BLD: 0 10E3/UL
IMM GRANULOCYTES NFR BLD: 1 %
LYMPHOCYTES # BLD AUTO: 1.1 10E3/UL (ref 0.8–5.3)
LYMPHOCYTES NFR BLD AUTO: 18 %
MAGNESIUM SERPL-MCNC: 2.4 MG/DL (ref 1.6–2.3)
MCH RBC QN AUTO: 29.9 PG (ref 26.5–33)
MCHC RBC AUTO-ENTMCNC: 33.3 G/DL (ref 31.5–36.5)
MCV RBC AUTO: 90 FL (ref 78–100)
MONOCYTES # BLD AUTO: 0.8 10E3/UL (ref 0–1.3)
MONOCYTES NFR BLD AUTO: 12 %
NEUTROPHILS # BLD AUTO: 4 10E3/UL (ref 1.6–8.3)
NEUTROPHILS NFR BLD AUTO: 65 %
NRBC # BLD AUTO: 0 10E3/UL
NRBC BLD AUTO-RTO: 0 /100
PLATELET # BLD AUTO: 206 10E3/UL (ref 150–450)
POTASSIUM BLD-SCNC: 4.1 MMOL/L (ref 3.4–5.3)
PROT SERPL-MCNC: 7.6 G/DL (ref 6.8–8.8)
RBC # BLD AUTO: 5.15 10E6/UL (ref 4.4–5.9)
SODIUM SERPL-SCNC: 135 MMOL/L (ref 133–144)
WBC # BLD AUTO: 6.1 10E3/UL (ref 4–11)

## 2022-11-15 PROCEDURE — A9585 GADOBUTROL INJECTION: HCPCS | Performed by: PSYCHIATRY & NEUROLOGY

## 2022-11-15 PROCEDURE — 80053 COMPREHEN METABOLIC PANEL: CPT | Performed by: PSYCHIATRY & NEUROLOGY

## 2022-11-15 PROCEDURE — 82248 BILIRUBIN DIRECT: CPT | Performed by: PSYCHIATRY & NEUROLOGY

## 2022-11-15 PROCEDURE — 255N000002 HC RX 255 OP 636: Performed by: PSYCHIATRY & NEUROLOGY

## 2022-11-15 PROCEDURE — 82040 ASSAY OF SERUM ALBUMIN: CPT | Performed by: PSYCHIATRY & NEUROLOGY

## 2022-11-15 PROCEDURE — 36415 COLL VENOUS BLD VENIPUNCTURE: CPT | Performed by: PSYCHIATRY & NEUROLOGY

## 2022-11-15 PROCEDURE — 83735 ASSAY OF MAGNESIUM: CPT | Performed by: PSYCHIATRY & NEUROLOGY

## 2022-11-15 PROCEDURE — 85025 COMPLETE CBC W/AUTO DIFF WBC: CPT | Performed by: PSYCHIATRY & NEUROLOGY

## 2022-11-15 PROCEDURE — 70553 MRI BRAIN STEM W/O & W/DYE: CPT

## 2022-11-15 PROCEDURE — 99215 OFFICE O/P EST HI 40 MIN: CPT | Mod: 95 | Performed by: PSYCHIATRY & NEUROLOGY

## 2022-11-15 RX ORDER — GADOBUTROL 604.72 MG/ML
9 INJECTION INTRAVENOUS ONCE
Status: COMPLETED | OUTPATIENT
Start: 2022-11-15 | End: 2022-11-15

## 2022-11-15 RX ADMIN — GADOBUTROL 9 ML: 604.72 INJECTION INTRAVENOUS at 13:12

## 2022-11-15 ASSESSMENT — PAIN SCALES - GENERAL
PAINLEVEL: NO PAIN (0)
PAINLEVEL: NO PAIN (0)

## 2022-11-15 NOTE — NURSING NOTE
HUMBERTOOVO CLINICAL TRIAL VISIT NOTE      Date of Visit: 11/15/2022     Study: A Randomized, Double-Blind, Placebo-Controlled Phase 3 Study of Enzastaurin Added to Temozolomide During and Following Radiation Therapy in Newly Diagnosed Glioblastoma Patients Who Possess the Novel Genomic Biomarker DGM1     [facility]#: 2020   [facility]#: [id number]  IDS#: [id number]  Subject Name: Laz Stein  Subject ID: [id number]     Visit: Adjuvant Phase Cycle 1 Day 1     Did the study visit occur within the appropriate window allowed by the protocol? Yes     Clinical Trial Treatment History:  -7/2022 PRESENTATION: Conductive aphasia, seizure activity.  -7/19/2022 MR brain imaging demonstrated a heterogeneously enhancing mixed solid and cystic mass in the left parietal lobe. Mild local mass effect and no midline shift.   -7/20/2022 SURGERY: Craniotomy with 5ALA-guided resection by Dr. Reese.   PATHOLOGY: Glioblastoma; IDH1, IDH2 wildtype. MGMT promoter unmethylated.               BRAF, PTEN, TP53 not mutated.              This infiltrating glioma shows palisading tumor necrosis and microvascular proliferation.  Post-operative MR brain imaging demonstrated gross total resection.  -8/12/2022 NEURO-ONC: Recommending upfront chemoradiotherapy on or off a clinical trial.   -8/22/2022 NEURO-ONC/ CLINICAL TRIAL: Clinically with numbness in the right leg. Screening labs. Planning MRI tomorrow.   -9/6 - 10/17/2022 CHEMORADS: 60cGy with concurrent temozolomide 160 mg daily and enzastaurin 250 mg daily vs placebo.  -9/6/22 NEURO-ONC: Started treatment per the DENOVO clinical trial.   -9/13/2022 NEURO-ONC/CHEMO-RAD/CLINICAL TRIAL: Lab visit only.  -9/21/2022 NEURO-ONC/CHEMO-RAD/CLINICAL TRIAL: Week 3 Provider visit and labs.   -9/27/2022 NEURO-ONC/CHEMO-RAD/CLINICAL TRIAL: Lab visit only.  -10/7/2022 NEURO-ONC/CHEMO-RAD/CLINICAL TRIAL: Week 5 Provider visit and labs.  -10/12/2022 NEURO-ONC/CHEMO-RAD/CLINICAL TRIAL: Lab visit only.  -10/18/22  "NEURO-ONC/CHEMO: Completed upfront treatment per DENO clinical trial protocol. Will continue enzastaurin daily vs placebo.  -11/1/2022 NEURO-ONC/CLINICAL TRIAL: Lab visit only. Increase enzastaurin/placebo to 500 mg today.  -11/15/2022 NEURO-ONC/ MRB/ CLINICAL TRIAL: Clinically well. Imaging with no concerns. Starting adjuvant temozolomide 150mg/m2 (300mg) +/- Enzastaurin 500mg daily, cycle 1 (start date 11/16).     Vitals:   BP (!) 162/88 (BP Location: Right arm, Patient Position: Sitting, Cuff Size: Adult Large)   Pulse 69   Temp 97.6  F (36.4  C) (Oral)   Resp 20   Ht 1.82 m (5' 11.65\")   Wt 89.4 kg (197 lb)   SpO2 99%   BMI 26.98 kg/m      - Hypertension: Start Grade 3  - All other criteria grade 0/normal.     Baseline Weight: 85.6 kg  % Weight Change from Baseline: +4.4%    Abnormal Assessment Findings Per Dr. Stone and AE Grading:     Medical History or ongoing AEs:              Previously had an episode of tingling involving the right hip and thigh which resolved on its own  - Intermittent paresthesia: bilateral lower extremity: Grade 1              Mild infrequent frontal headache at times, none currently  - Intermittent headache: Grade 1              Pruritus and dermatitis of the left parietal scalp that he is treating with topical moisturizers  - Dermatitis radiation: Grade 1              Word finding somewhat better, still feels like he'll occasionally struggle to choose the right word but not noticeable to those around him  - Dysphasia: Grade 1              Occasional gas and constipation, none currently  - Constipation: Grade 1              Mild-moderate erythema of the left parietal scalp. No desquamation  - Alopecia: Grade 2    - All other criteria grade 0/normal.     I have personally interviewed Laz Stein and reviewed his medical record for adverse events and these have been recorded on the corresponding logs in Laz Stein's research file.      CONCOMITANT MEDICATIONS: "   I have personally reviewed concomitant medications and these have been recorded on the corresponding logs in Matt's research file.  See full list of medications below:     Current Outpatient Medications   Medication Sig Dispense Refill     levETIRAcetam (KEPPRA) 1000 MG tablet Take 1 tablet (1,000 mg) by mouth 2 times daily 60 tablet 11     prochlorperazine (COMPAZINE) 10 MG tablet Take 1 tablet (10 mg) by mouth every 6 hours as needed (Nausea/Vomiting) 30 tablet 2     study - enzastaurin vs placebo, IDS# [id number], 125 mg TABS tablet Take 4 tablets (500 mg) by mouth daily Within 30 minutes of completing a meal, preferably approximately the same time each day. The tablets must be swallowed whole, no crushing or breaking allowed. 112 tablet 0     temozolomide (TEMODAR) 100 MG capsule Take 3 capsules (300 mg) by mouth daily for 5 doses Day 1 thru 5 of each cycle. Take at bedtime on an empty stomach. 15 capsule 0         DRUG ALLERGIES:    No Known Allergies    LAB RESULTS:  Labs including CBC, CMP, Mag, Direct Bili, research urine, and research blood were drawn. Labs were reviewed- any significant lab values were addressed and reviewed.       - All criteria grade 0/normal or abnormal but not accompanied by clinical symptoms or leading to treatment changes, additional therapies, medical interventions, or additional testing or not scored per CTCAE criteria.     Component      Latest Ref Rng & Units 11/15/2022   Anion Gap      3 - 14 mmol/L 1 (L)   Glucose      70 - 99 mg/dL 106 (H)   Magnesium      1.6 - 2.3 mg/dL 2.4 (H)     Performance Status (KPS): 100    Percentage    100 Normal, no complaints, no evidence of disease   90 Able to carry on normal activity; minor signs or symptoms of disease   80 Normal activity with effort; some signs or symptoms of disease   70 Cares for self; unable to carry on normal activity or do active work   60 Requires occasional assistance, but is able to care for most of his/her needs   50  Requires considerable assistance and frequent medical care   40 Disabled; requires special care and assistance   30 Severely disabled, hospitalization indicated. Death not imminent   20 Very sick, hospitalization necessary, active supportive treatment necessary   10 Moribund, fatal processes, progressing rapidly   0 Dead       Overall Treatment Plan for Clinical Trial:   This is a randomized controlled Phase 3 trial evaluating the effect of enzastaurin in patients with glioblastoma, where the primary endpoint is OS. Patients with newly diagnosed glioblastoma who meet all eligibility criteria will be enrolled. Randomization will occur within approximately 6 weeks after resection of the glioblastoma. Study treatment (Day 1) is recommended to begin on the same day or no later than 3 days after randomization. The 3 phases of the study are the same as in the run-in part except for patients receiving placebo in place of enzastaurin. Patients will be randomized 1:1 to Arm A: RT plus temozolomide and enzastaurin for 6 weeks, ending with the last dose of RT and Day 42 of temozolomide and enzastaurin therapy (Concurrent Phase), followed by enzastaurin alone for 21 to 35 days (Single-Agent Phase), then temozolomide for 6 cycles (or up to 12 cycles according to SOC) and enzastaurin up to a total of 24 cycles (1 cycle = 28 days) (Adjuvant Phase); or Arm B: RT plus temozolomide and placebo for 6 weeks, ending with Day 42 of temozolomide and placebo therapy (Concurrent Phase) followed by placebo alone for 21 to 35 days (Single-Agent Phase), then temozolomide for 6 cycles (or up to 12 cycles according to SOC) and placebo up to a total of 24 cycles (1 cycle = 28 days) (Adjuvant Phase).     Since temozolomide should be administered on an empty stomach or at least 2 hours after a meal and enzastaurin/placebo should be administered within 30 minutes after a meal, patients should take temozolomide on an empty stomach, after an  "appropriate wait (recommend ~60 minutes) eat a meal, and then take the enzastaurin/placebo dose. Alternatively, patients may take temozolomide at bedtime and enzastaurin/placebo in the morning with or within 30 minutes after breakfast.    Medication Count/IDS Note:  Drug Name: Enzastaurin/placebo  IDS#: [id number]    Number of bottles returned: 1  Lot number(s): [id number]  Number of pills returned: 48    Number of bottles dispensed: 1  Lot number(s): [id number]  Number of pills dispensed: 128    There is no drug diary for this study.  Matt was given a new dosing instruction sheet and was instructed how to take temozolomide and enzastaurin/placebo for this next cycle.    Are there any discrepancies between the amount of medication the patient was instructed to take and the amount that would be expected to be returned? No, he took two tablets for 14 days and four tablets for the rest of the Single-Agent Phase as instructed.     Research RN Impression:   Matt is feeling well. His BP was elevated today. He said he was feeling anxious about his scan results. BP was rechecked after Dr. Stone's virtual visit when he was more calm, and it remained elevated, but Matt said he did not have a headache and would check it again at home. He said it fluctuates. Writer notified Dr. Stone and RNCC. Per Dr. Stone IB message, his Grade 3 hypertension is unrelated to the study drug given his report of a history of hypertension when he was on Amlodipine.     Research RN told him to call nurse triage with symptoms and to call [phone] and ask for the \"oncology fellow on call\" if it is after business hours.    His MRI was reviewed with him by Dr. Stone. He is flying back home late tonight. His temodar was not delivered today, so he will start Cycle 1 when delivered tomorrow night (12/16). His blood and urine were taken for research kits in clinic.     Laz Stein was given the opportunity to ask any trial related questions. The patient will " be back in a few weeks for labs, vitals, and KPS assessment. The patient knows to call with any new or worsening symptoms or concerns.      Please see provider progress note for full physical exam and other clinical information.      Mikayla Murcia, Clinical Research Coordinator, RN.  [facility] [place], [facility]   Clinical Trials Office  Solid Tumor Unit

## 2022-11-15 NOTE — NURSING NOTE
Patient declined individual allergy and medication review by support staff because they deny any changes and state that all information remains accurate since last reviewed/verified.    Tiana Butler VF

## 2022-11-15 NOTE — PROGRESS NOTES
Matt is a 52 year old who is being evaluated via a billable video visit.      How would you like to obtain your AVS? Guidekickhart  If the video visit is dropped, the invitation should be resent by: Text to cell phone: [phone]  Will anyone else be joining your video visit? Norma WELLS      Video-Visit Details  Type of service:  Video Visit    Video Start Time: 3:14 PM  Video End Time: 3:40 PM    Originating Location (pt. Location): Home    Distant Location (provider location):  Off-site    Platform used for Video Visit: [employer]eth

## 2022-11-15 NOTE — PROGRESS NOTES
Medical Assistant Note:  Laz Stein presents today for lab draw.    Patient seen by provider today: No.   present during visit today: Not Applicable.    Concerns: No Concerns.    Procedure:  Lab draw site: LAC, Needle type: BF, Gauge: 23. Gauze and coban applied    Post Assessment:  Labs drawn without difficulty: Yes.    Discharge Plan:  Departure Mode: Ambulatory.    Face to Face Time: 5.    Concepcion Gomez CMA

## 2022-11-15 NOTE — Clinical Note
"    11/15/2022         RE: Laz Stein  [address] [facility] [address]        Dear Colleague,    Thank you for referring your patient, Laz Stein, to the [facility]. Please see a copy of my visit note below.    NEURO-ONCOLOGY VISIT  Nov 15, 2022    CHIEF COMPLAINT: Mr. 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 He has since completed upfront chemoradiotherapy per the Denovo clinical trial (Randomized, Double-Blind, Placebo-Controlled Phase 3 Study of Enzastaurin Added to Temozolomide During and Following Radiation Therapy in Newly Diagnosed Glioblastoma Patients Who Possess the Novel Genomic Biomarker DGM1) as of 10/17/2022.    Matt presents today for follow up.    HISTORY OF PRESENT ILLNESS  -Matt is doing well today.   -Enjoyed the break from chemoradiotherapy.  -Good energy levels. Trying to remain active.   -Infrequent headaches; mainly triggered by stress and fatigue.   -Denies any changes in sensation. No weakness. No vision changes.  -Word finding is again better. No new memory or cognitive issues.   -On Keppra, no activity concerning for seizures.       MEDICATIONS   Current Outpatient Medications   Medication Sig Dispense Refill     levETIRAcetam (KEPPRA) 1000 MG tablet Take 1 tablet (1,000 mg) by mouth 2 times daily 60 tablet 11     prochlorperazine (COMPAZINE) 10 MG tablet Take 1 tablet (10 mg) by mouth every 6 hours as needed (Nausea/Vomiting) 30 tablet 2     study - enzastaurin vs placebo, IDS# [id number], 125 mg TABS tablet Take 4 tablets (500 mg) by mouth daily Within 30 minutes of completing a meal, preferably approximately the same time each day. The tablets must be swallowed whole, no crushing or breaking allowed. 112 tablet 0     temozolomide (TEMODAR) 100 MG capsule Take 3 capsules (300 mg) by mouth daily for " "5 doses Day 1 thru 5 of each cycle. Take at bedtime on an empty stomach. 15 capsule 0     DRUG ALLERGIES No Known Allergies      ONCOLOGIC HISTORY  -7/2022 PRESENTATION: Conductive aphasia, seizure activity.  -7/19/2022 MR brain imaging demonstrated a heterogeneously enhancing mixed solid and cystic mass in the left parietal lobe. Mild local mass effect and no midline shift.   -7/20/2022 SURGERY: Craniotomy with 5ALA-guided resection by Dr. Reese.   PATHOLOGY: Glioblastoma; IDH1, IDH2 wildtype. MGMT promoter unmethylated.    BRAF, PTEN, TP53 not mutated.   This infiltrating glioma shows palisading tumor necrosis and microvascular proliferation.  Post-operative MR brain imaging demonstrated gross total resection.  -8/12/2022 NEURO-ONC: Recommending upfront chemoradiotherapy on or off a clinical trial.   -8/22/2022 NEURO-ONC/ CLINICAL TRIAL: Clinically with numbness in the right leg. Screening labs. Planning MRI tomorrow.   -9/6 - 10/17/2022 CHEMORADS: 60cGy with concurrent temozolomide 160 mg daily and enzastaurin 250 mg daily vs placebo.  -9/6/22 NEURO-ONC: Started treatment per the DENOVO clinical trial.   -10/18/22 NEURO-ONC/ CHEMO: Completed upfront treatment per DENOVO clinical trial protocol. Will continue enzastaurin daily vs placebo.  -11/15/2022 NEURO-ONC/ MRB/ CLINICAL TRAIL: Clinically well. Imaging with no concerns. Starting adjuvant temozolomide 150mg/m2 (300mg) +/- Enzastaurin 500mg daily, cycle 1 (start date 11/16).       PHYSICAL EXAMINATION  There were no vitals taken for this visit.   Wt Readings from Last 2 Encounters:   11/15/22 89.4 kg (197 lb)   10/18/22 89.4 kg (197 lb)      Ht Readings from Last 2 Encounters:   11/15/22 1.82 m (5' 11.65\")   08/12/22 1.845 m (6' 0.64\")     Physical Exam Completed by Brunilda Escamilla PA-C on behalf of Dr. Stone who is seeing the patient virtually today.    General: Well-appearing male in no acute distress.  Cardiovascular: RRR No murmurs, gallops, or rubs. "   Respiratory: CTA bilaterally. No wheezes or crackles.  Gastrointestinal: BS +. Abdomen soft, non-tender. No palpable hepatosplenomegaly or masses.   Skin: No rashes, petechiae, or bruising noted on exposed skin.    Neurologic:   MENTAL STATUS:     Alert, oriented to date.   Speech fluent.    Comprehension intact to multi-step commands.   Good right-left orientation.     Recalled 3/3 words but in different order***.   CRANIAL NERVES:     Pupils are equal, round.    Extraocular movements full, denies diplopia.     Visual fields full.     Symmetric facial movements.   Hearing intact.   No dysarthria.  MOTOR:    No pronation or drift.    Able to rise from a chair without use of arms.   On toe/ heel walk, equal distance from floor to heels/ toes.   SENSATION: Sensation intact to light touch throughout.  COORDINATION: Intact finger-nose with eyes open.  GAIT:  Walks without assistance.   Good speed. Normal stride length and heel strike. Normal turns. Normal arm swing.   Able to toe, heel walk. Able to tandem walk.       MEDICAL RECORDS  Personally reviewed radiation oncology and oncology notes.     LABS  Personally reviewed all available lab results; CBC, CMP, bilirubin direct, magnesium.    IMAGING  Personally reviewed MR brain imaging from pre-radiation imaging (top) as compared to today (bottom). To my eye, there has been healing and anticipated contraction of the resection cavity with no concerning remaining contrast enhancement. Moreover, the subtle amount of T2 FLAIR noted at the anterior margin of the resection cavity has decreased as well.         Imaging was shown to and results were reviewed with Matt.       IMPRESSION  Clinic time for this high complexity visit was spent discussing in detail the nature of his cancer in light of his ongoing treatment plan per protocol.    Matt is doing very well today. He notes no new or worsening neurological complaints and denies any events concerning for a seizure.     I  have personally reviewed the end of treatment radiation oncology notes from Dr. Hernandez and STEPHANIE Dupree.     Imaging as detailed above with an initial positive treatment response.     With regard to cancer-directed therapy, the plan is to start adjuvant temozolomide +/- Enzastaurin per the clinical trial protocol. The previously reviewed common side effects of temozolomide can still be anticipated and were discussed as including, but not limited to, fatigue, nausea, and constipation. Bone marrow suppression can result in leukopenia and thrombocytopenia. I personally reviewed his labs from today and there are no concerns. Matt has been tolerating treatment well with no excessive toxicity, but will continue to monitor for side effects/ toxicity per protocol. I alerted the clinical trial RNCC and pharmacy to the plan.     Matt hayes in [place] and his family lives there. We have previously confirmed with the sponsor that he can transition his care to the enrolling site there. Our team and Matt have been in touch with Dr. Jeremy Rivers, radiation oncologist at the [facility]. Our research coordinator has confirmed the logistics of one more visit here prior to transitioning care to [place]. I am confident that there will be no disruption to his continuity of care.    PROBLEM LIST  Glioblastoma  Aphasia    PLAN  -CANCER DIRECTED THERAPY-  -Will initiate adjuvant temozolomide at 150mg/m2 (300 mg) +/- Enzastaurin 500mg daily, cycle 1 (start date of 11/16).     -Supportive medications; Compazine and bowel regimen.    *Zofran is not allowed on the clinical trial.     -Repeat 28 day cycle if WBC >= 3, ANC >= 1.5, HgB >= 10, and platelets >= 100.  -Surveillance labs reviewed, at goal for chemotherapy.     -Post-radiation imaging without any concerns. Repeat imaging in 2 months.    -STEROIDS-  -Currently off dexamethasone.    -SEIZURE MANAGEMENT-  -Given history of seizures, will continue current antiepileptic  regimen of Keppra for the foreseeable future.  -On Keppra.    -Quality of life/ MOOD/ FATIGUE-  -History of anxiety surrounding diagnosis and treatment.   -Following with Dr. Lane; developed strategies for navigating discussions with friends.  -Continue to monitor mood as untreated/ undertreated depression can worsen fatigue, dysorexia, and quality of life.    It has been an honor to take part in Matt's care to this point.     Shirin Stone MD  Neuro-oncology    Matt is a 52 year old who is being evaluated via a billable video visit.      How would you like to obtain your AVS? MyChart  If the video visit is dropped, the invitation should be resent by: Text to cell phone: [phone]  Will anyone else be joining your video visit? No    Tiana WELLS      Video-Visit Details  Type of service:  Video Visit    Video Start Time: 3:14 PM  Video End Time: 3:40 PM    Originating Location (pt. Location): Home    Distant Location (provider location):  Off-site    Platform used for Video Visit: Well         Again, thank you for allowing me to participate in the care of your patient.        Sincerely,        Shirin Stone MD

## 2022-11-16 ENCOUNTER — RESEARCH ENCOUNTER (OUTPATIENT)
Dept: ONCOLOGY | Facility: CLINIC | Age: 52
End: 2022-11-16

## 2022-11-18 ENCOUNTER — PATIENT OUTREACH (OUTPATIENT)
Dept: ONCOLOGY | Facility: CLINIC | Age: 52
End: 2022-11-18

## 2022-11-18 ENCOUNTER — RESEARCH ENCOUNTER (OUTPATIENT)
Dept: ONCOLOGY | Facility: CLINIC | Age: 52
End: 2022-11-18

## 2022-11-18 NOTE — NURSING NOTE
Research RN attempted to contact patient 11/17 to check in and directed him to call triage if still having high BP and headaches with no response.

## 2022-11-18 NOTE — NURSING NOTE
"Research RN received e-mail on 11/16 from patient saying: \"Just wanted to check in and let you know that my blood pressure is still staying fairly high and driving home last night I did get a headache and didn t feel very well so I was just wondering if you could check in with Dr. Stone and see if I should go on something for my blood pressure or what her thoughts are?\"    Writer sent IB to Dr. Stone and Lakeshia and directed Matt to call [phone], option 5, then 2, but if needing to call after business hours - the hospital  at [phone], and ask for the \"oncology fellow on call.\" No response from Matt.  "

## 2022-11-18 NOTE — PROGRESS NOTES
"[facility]: Cancer Care                                                                                          Received a message from our research nurse stating patient checked his BP at home and it's still fairly high, he had a headache driving home the previous night. It is Grade 3 hypertension, so asking if there any causality between HTN and the study drug?       Message from Dr Stone asking to check in with Matt on 11/18 to see how he is and if is BP has normalized? Is he still experiencing headaches? Does he have a h/o HTN? Is he supposed to be taking BP medication?    I spoke with patient. He does have a history of mild hypertension and was on Amlodipine. He discontinued the Amlodipine because his BP \"was all over the place\", he didn't feel it was helping. He was very nervous about his scan this week so was not surprised he had headaches from elevated BP. The headaches have cleared and he is feeling well. His BP was still elevated yesterday but he was unable to remember the reading. He has not checked today. Message sent to Dr Stone for recommendations.            Lakeshia Doyle, RN, BSN  Oncology/Neurosurgery Care Coordinator  [facility]   "

## 2022-11-19 ENCOUNTER — HEALTH MAINTENANCE LETTER (OUTPATIENT)
Age: 52
End: 2022-11-19

## 2022-11-22 ENCOUNTER — TELEPHONE (OUTPATIENT)
Dept: ONCOLOGY | Facility: CLINIC | Age: 52
End: 2022-11-22

## 2022-11-22 NOTE — PROGRESS NOTES
Received positive distress screen regarding patient's request to speak with RD.  Called and left RD contact information on patient's voicemail.  Await return call from patient.    Hung Grimes, RD, LD  Clinical Dietitian  [facility]  [phone] (direct)

## 2022-12-01 ENCOUNTER — DOCUMENTATION ONLY (OUTPATIENT)
Dept: ONCOLOGY | Facility: CLINIC | Age: 52
End: 2022-12-01

## 2022-12-01 NOTE — NURSING NOTE
Request for most recent office visit received from [facility].    Office note faxed to [phone]    Soumya Balbuena CMA ([facility])

## 2022-12-02 NOTE — NURSING NOTE
[facility] CLINICAL TRIAL RE-CONSENT NOTE    New information has been added to the consent form. This was explained to the patient, and all his questions were answered. The patient verbalized understanding and would like to continue participation in the trial. The patient was provided with a signed copy of the IRB-approved consent form.    Consent Version Date: 12OCT2022  Consent obtained by: Mikayla Murcia RN     Date: [id number]    Mikayla Murcia, Clinical Research Coordinator, RN.  [facility], [facility]   Clinical Trials Office  Solid Tumor Unit

## 2022-12-07 ENCOUNTER — LAB (OUTPATIENT)
Dept: LAB | Facility: CLINIC | Age: 52
End: 2022-12-07
Attending: PSYCHIATRY & NEUROLOGY
Payer: COMMERCIAL

## 2022-12-07 ENCOUNTER — APPOINTMENT (OUTPATIENT)
Dept: LAB | Facility: CLINIC | Age: 52
End: 2022-12-07
Payer: COMMERCIAL

## 2022-12-07 ENCOUNTER — RESEARCH ENCOUNTER (OUTPATIENT)
Dept: ONCOLOGY | Facility: CLINIC | Age: 52
End: 2022-12-07

## 2022-12-07 VITALS
DIASTOLIC BLOOD PRESSURE: 81 MMHG | SYSTOLIC BLOOD PRESSURE: 150 MMHG | HEART RATE: 65 BPM | WEIGHT: 192.85 LBS | OXYGEN SATURATION: 96 % | BODY MASS INDEX: 26.41 KG/M2 | RESPIRATION RATE: 16 BRPM | TEMPERATURE: 97.5 F

## 2022-12-07 DIAGNOSIS — C71.9 GLIOBLASTOMA (H): Primary | ICD-10-CM

## 2022-12-07 LAB
ALBUMIN SERPL BCG-MCNC: 4.3 G/DL (ref 3.5–5.2)
ALP SERPL-CCNC: 61 U/L (ref 40–129)
ALT SERPL W P-5'-P-CCNC: 13 U/L (ref 10–50)
ANION GAP SERPL CALCULATED.3IONS-SCNC: 8 MMOL/L (ref 7–15)
AST SERPL W P-5'-P-CCNC: 19 U/L (ref 10–50)
BASOPHILS # BLD AUTO: 0 10E3/UL (ref 0–0.2)
BASOPHILS NFR BLD AUTO: 1 %
BILIRUB DIRECT SERPL-MCNC: <0.2 MG/DL (ref 0–0.3)
BILIRUB SERPL-MCNC: 0.6 MG/DL
BUN SERPL-MCNC: 14.3 MG/DL (ref 6–20)
CALCIUM SERPL-MCNC: 9.4 MG/DL (ref 8.6–10)
CHLORIDE SERPL-SCNC: 102 MMOL/L (ref 98–107)
CREAT SERPL-MCNC: 0.98 MG/DL (ref 0.67–1.17)
DEPRECATED HCO3 PLAS-SCNC: 28 MMOL/L (ref 22–29)
EOSINOPHIL # BLD AUTO: 0.1 10E3/UL (ref 0–0.7)
EOSINOPHIL NFR BLD AUTO: 1 %
ERYTHROCYTE [DISTWIDTH] IN BLOOD BY AUTOMATED COUNT: 13.7 % (ref 10–15)
GFR SERPL CREATININE-BSD FRML MDRD: >90 ML/MIN/1.73M2
GLUCOSE SERPL-MCNC: 111 MG/DL (ref 70–99)
HCT VFR BLD AUTO: 45.9 % (ref 40–53)
HGB BLD-MCNC: 15.2 G/DL (ref 13.3–17.7)
IMM GRANULOCYTES # BLD: 0 10E3/UL
IMM GRANULOCYTES NFR BLD: 0 %
LYMPHOCYTES # BLD AUTO: 0.8 10E3/UL (ref 0.8–5.3)
LYMPHOCYTES NFR BLD AUTO: 15 %
MAGNESIUM SERPL-MCNC: 2 MG/DL (ref 1.7–2.3)
MCH RBC QN AUTO: 29.1 PG (ref 26.5–33)
MCHC RBC AUTO-ENTMCNC: 33.1 G/DL (ref 31.5–36.5)
MCV RBC AUTO: 88 FL (ref 78–100)
MONOCYTES # BLD AUTO: 0.5 10E3/UL (ref 0–1.3)
MONOCYTES NFR BLD AUTO: 10 %
NEUTROPHILS # BLD AUTO: 3.8 10E3/UL (ref 1.6–8.3)
NEUTROPHILS NFR BLD AUTO: 73 %
NRBC # BLD AUTO: 0 10E3/UL
NRBC BLD AUTO-RTO: 0 /100
PLATELET # BLD AUTO: 239 10E3/UL (ref 150–450)
POTASSIUM SERPL-SCNC: 4.6 MMOL/L (ref 3.4–5.3)
PROT SERPL-MCNC: 6.8 G/DL (ref 6.4–8.3)
RBC # BLD AUTO: 5.22 10E6/UL (ref 4.4–5.9)
SODIUM SERPL-SCNC: 138 MMOL/L (ref 136–145)
WBC # BLD AUTO: 5.2 10E3/UL (ref 4–11)

## 2022-12-07 PROCEDURE — 36415 COLL VENOUS BLD VENIPUNCTURE: CPT | Performed by: PSYCHIATRY & NEUROLOGY

## 2022-12-07 PROCEDURE — 83735 ASSAY OF MAGNESIUM: CPT | Performed by: PSYCHIATRY & NEUROLOGY

## 2022-12-07 PROCEDURE — 80053 COMPREHEN METABOLIC PANEL: CPT | Performed by: PSYCHIATRY & NEUROLOGY

## 2022-12-07 PROCEDURE — 82248 BILIRUBIN DIRECT: CPT | Performed by: PSYCHIATRY & NEUROLOGY

## 2022-12-07 PROCEDURE — 85025 COMPLETE CBC W/AUTO DIFF WBC: CPT | Performed by: PSYCHIATRY & NEUROLOGY

## 2022-12-07 PROCEDURE — 99001 SPECIMEN HANDLING PT-LAB: CPT | Performed by: PSYCHIATRY & NEUROLOGY

## 2022-12-07 ASSESSMENT — PAIN SCALES - GENERAL: PAINLEVEL: NO PAIN (0)

## 2022-12-07 NOTE — NURSING NOTE
[facility] CLINICAL TRIAL VISIT NOTE      Date of Visit: 12/7/2022     Study: A Randomized, Double-Blind, Placebo-Controlled Phase 3 Study of Enzastaurin Added to Temozolomide During and Following Radiation Therapy in Newly Diagnosed Glioblastoma Patients Who Possess the Novel Genomic Biomarker [id number]     [facility]#: 2020   [facility]#: [id number]  IDS#: [id number]  Subject Name: Laz Stein  Subject ID: [id number]     Visit: Adjuvant Phase Cycle 1 Day 22     Did the study visit occur within the appropriate window allowed by the protocol? Yes     Clinical Trial Treatment History:  -7/2022 PRESENTATION: Conductive aphasia, seizure activity.  -7/19/2022 MR brain imaging demonstrated a heterogeneously enhancing mixed solid and cystic mass in the left parietal lobe. Mild local mass effect and no midline shift.   -7/20/2022 SURGERY: Craniotomy with 5ALA-guided resection by Dr. Reese.   PATHOLOGY: Glioblastoma; IDH1, IDH2 wildtype. MGMT promoter unmethylated.               BRAF, PTEN, TP53 not mutated.              This infiltrating glioma shows palisading tumor necrosis and microvascular proliferation.  Post-operative MR brain imaging demonstrated gross total resection.  -8/12/2022 NEURO-ONC: Recommending upfront chemoradiotherapy on or off a clinical trial.   -8/22/2022 NEURO-ONC/ CLINICAL TRIAL: Clinically with numbness in the right leg. Screening labs. Planning MRI tomorrow.   -9/6 - 10/17/2022 CHEMORADS: 60cGy with concurrent temozolomide 160 mg daily and enzastaurin 250 mg daily vs placebo.  -9/6/22 NEURO-ONC: Started treatment per the DENOVO clinical trial.   -9/13/2022 NEURO-ONC/CHEMO-RAD/CLINICAL TRIAL: Lab visit only.  -9/21/2022 NEURO-ONC/CHEMO-RAD/CLINICAL TRIAL: Week 3 Provider visit and labs.   -9/27/2022 NEURO-ONC/CHEMO-RAD/CLINICAL TRIAL: Lab visit only.  -10/7/2022 NEURO-ONC/CHEMO-RAD/CLINICAL TRIAL: Week 5 Provider visit and labs.  -10/12/2022 NEURO-ONC/CHEMO-RAD/CLINICAL TRIAL: Lab visit only.  -10/18/22  NEUR[facility]ONC/CHEMO: [facility]. Will continue enzastaurin daily vs placebo.  -11/1/2022 NEURO-ONC/CLINICAL TRIAL: Lab visit only. Increase enzastaurin/placebo to 500 mg today.  -11/15/2022 NEURO-ONC/ MRB/ CLINICAL TRIAL: Clinically well. Imaging with no concerns. Starting adjuvant temozolomide 150mg/m2 (300mg) +/- Enzastaurin 500mg daily, cycle 1 (start date 11/16).  -12/7/2022 NEURO-ONC/ CLINICAL TRIAL: Lab, vitals, and nurse visit only.     Vitals:   /81 Important   (BP Location: Right arm)  Pulse 65  Temp 97.5  F (36.4  C) (Oral)  Resp 16  Wt 87.5 kg (192 lb 13.6 oz)  SpO2 96%  BMI 26.41 kg/m   BSA 2.1 m     - Hypertension: Stop Grade 3, Start Grade 2.  - All criteria grade 0/normal.     Baseline Weight: 85.6 kg  % Weight Change from Baseline: +2.2%  EKG completed.  QTcF Interval (Fridericia's): 419    Abnormal Assessment Findings Per Research RN and AE Grading:     Medical History or ongoing AEs:              Previously had an episode of tingling involving the right hip and thigh which resolved on its own  - Intermittent paresthesia: bilateral lower extremity: Grade 1              Mild infrequent frontal headache at times, none currently  - Intermittent headache: Grade 1              Pruritus and dermatitis of the left parietal scalp that he is treating with topical moisturizers  - Dermatitis radiation: Grade 1              Word finding somewhat better, still feels like he'll occasionally struggle to choose the right word but not noticeable to those around him  - Dysphasia: Grade 1              Occasional gas and constipation, none currently  - Constipation: Grade 1              Mild-moderate erythema of the left parietal scalp. No desquamation  - Alopecia: Grade 2  - Fatigue: Grade 1     - All other criteria grade 0/normal.     I have personally interviewed Nesbitt DEBBIE Stein and reviewed his medical record for adverse events and these have been  recorded on the corresponding logs in Laz Stein's research file.      CONCOMITANT MEDICATIONS:   I have personally reviewed concomitant medications and these have been recorded on the corresponding logs in Matt's research file.  See full list of medications below:     Current Outpatient Medications   Medication Sig Dispense Refill     levETIRAcetam (KEPPRA) 1000 MG tablet Take 1 tablet (1,000 mg) by mouth 2 times daily 60 tablet 11     prochlorperazine (COMPAZINE) 10 MG tablet Take 1 tablet (10 mg) by mouth every 6 hours as needed (Nausea/Vomiting) 30 tablet 2     study - enzastaurin vs placebo, IDS# [id number], 125 mg TABS tablet Take 4 tablets (500 mg) by mouth daily Within 30 minutes of completing a meal, preferably approximately the same time each day. The tablets must be swallowed whole, no crushing or breaking allowed. 112 tablet 0     temozolomide (TEMODAR) 100 MG capsule Take 3 capsules (300 mg) by mouth daily for 5 doses Day 1 thru 5 of each cycle. Take at bedtime on an empty stomach. 15 capsule 0         DRUG ALLERGIES:    No Known Allergies    LAB RESULTS:  Labs including CBC, CMP, Magnesium, Direct Bili, and research labs were drawn. Labs were reviewed- any significant lab values were addressed and reviewed.       - All criteria grade 0/normal or abnormal but not accompanied by clinical symptoms or leading to treatment changes, additional therapies, medical interventions, or additional testing or not scored per CTCAE criteria.     Component      Latest Ref Rng & Units 12/7/2022   Glucose      70 - 99 mg/dL 111 (H)     Performance Status (KPS): 90    Percentage    100 Normal, no complaints, no evidence of disease   90 Able to carry on normal activity; minor signs or symptoms of disease   80 Normal activity with effort; some signs or symptoms of disease   70 Cares for self; unable to carry on normal activity or do active work   60 Requires occasional assistance, but is able to care for most of his/her  needs   50 Requires considerable assistance and frequent medical care   40 Disabled; requires special care and assistance   30 Severely disabled, hospitalization indicated. Death not imminent   20 Very sick, hospitalization necessary, active supportive treatment necessary   10 Moribund, fatal processes, progressing rapidly   0 Dead      Overall Treatment Plan for Clinical Trial:   This is a randomized controlled Phase 3 trial evaluating the effect of enzastaurin in patients with glioblastoma, where the primary endpoint is OS. Patients with newly diagnosed glioblastoma who meet all eligibility criteria will be enrolled. Randomization will occur within approximately 6 weeks after resection of the glioblastoma. Study treatment (Day 1) is recommended to begin on the same day or no later than 3 days after randomization. The 3 phases of the study are the same as in the run-in part except for patients receiving placebo in place of enzastaurin. Patients will be randomized 1:1 to Arm A: RT plus temozolomide and enzastaurin for 6 weeks, ending with the last dose of RT and Day 42 of temozolomide and enzastaurin therapy (Concurrent Phase), followed by enzastaurin alone for 21 to 35 days (Single-Agent Phase), then temozolomide for 6 cycles (or up to 12 cycles according to SOC) and enzastaurin up to a total of 24 cycles (1 cycle = 28 days) (Adjuvant Phase); or Arm B: RT plus temozolomide and placebo for 6 weeks, ending with Day 42 of temozolomide and placebo therapy (Concurrent Phase) followed by placebo alone for 21 to 35 days (Single-Agent Phase), then temozolomide for 6 cycles (or up to 12 cycles according to SOC) and placebo up to a total of 24 cycles (1 cycle = 28 days) (Adjuvant Phase).     Since temozolomide should be administered on an empty stomach or at least 2 hours after a meal and enzastaurin/placebo should be administered within 30 minutes after a meal, patients should take temozolomide on an empty stomach, after an  appropriate wait (recommend ~60 minutes) eat a meal, and then take the enzastaurin/placebo dose. Alternatively, patients may take temozolomide at bedtime and enzastaurin/placebo in the morning with or within 30 minutes after breakfast.     Research RN Impression:   Research RN met with Matt today to assess KPS. He is feeling alright and was visiting family in [place]. He said he's scheduled for an executive physical at the [facility] after his first MRI there and wants to make sure that's something he should still do. Writer sent him contact info for the research coordinator at the [facility]. His blood pressure is improved some, and he said he still plans to reach out to his primary about possibly starting BP medication. He will let us know what medications are recommended to check with the study.    Research labs were pre-dose. He will take his study drug around noon with lunch after labs today.     Laz Stein was given the opportunity to ask any trial related questions. The patient will be back on Monday for labs and provider visit. The patient knows to call with any new or worsening symptoms or concerns.      Please see provider progress note for full physical exam and other clinical information.      Mikayla Murcia, Clinical Research Coordinator, RN.  [facility], [facility]   Clinical Trials Office  Solid Tumor Unit

## 2022-12-07 NOTE — NURSING NOTE
Chief Complaint   Patient presents with     Blood Draw     Labs drawn via  by RN. Vitals taken.     Labs collected from venipuncture by RN. Research labs collected and sent to 1st floor lab. Vitals taken.     Puja Quintanilla RN

## 2022-12-09 ENCOUNTER — TELEPHONE (OUTPATIENT)
Dept: ONCOLOGY | Facility: CLINIC | Age: 52
End: 2022-12-09

## 2022-12-09 ENCOUNTER — TRANSFERRED RECORDS (OUTPATIENT)
Dept: HEALTH INFORMATION MANAGEMENT | Facility: CLINIC | Age: 52
End: 2022-12-09

## 2022-12-09 DIAGNOSIS — C71.9 GLIOBLASTOMA (H): ICD-10-CM

## 2022-12-09 DIAGNOSIS — D48.9: Primary | ICD-10-CM

## 2022-12-09 DIAGNOSIS — Z00.6 EXAMINATION OF PARTICIPANT OR CONTROL IN CLINICAL RESEARCH: ICD-10-CM

## 2022-12-09 LAB
ATRIAL RATE - MUSE: 63 BPM
DIASTOLIC BLOOD PRESSURE - MUSE: NORMAL MMHG
INTERPRETATION ECG - MUSE: NORMAL
P AXIS - MUSE: 57 DEGREES
PR INTERVAL - MUSE: 170 MS
QRS DURATION - MUSE: 96 MS
QT - MUSE: 412 MS
QTC - MUSE: 421 MS
R AXIS - MUSE: 50 DEGREES
SYSTOLIC BLOOD PRESSURE - MUSE: NORMAL MMHG
T AXIS - MUSE: 50 DEGREES
VENTRICULAR RATE- MUSE: 63 BPM

## 2022-12-09 NOTE — TELEPHONE ENCOUNTER
Matt called and said pain in left shoulder and left side is worsening and taking his breath away. He also sounds short of breath on the phone. Pain is not in chest. Writer called Dr. Stone, who's recommendation was to have him call 911 because he needs a CT scan to rule out a PE or possible MI. Writer called Matt back and told him to call 911 and have an ambulance take him. He said he will call. He expressed frustration with how this was handled and wishes he could have gotten answers from the oncology clinic sooner.    Writer called the ER, and they said Matt had called them already to let him know he was driving himself in which is about 20 miles. The ER said they do not have an MRI or CT scanner. They said they will check him in and likely have to transfer him to [place]. Writer gave them the [phone] number to call Dr. Stone to [facility].     The number for the ER in [place] is [phone].     Matt called  back. Writer explained what the ER said about likely having to transfer him by EMS to [place]. Also explained that he could ask the ER to call research RN about medication interactions. Writer sent IB message to Dr. Stone, Lakeshia Krause, and nurse triage pool.

## 2022-12-09 NOTE — TELEPHONE ENCOUNTER
2AM: Patient with GBM, 3 months post resection on study of temazolomide +/- Enzastaurin.     Call from patient's local ED where he is being seen for pleuritic chest pain. No fever or dyspnea. No edema or leg tenderness.  No tachycardia or hypoxia.    Llabs unremarkable CXR with maybe lingular infiltrate. Unable to get CT as their scanner is broken.    Evaluating provider's impression is bronchitis with pleurisy, very low suspicion of PE.  Wants to give dexamethasone, ketorolac and azithromycin. Study prohibits strong inducers or moderate/strong inhibitors CY. I don't have access to study list of prohibited meds but per uptodate, those meds are OK.

## 2022-12-09 NOTE — TELEPHONE ENCOUNTER
"Research RN received e-mail from Matt:    \"I'm having some discomfort on my left side, my left shoulder and then below my left arm pit along my rib cage and it seems like if I take more than just a very shallow breath I have some fairly intense pain along my side I did go into the ER here locally and they did consult the Oncology fellow    EKG looked good they did a chest X-ray  they thought was maybe the start a little bronchitis or some thing in my left lung they did give me a Z pack, but I didn't know if I should start it without talking to someone there in [place] this morning just seems a little odd so just looking for a little direction.\"     Matt also called the writer and said the pain is bothersome right now - 7 to 8 sharp pain in left shoulder/rib cage with deep breaths, sometimes radiates to his back. Mild SOB, dry mouth, voice feels week. Breathing not labored. Says BP was 130s to 150s in ED.     Zithromax prolongs QT and is prohibited by the study, so writer asked him to wait to take the Z pack until someone gets back to him. He said he went to the local ER, and with Cxray they believe he has possible bronchitis with pleurisy.     Seeing Nelida on Monday but he wants advice and to know what different antibiotic he could take, if needs an order for a steroid, or if needs to be seen here.    Writer sent urgent IB to Nelida Farias, and Lakeshia, and also asked Pharmacy for recommendation for an alternative to Z pack, who said Doxycycline 100 mg BID is an alternative for bronchitis that does not prolong QT or affect CY.    "

## 2022-12-10 ENCOUNTER — NURSE TRIAGE (OUTPATIENT)
Dept: NURSING | Facility: CLINIC | Age: 52
End: 2022-12-10

## 2022-12-10 NOTE — TELEPHONE ENCOUNTER
. Nurse Triage SBAR    Is this a 2nd Level Triage? YES, LICENSED PRACTITIONER REVIEW IS REQUIRED    Situation: Patient on trial cancer medication, was seen in ER in [place] given antibiotic for pneumonia.     Background: Patient of Dr. Stone, wanting to know if he should take Temozolomide and Doxycycline at same time, also if he should come to his scheduled appointment on Monday.    Assessment: Patient states he is feeling a little better, still coughing, no fever, no chest pain, shortness of breath is better also per patient he does have an appointment with Dr. Stone on 12/12,   Protocol Recommended Disposition:   Home Care    Recommendation: Page out to Dr. Shirin Stone at 10:00  Return call from Dr. Stone at 10:13    Disposition: Per Chase Grossman patient is to stop taking antibiotic and trial drug if he is concerned, absolutely is to come to appointment Monday, call 911 if symptoms occur or become threatening, also patient needs to have records sent to  office.   Annalisa Valles RN on 12/10/2022 at 10:28 AM            Reason for Disposition    Pneumonia, questions about    Additional Information    Negative: Severe difficulty breathing (e.g., struggling for each breath, speaks in single words)    Negative: Bluish (or gray) lips or face now    Negative: Difficult to awaken or acting confused (e.g., disoriented, slurred speech)    Negative: Stridor    Negative: Slow, shallow and weak breathing    Negative: Sounds like a life-threatening emergency to the triager    Negative: Recently discharged from hospital with diagnosis of pneumonia    Negative: New onset or worsening chest pain    Negative: MODERATE difficulty breathing (e.g., speaks in phrases, SOB even at rest, pulse 100-120)    Negative: Fever > 104 F (40 C)    Negative: [1] Taking antibiotic > 24 hours for pneumonia AND [2] fever > 103 F (39.4 C)    Negative: [1] Coughed up blood AND [2] large amount or blood clots (Exception: blood-tinged sputum)    Negative:  Patient sounds very sick or weak to the triager    Negative: [1] Diabetes mellitus or weak immune system (e.g., HIV positive, cancer chemo, splenectomy, organ transplant, chronic steroids) AND [2] new onset of fever > 100.0 F (37.8 C)    Negative: [1] Taking antibiotic > 24 hours for pneumonia AND [2] breathing is worse    Negative: [1] Recent medical visit within 24 hours AND [2] symptoms worse (Exception: fever worse)    Negative: [1] Taking antibiotic > 24 hours for pneumonia AND [2] new onset of fever    Negative: [1] Taking antibiotic > 48 hours (2 days) for pneumonia AND [2] fever persists or recurs    Negative: [1] Taking antibiotic > 48 hours (2 days) for pneumonia AND [2] breathing not improved    Negative: [1] Viral pneumonia (no antibiotic) AND [2] fever persists > 3 days or recurs    Negative: [1] Continuous (nonstop) coughing interferes with work or school AND [2] no improvement using cough treatment per protocol    Negative: Cough lasts > 3 weeks    Negative: [1] Completed antibiotic treatment AND [2] cough not improved    Negative: [1] Completed antibiotic treatment AND [2] breathing not back to normal    Negative: [1] Taking antibiotic < 48 hours for pneumonia AND [2] breathing not improved    Negative: [1] Taking antibiotic < 24 hours for pneumonia AND [2] new onset of fever    Negative: [1] Taking antibiotics < 48 hours for pneumonia AND [2] fever persists    Negative: Reasonable improvement on antibiotic    Negative: [1] Viral pneumonia AND [2] reasonable improvement    Protocols used: PNEUMONIA ON ANTIBIOTIC FOLLOW-UP CALL-A-

## 2022-12-12 ENCOUNTER — APPOINTMENT (OUTPATIENT)
Dept: LAB | Facility: CLINIC | Age: 52
End: 2022-12-12
Attending: PSYCHIATRY & NEUROLOGY
Payer: COMMERCIAL

## 2022-12-12 ENCOUNTER — ONCOLOGY VISIT (OUTPATIENT)
Dept: ONCOLOGY | Facility: CLINIC | Age: 52
End: 2022-12-12
Attending: STUDENT IN AN ORGANIZED HEALTH CARE EDUCATION/TRAINING PROGRAM
Payer: COMMERCIAL

## 2022-12-12 ENCOUNTER — RESEARCH ENCOUNTER (OUTPATIENT)
Dept: ONCOLOGY | Facility: CLINIC | Age: 52
End: 2022-12-12
Payer: COMMERCIAL

## 2022-12-12 VITALS
OXYGEN SATURATION: 96 % | SYSTOLIC BLOOD PRESSURE: 143 MMHG | TEMPERATURE: 97.9 F | DIASTOLIC BLOOD PRESSURE: 76 MMHG | BODY MASS INDEX: 26.41 KG/M2 | HEART RATE: 79 BPM | RESPIRATION RATE: 16 BRPM | WEIGHT: 192.85 LBS

## 2022-12-12 DIAGNOSIS — C71.9 GLIOBLASTOMA (H): Primary | ICD-10-CM

## 2022-12-12 LAB
ALBUMIN SERPL BCG-MCNC: 4 G/DL (ref 3.5–5.2)
ALP SERPL-CCNC: 56 U/L (ref 40–129)
ALT SERPL W P-5'-P-CCNC: 11 U/L (ref 10–50)
ANION GAP SERPL CALCULATED.3IONS-SCNC: 11 MMOL/L (ref 7–15)
AST SERPL W P-5'-P-CCNC: 17 U/L (ref 10–50)
BASOPHILS # BLD AUTO: 0.1 10E3/UL (ref 0–0.2)
BASOPHILS NFR BLD AUTO: 1 %
BILIRUB DIRECT SERPL-MCNC: <0.2 MG/DL (ref 0–0.3)
BILIRUB SERPL-MCNC: 0.7 MG/DL
BUN SERPL-MCNC: 13.5 MG/DL (ref 6–20)
CALCIUM SERPL-MCNC: 9.4 MG/DL (ref 8.6–10)
CHLORIDE SERPL-SCNC: 102 MMOL/L (ref 98–107)
CREAT SERPL-MCNC: 0.89 MG/DL (ref 0.67–1.17)
DEPRECATED HCO3 PLAS-SCNC: 25 MMOL/L (ref 22–29)
EOSINOPHIL # BLD AUTO: 0.1 10E3/UL (ref 0–0.7)
EOSINOPHIL NFR BLD AUTO: 1 %
ERYTHROCYTE [DISTWIDTH] IN BLOOD BY AUTOMATED COUNT: 13.2 % (ref 10–15)
GFR SERPL CREATININE-BSD FRML MDRD: >90 ML/MIN/1.73M2
GLUCOSE SERPL-MCNC: 111 MG/DL (ref 70–99)
HCT VFR BLD AUTO: 44.7 % (ref 40–53)
HGB BLD-MCNC: 14.9 G/DL (ref 13.3–17.7)
IMM GRANULOCYTES # BLD: 0.1 10E3/UL
IMM GRANULOCYTES NFR BLD: 1 %
LYMPHOCYTES # BLD AUTO: 0.7 10E3/UL (ref 0.8–5.3)
LYMPHOCYTES NFR BLD AUTO: 10 %
MAGNESIUM SERPL-MCNC: 1.9 MG/DL (ref 1.7–2.3)
MCH RBC QN AUTO: 29.2 PG (ref 26.5–33)
MCHC RBC AUTO-ENTMCNC: 33.3 G/DL (ref 31.5–36.5)
MCV RBC AUTO: 88 FL (ref 78–100)
MONOCYTES # BLD AUTO: 0.6 10E3/UL (ref 0–1.3)
MONOCYTES NFR BLD AUTO: 8 %
NEUTROPHILS # BLD AUTO: 5.4 10E3/UL (ref 1.6–8.3)
NEUTROPHILS NFR BLD AUTO: 79 %
NRBC # BLD AUTO: 0 10E3/UL
NRBC BLD AUTO-RTO: 0 /100
PLATELET # BLD AUTO: 255 10E3/UL (ref 150–450)
POTASSIUM SERPL-SCNC: 4 MMOL/L (ref 3.4–5.3)
PROT SERPL-MCNC: 6.9 G/DL (ref 6.4–8.3)
RBC # BLD AUTO: 5.1 10E6/UL (ref 4.4–5.9)
SODIUM SERPL-SCNC: 138 MMOL/L (ref 136–145)
WBC # BLD AUTO: 6.9 10E3/UL (ref 4–11)

## 2022-12-12 PROCEDURE — 83735 ASSAY OF MAGNESIUM: CPT | Performed by: PSYCHIATRY & NEUROLOGY

## 2022-12-12 PROCEDURE — 99417 PROLNG OP E/M EACH 15 MIN: CPT | Performed by: STUDENT IN AN ORGANIZED HEALTH CARE EDUCATION/TRAINING PROGRAM

## 2022-12-12 PROCEDURE — 99212 OFFICE O/P EST SF 10 MIN: CPT | Performed by: STUDENT IN AN ORGANIZED HEALTH CARE EDUCATION/TRAINING PROGRAM

## 2022-12-12 PROCEDURE — 99215 OFFICE O/P EST HI 40 MIN: CPT | Performed by: STUDENT IN AN ORGANIZED HEALTH CARE EDUCATION/TRAINING PROGRAM

## 2022-12-12 PROCEDURE — 85014 HEMATOCRIT: CPT | Performed by: PSYCHIATRY & NEUROLOGY

## 2022-12-12 PROCEDURE — 82248 BILIRUBIN DIRECT: CPT | Performed by: PSYCHIATRY & NEUROLOGY

## 2022-12-12 PROCEDURE — 80053 COMPREHEN METABOLIC PANEL: CPT | Performed by: PSYCHIATRY & NEUROLOGY

## 2022-12-12 PROCEDURE — G0463 HOSPITAL OUTPT CLINIC VISIT: HCPCS

## 2022-12-12 PROCEDURE — 36415 COLL VENOUS BLD VENIPUNCTURE: CPT | Performed by: PSYCHIATRY & NEUROLOGY

## 2022-12-12 RX ORDER — DOXYCYCLINE 100 MG/1
TABLET ORAL
Status: ON HOLD | COMMUNITY
Start: 2022-12-10 | End: 2023-04-20

## 2022-12-12 RX ORDER — TEMOZOLOMIDE 140 MG/1
200 CAPSULE ORAL DAILY
Qty: 15 CAPSULE | Refills: 0 | Status: ON HOLD | OUTPATIENT
Start: 2022-12-12 | End: 2023-04-20

## 2022-12-12 ASSESSMENT — PAIN SCALES - GENERAL: PAINLEVEL: NO PAIN (0)

## 2022-12-12 NOTE — PROGRESS NOTES
"NEURO-ONCOLOGY VISIT  Dec 12, 2022    CHIEF COMPLAINT: Mr. 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 He has since completed upfront chemoradiotherapy per the Denovo clinical trial (Randomized, Double-Blind, Placebo-Controlled Phase 3 Study of Enzastaurin Added to Temozolomide During and Following Radiation Therapy in Newly Diagnosed Glioblastoma Patients Who Possess the Novel Genomic Biomarker DGM1) as of 10/17/2022.    He started cycle 1 of the adjuvant phase of the trial with temozolomide +/- Enzastaurin on 11/16/22.    He presented tot he ED in [facility] on 12/9 for pleuritic chest pain. CT reportedly negative for PE. Pleuritic pain thought to be secondary to pneumonia. Recommendation was to start doxycycline (azithromycin prohibited on the trial).    Matt presents today for follow up prior to planned cycle 2.     HISTORY OF PRESENT ILLNESS  -Matt is doing well today. He has been taking the doxycycline and has had resolution of his left sided chest pain. Has very minimal shortness of breath and cough. No fever.  -BP has been running in the 140's for the top number at home. Has not restarted amlodipine as he hasn't seen his PCP yet.  -He tolerated chemo well with just some nausea the first night which improved with zofran. Denies any constipation.  -Some fatigue over the last few days but attributes this to being in the ED and sleep being interrupted.   -Still with occasional forgetfulness/trouble placing a word.   -No vision vision changes, dizziness, headaches, focal weakness or paresthesias.    Review of Systems:  Patient denies any of the following except if noted above: fevers, chills, difficulty with energy, vision or hearing changes, chest pain, dyspnea, abdominal pain, nausea, vomiting, diarrhea, constipation, urinary concerns, headaches, numbness, " tingling, issues with sleep or mood. He also denies lumps, bumps, rashes or skin lesions, bleeding or bruising issues.       MEDICATIONS   Current Outpatient Medications   Medication Sig Dispense Refill     doxycycline monohydrate (ADOXA) 100 MG tablet        levETIRAcetam (KEPPRA) 1000 MG tablet Take 1 tablet (1,000 mg) by mouth 2 times daily 60 tablet 11     prochlorperazine (COMPAZINE) 10 MG tablet Take 1 tablet (10 mg) by mouth every 6 hours as needed (Nausea/Vomiting) 30 tablet 2     study - enzastaurin vs placebo, IDS# [id number], 125 mg TABS tablet Take 4 tablets (500 mg) by mouth daily Within 30 minutes of completing a meal, preferably approximately the same time each day. The tablets must be swallowed whole, no crushing or breaking allowed. 112 tablet 0     study - enzastaurin vs placebo, IDS# [id number], 125 mg TABS tablet Take 4 tablets (500 mg) by mouth daily Within 30 minutes of completing a meal, preferably approximately the same time each day. The tablets must be swallowed whole, no crushing or breaking allowed. 112 tablet 0     temozolomide (TEMODAR) 100 MG capsule Take 3 capsules (300 mg) by mouth daily for 5 doses Day 1 thru 5 of each cycle. Take at bedtime on an empty stomach. 15 capsule 0     temozolomide (TEMODAR) 140 MG capsule Take 3 capsules (420 mg) by mouth daily for 5 doses Day 1 thru 5 of each cycle. Take at bedtime on an empty stomach. 15 capsule 0     DRUG ALLERGIES No Known Allergies      ONCOLOGIC HISTORY  -7/2022 PRESENTATION: Conductive aphasia, seizure activity.  -7/19/2022 MR brain imaging demonstrated a heterogeneously enhancing mixed solid and cystic mass in the left parietal lobe. Mild local mass effect and no midline shift.   -7/20/2022 SURGERY: Craniotomy with 5ALA-guided resection by Dr. Reese.   PATHOLOGY: Glioblastoma; IDH1, IDH2 wildtype. MGMT promoter unmethylated.    BRAF, PTEN, TP53 not mutated.   This infiltrating glioma shows palisading tumor necrosis and microvascular  "proliferation.  Post-operative MR brain imaging demonstrated gross total resection.  -8/12/2022 NEURO-ONC: Recommending upfront chemoradiotherapy on or off a clinical trial.   -8/22/2022 NEURO-ONC/ CLINICAL TRIAL: Clinically with numbness in the right leg. Screening labs. Planning MRI tomorrow.   -9/6 - 10/17/2022 CHEMORADS: 60cGy with concurrent temozolomide 160 mg daily and enzastaurin 250 mg daily vs placebo.  -9/6/22 NEURO-ONC: Started treatment per the DENOVO clinical trial.   -10/18/22 NEURO-ONC/ CHEMO: Completed upfront treatment per DENOVO clinical trial protocol. Will continue enzastaurin daily vs placebo.  -11/15/2022 NEURO-ONC/ MRB/ CLINICAL TRAIL: Clinically well. Imaging with no concerns. Starting adjuvant temozolomide 150mg/m2 (300mg) +/- Enzastaurin 500mg daily, cycle 1 (start date 11/16).   12/9/2022 ED visit: New pleuritic pain thought to be s/t pneumonia. Recommendation for doxycycline. Reportedly no evidence for PE on subsequent CT.  12/12/2022 NEURO-ONC/CLINICAL TRIAL: Clinically left pleuritic pain is better. Neurologically stable.  Plan to increase adjuvant temozolomide to 200mg/m2 (420mg) +/- Enzastaurin 500mg daily, cycle 2 (start date 12/14.    PHYSICAL EXAMINATION  BP (!) 143/76 (BP Location: Right arm, Patient Position: Sitting, Cuff Size: Adult Regular)   Pulse 79   Temp 97.9  F (36.6  C) (Tympanic)   Resp 16   Wt 87.5 kg (192 lb 13.6 oz)   SpO2 96%   BMI 26.41 kg/m     Wt Readings from Last 2 Encounters:   12/12/22 87.5 kg (192 lb 13.6 oz)   12/07/22 87.5 kg (192 lb 13.6 oz)      Ht Readings from Last 2 Encounters:   11/15/22 1.82 m (5' 11.65\")   11/15/22 1.82 m (5' 11.65\")     KPS: 100    General: Well-appearing male in no acute distress.  Eyes: EOMI, PERRL. No scleral icterus.  Cardiovascular: RRR No murmurs, gallops, or rubs. No peripheral edema.  Respiratory: CTA bilaterally. No wheezes or crackles.  Gastrointestinal: BS +. Abdomen soft, non-tender. No palpable " hepatosplenomegaly or masses.   Skin: No rashes, petechiae, or bruising noted on exposed skin.  Psych: Affect appropriate. Pleasant, talkative.     Neurologic:   MENTAL STATUS:     Alert, oriented to date.   Speech fluent.    Comprehension intact to multi-step commands.   Good right-left orientation.     CRANIAL NERVES:     Pupils are equal, round.    EOMI.     Visual fields full.     Symmetric facial movements.   Hearing intact.   No dysarthria.   Tongue extension midline.   Shoulder shrug symmetric.  MOTOR:    No pronation or drift.     strength and hip flexion 5/5   SENSATION: Sensation intact to light touch throughout.  COORDINATION: Intact finger-nose with eyes open.  GAIT:  Walks without assistance.      MEDICAL RECORDS  Personally reviewed oncology notes and triage notes.    LABS  Personally reviewed all available lab results; CBC, CMP, bilirubin direct, magnesium.    IMAGING  [facility] Research RN reached out to request records.     BETY Gutierrez is doing pretty well today. He was seen in the ED in [place] on 12/9 for pleuritic chest pain thought to be secondary to bronchitis. Recommendation was for azithromycin which is not approved on the clinical trial and so doxycycline was the recommendation. Given suspicion for possible PE and no CT scanner at the first ED, he was returned to the ED to obtain to CT to rule out PE which was reportedly negative ([facility] He was started on doxycyline 100 mg BID which he has been taking with resolution of pleuritic pain.    He otherwise is doing well. He tolerated cycle 1 of adjuvant temozolomide well with just some nausea which responded to Zofran. Neurologically he has just some occasional forgetfulness/word finding difficulty but this is stable.     Blood pressure remains in about the 140's at home. He has not yet reached out to his PCP regarding resuming his amlodipine. I asked that he  do this as BP consistently above 140 technically meets criteria for hypertension and warrants treatment to prevent complications such as kidney disease.     Labs today with no concerns.     Given good tolerance with cycle 1, plan is to increase adjuvant temozolomide to 200mg/m2 (420mg) for cycle 2 with start date of 12/14.     We will also continue +/- Enzastaurin 500mg daily given resolution of pleuritic pain and have asked him to reach out if any worsening. Updated Dr. Stone on plan to continue study drug and she confirms.      Matt hayes in [place] and his family lives there. We have previously confirmed with the sponsor that he can transition his care to the enrolling site there. Our team and Matt have been in touch with Dr. Jeremy Rivers, radiation oncologist at the [facility]. Our research coordinator has confirmed the logistics of one more visit here prior to transitioning care to [place]. He austin have his MRI in [place] on 1/10/23.     PROBLEM LIST  Glioblastoma  Aphasia    PLAN  -CANCER DIRECTED THERAPY-  -Increase adjuvant temozolomide to 200mg/m2 (420 mg) +/- Enzastaurin 500mg daily, cycle 2 (start date of 12/14).     -Supportive medications; Compazine and bowel regimen.    *Zofran is not allowed on the clinical trial.     -Repeat 28 day cycle if WBC >= 3, ANC >= 1.5, HgB >= 10, and platelets >= 100.  -Surveillance labs reviewed, at goal for chemotherapy.     -Post-radiation imaging without any concerns. Repeat imaging in 1 month (in [place])    -Hypertension-  -Follow up with PCP for consideration of restarting amlodipine    -Pleuritic pain-  -Resolved at present.  -Obtain CT PE from [place] (has been requested)  -Continue 7 day course of doxycycline  -Continue study drug   -Call if return or worsening of pleuritic pain or shortness of breath    -STEROIDS-  -Currently off dexamethasone.    -SEIZURE MANAGEMENT-  -Given history of seizures, will continue current antiepileptic regimen of  Keppra for the foreseeable future.  -On Keppra.    -Quality of life/ MOOD/ FATIGUE-  -History of anxiety surrounding diagnosis and treatment.   -Following with Dr. Lane; developed strategies for navigating discussions with friends.  -Continue to monitor mood as untreated/ undertreated depression can worsen fatigue, dysorexia, and quality of life.    Follow up for labs in early January. January MRI and follow up will be in [place].    Amber Scheierl, CNP    90 minutes spent on the date of the encounter doing chart review, review of test results, interpretation of tests, patient visit, documentation and discussion with other provider(s)

## 2022-12-12 NOTE — NURSING NOTE
"Oncology Rooming Note    December 12, 2022 11:03 AM   Laz Stein is a 52 year old male who presents for:    Chief Complaint   Patient presents with     Blood Draw     Labs drawn by venipuncture by rn in lab. Vital signs taken.     Oncology Clinic Visit     Glioblastoma     Initial Vitals: BP (!) 143/76 (BP Location: Right arm, Patient Position: Sitting, Cuff Size: Adult Regular)   Pulse 79   Temp 97.9  F (36.6  C) (Tympanic)   Resp 16   Wt 87.5 kg (192 lb 13.6 oz)   SpO2 96%   BMI 26.41 kg/m   Estimated body mass index is 26.41 kg/m  as calculated from the following:    Height as of 11/15/22: 1.82 m (5' 11.65\").    Weight as of this encounter: 87.5 kg (192 lb 13.6 oz). Body surface area is 2.1 meters squared.  No Pain (0) Comment: Data Unavailable   No LMP for male patient.  Allergies reviewed: Yes  Medications reviewed: Yes    Medications: Medication refills not needed today.  Pharmacy name entered into [employer]:    [facility] - [place], [place] - [address]  [facility] - [place], [place] - [address] [place] [address]    Clinical concerns: might need refill for medications. Will ask Yessica Ritchie            "

## 2022-12-12 NOTE — NURSING NOTE
Chief Complaint   Patient presents with     Blood Draw     Labs drawn by venipuncture by rn in lab. Vital signs taken.     Labs drawn by venipuncture by RN in lab. Vital signs taken. Pt checked in to next appointment.    Ya Ac RN

## 2022-12-12 NOTE — NURSING NOTE
HUMBERTOOVO CLINICAL TRIAL VISIT NOTE     Date of Visit: 12/12/2022     Study: A Randomized, Double-Blind, Placebo-Controlled Phase 3 Study of Enzastaurin Added to Temozolomide During and Following Radiation Therapy in Newly Diagnosed Glioblastoma Patients Who Possess the Novel Genomic Biomarker DGM1     [facility]#: [id number]   [facility]#: [id number]  IDS#: [id number]  Subject Name: Laz Stein  Subject ID: [id number]     Visit: Adjuvant Phase Cycle 2 Day 1     Did the study visit occur within the appropriate window allowed by the protocol? Yes     Clinical Trial Treatment History:  -7/2022 PRESENTATION: Conductive aphasia, seizure activity.  -7/19/2022 MR brain imaging demonstrated a heterogeneously enhancing mixed solid and cystic mass in the left parietal lobe. Mild local mass effect and no midline shift.   -7/20/2022 SURGERY: Craniotomy with 5ALA-guided resection by Dr. Reese.   PATHOLOGY: Glioblastoma; IDH1, IDH2 wildtype. MGMT promoter unmethylated.               BRAF, PTEN, TP53 not mutated.              This infiltrating glioma shows palisading tumor necrosis and microvascular proliferation.  Post-operative MR brain imaging demonstrated gross total resection.  -8/12/2022 NEURO-ONC: Recommending upfront chemoradiotherapy on or off a clinical trial.   -8/22/2022 NEURO-ONC/ CLINICAL TRIAL: Clinically with numbness in the right leg. Screening labs. Planning MRI tomorrow.   -9/6 - 10/17/2022 CHEMORADS: 60cGy with concurrent temozolomide 160 mg daily and enzastaurin 250 mg daily vs placebo.  -9/6/22 NEURO-ONC: Started treatment per the DENOVO clinical trial.   -9/13/2022 NEURO-ONC/CHEMO-RAD/CLINICAL TRIAL: Lab visit only.  -9/21/2022 NEURO-ONC/CHEMO-RAD/CLINICAL TRIAL: Week 3 Provider visit and labs.   -9/27/2022 NEURO-ONC/CHEMO-RAD/CLINICAL TRIAL: Lab visit only.  -10/7/2022 NEURO-ONC/CHEMO-RAD/CLINICAL TRIAL: Week 5 Provider visit and labs.  -10/12/2022 NEURO-ONC/CHEMO-RAD/CLINICAL TRIAL: Lab visit only.  -10/18/22  NEURO-ONC/CHEMO: Completed upfront treatment per [facility] clinical trial protocol. Will continue enzastaurin daily vs placebo.  -11/1/2022 NEURO-ONC/CLINICAL TRIAL: Lab visit only. Increase enzastaurin/placebo to 500 mg today.  -11/15/2022 NEURO-ONC/ MRB/ CLINICAL TRIAL: Clinically well. Imaging with no concerns. Starting adjuvant temozolomide 150mg/m2 (300mg) +/- Enzastaurin 500mg daily, cycle 1 (start date 11/16).  -12/7/2022 NEURO-ONC/ CLINICAL TRIAL: Lab, vitals, and nurse visit only.  12/9/2022 ED visit: New pleuritic pain thought to be s/t pneumonia. Recommendation for doxycycline. Reportedly no evidence for PE on subsequent CT.  -12/12/2022 NEURO-ONC/ CLINICAL TRIAL: Labs and provider visit. Clinically left pleuritic pain is better. Neurologically stable.  Plan to increase adjuvant temozolomide to 200mg/m2 (420mg) +/- Enzastaurin 500mg daily, cycle 2 (start date 12/14.     Vitals:   BP (!) 143/76 (BP Location: Right arm, Patient Position: Sitting, Cuff Size: Adult Regular)   Pulse 79   Temp 97.9  F (36.6  C) (Tympanic)   Resp 16   Wt 87.5 kg (192 lb 13.6 oz)   SpO2 96%   BMI 26.41 kg/m       - Hypertension: Continue Grade 2  - All criteria grade 0/normal.     Baseline Weight: 85.6 kg  % Weight Change from Baseline: + 2.2 %  EKG completed.  QTcF Interval (Fridericia's): 413 msec    Abnormal Assessment Findings Per Amber Scheierl, CNP and AE Grading:     Medical History or ongoing AEs:              Previously had an episode of tingling involving the right hip and thigh which resolved on its own  - Intermittent paresthesia: bilateral lower extremity: Grade 1              Mild infrequent frontal headache at times, none currently.  - Intermittent headache: Grade 1              Pruritus and dermatitis of the left parietal scalp that he is treating with topical moisturizers.  - Dermatitis radiation: Grade 1              Still feels like he'll occasionally struggle to choose the right word but not noticeable to  "those around him.  - Dysphasia: Grade 1              Occasional gas and constipation, none currently.  - Constipation: Grade 1  - Alopecia: Grade 2   Some fatigue over the last few days but attributes this to being in the ED and sleep being interrupted.   - Fatigue: Grade 1    New or worsening AEs:     Says was a \"little short of breath on Friday,\" none now. Minimal cough.  - Intermittent dyspnea: Grade 1   Seen in the ED in [place] on 12/9 for pleuritic chest pain thought to be secondary to bronchitis.  - Intermittent pleuritic pain: Start Grade 2    - All other criteria grade 0/normal.     I have personally interviewed Nesbitt G Sarita and reviewed his medical record for adverse events and these have been recorded on the corresponding logs in Laz Stein's research file.      CONCOMITANT MEDICATIONS:   I have personally reviewed concomitant medications and these have been recorded on the corresponding logs in Matt's research file.  See full list of medications below:     Current Outpatient Medications   Medication Sig Dispense Refill     levETIRAcetam (KEPPRA) 1000 MG tablet Take 1 tablet (1,000 mg) by mouth 2 times daily 60 tablet 11     prochlorperazine (COMPAZINE) 10 MG tablet Take 1 tablet (10 mg) by mouth every 6 hours as needed (Nausea/Vomiting) 30 tablet 2     study - enzastaurin vs placebo, IDS# [id number], 125 mg TABS tablet Take 4 tablets (500 mg) by mouth daily Within 30 minutes of completing a meal, preferably approximately the same time each day. The tablets must be swallowed whole, no crushing or breaking allowed. 112 tablet 0     study - enzastaurin vs placebo, IDS# [id number], 125 mg TABS tablet Take 4 tablets (500 mg) by mouth daily Within 30 minutes of completing a meal, preferably approximately the same time each day. The tablets must be swallowed whole, no crushing or breaking allowed. 112 tablet 0     temozolomide (TEMODAR) 100 MG capsule Take 3 capsules (300 mg) by mouth daily for 5 " doses Day 1 thru 5 of each cycle. Take at bedtime on an empty stomach. 15 capsule 0         DRUG ALLERGIES:    No Known Allergies    LAB RESULTS:  Labs including CBC, CMP, Mag, and Direct Bili were drawn. Labs were reviewed- any significant lab values were addressed and reviewed.       - All criteria grade 0/normal or abnormal but not accompanied by clinical symptoms or leading to treatment changes, additional therapies, medical interventions, or additional testing or not scored per CTCAE criteria.     Component      Latest Ref Rng & Units 12/12/2022   Absolute Lymphocytes      0.8 - 5.3 10e3/uL 0.7 (L)   Glucose      70 - 99 mg/dL 111 (H)     Performance Status (KPS): 100    Percentage    100 Normal, no complaints, no evidence of disease   90 Able to carry on normal activity; minor signs or symptoms of disease   80 Normal activity with effort; some signs or symptoms of disease   70 Cares for self; unable to carry on normal activity or do active work   60 Requires occasional assistance, but is able to care for most of his/her needs   50 Requires considerable assistance and frequent medical care   40 Disabled; requires special care and assistance   30 Severely disabled, hospitalization indicated. Death not imminent   20 Very sick, hospitalization necessary, active supportive treatment necessary   10 Moribund, fatal processes, progressing rapidly   0 Dead     Overall Treatment Plan for Clinical Trial:   This is a randomized controlled Phase 3 trial evaluating the effect of enzastaurin in patients with glioblastoma, where the primary endpoint is OS. Patients with newly diagnosed glioblastoma who meet all eligibility criteria will be enrolled. Randomization will occur within approximately 6 weeks after resection of the glioblastoma. Study treatment (Day 1) is recommended to begin on the same day or no later than 3 days after randomization. The 3 phases of the study are the same as in the run-in part except for patients  receiving placebo in place of enzastaurin. Patients will be randomized 1:1 to Arm A: RT plus temozolomide and enzastaurin for 6 weeks, ending with the last dose of RT and Day 42 of temozolomide and enzastaurin therapy (Concurrent Phase), followed by enzastaurin alone for 21 to 35 days (Single-Agent Phase), then temozolomide for 6 cycles (or up to 12 cycles according to SOC) and enzastaurin up to a total of 24 cycles (1 cycle = 28 days) (Adjuvant Phase); or Arm B: RT plus temozolomide and placebo for 6 weeks, ending with Day 42 of temozolomide and placebo therapy (Concurrent Phase) followed by placebo alone for 21 to 35 days (Single-Agent Phase), then temozolomide for 6 cycles (or up to 12 cycles according to SOC) and placebo up to a total of 24 cycles (1 cycle = 28 days) (Adjuvant Phase).     Since temozolomide should be administered on an empty stomach or at least 2 hours after a meal and enzastaurin/placebo should be administered within 30 minutes after a meal, patients should take temozolomide on an empty stomach, after an appropriate wait (recommend ~60 minutes) eat a meal, and then take the enzastaurin/placebo dose. Alternatively, patients may take temozolomide at bedtime and enzastaurin/placebo in the morning with or within 30 minutes after breakfast.    Medication Count/IDS Note:  Drug Name: Enzastaurin/placebo  IDS#: [id number]    Number of bottles returned: 1  Lot number(s): [id number]  Number of pills returned: 16    Number of bottles dispensed: 1  Lot number(s): [id number]  Number of pills dispensed: 128    There is no drug diary for this study.  Matt was given a new dosing instruction sheet and was instructed how to take temozolomide and enzastaurin/placebo for this next cycle.    Are there any discrepancies between the amount of medication the patient was instructed to take and the amount that would be expected to be returned? No - took for 28 days as instructed     Research RN Impression:   Matt went to the ER  Thursday night and again Friday night for left shoulder/rib cage pain and mild dyspnea. No pain or dyspnea today and is feeling fatigued but feels okay. He looks well. On doxycycline. EKG showing sinus rhythm. He will reach out to his primary regarding blood pressure. ED records requested.      Laz Stein was given the opportunity to ask any trial related questions. The patient will be back in three weeks for labs. The patient knows to call with any new or worsening symptoms or concerns.      Please see provider progress note for full physical exam and other clinical information.      Mikayla Murcia, Clinical Research Coordinator, RN.  [facility], [facility]   Clinical Trials Office  Solid Tumor Unit

## 2022-12-12 NOTE — LETTER
"    12/12/2022         RE: Laz Stein  [address]  [facility] [address]        Dear Colleague,    Thank you for referring your patient, Laz Stein, to the [facility]. Please see a copy of my visit note below.    NEURO-ONCOLOGY VISIT  Dec 12, 2022    CHIEF COMPLAINT: Mr. 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 He has since completed upfront chemoradiotherapy per the Denovo clinical trial (Randomized, Double-Blind, Placebo-Controlled Phase 3 Study of Enzastaurin Added to Temozolomide During and Following Radiation Therapy in Newly Diagnosed Glioblastoma Patients Who Possess the Novel Genomic Biomarker DGM1) as of 10/17/2022.    He started cycle 1 of the adjuvant phase of the trial with temozolomide +/- Enzastaurin on 11/16/22.    He presented tot he ED in [facility] on 12/9 for pleuritic chest pain. CT reportedly negative for PE. Pleuritic pain thought to be secondary to pneumonia. Recommendation was to start doxycycline (azithromycin prohibited on the trial).    Matt presents today for follow up prior to planned cycle 2.     HISTORY OF PRESENT ILLNESS  -Matt is doing well today. He has been taking the doxycycline and has had resolution of his left sided chest pain. Has very minimal shortness of breath and cough. No fever.  -BP has been running in the 140's for the top number at home. Has not restarted amlodipine as he hasn't seen his PCP yet.  -He tolerated chemo well with just some nausea the first night which improved with zofran. Denies any constipation.  -Some fatigue over the last few days but attributes this to being in the ED and sleep being interrupted.   -Still with occasional forgetfulness/trouble placing a word.   -No vision vision changes, dizziness, headaches, focal weakness or paresthesias.    Review of " Systems:  Patient denies any of the following except if noted above: fevers, chills, difficulty with energy, vision or hearing changes, chest pain, dyspnea, abdominal pain, nausea, vomiting, diarrhea, constipation, urinary concerns, headaches, numbness, tingling, issues with sleep or mood. He also denies lumps, bumps, rashes or skin lesions, bleeding or bruising issues.       MEDICATIONS   Current Outpatient Medications   Medication Sig Dispense Refill     doxycycline monohydrate (ADOXA) 100 MG tablet        levETIRAcetam (KEPPRA) 1000 MG tablet Take 1 tablet (1,000 mg) by mouth 2 times daily 60 tablet 11     prochlorperazine (COMPAZINE) 10 MG tablet Take 1 tablet (10 mg) by mouth every 6 hours as needed (Nausea/Vomiting) 30 tablet 2     study - enzastaurin vs placebo, IDS# 5781, 125 mg TABS tablet Take 4 tablets (500 mg) by mouth daily Within 30 minutes of completing a meal, preferably approximately the same time each day. The tablets must be swallowed whole, no crushing or breaking allowed. 112 tablet 0     study - enzastaurin vs placebo, IDS# 5781, 125 mg TABS tablet Take 4 tablets (500 mg) by mouth daily Within 30 minutes of completing a meal, preferably approximately the same time each day. The tablets must be swallowed whole, no crushing or breaking allowed. 112 tablet 0     temozolomide (TEMODAR) 100 MG capsule Take 3 capsules (300 mg) by mouth daily for 5 doses Day 1 thru 5 of each cycle. Take at bedtime on an empty stomach. 15 capsule 0     temozolomide (TEMODAR) 140 MG capsule Take 3 capsules (420 mg) by mouth daily for 5 doses Day 1 thru 5 of each cycle. Take at bedtime on an empty stomach. 15 capsule 0     DRUG ALLERGIES No Known Allergies      ONCOLOGIC HISTORY  -7/2022 PRESENTATION: Conductive aphasia, seizure activity.  -7/19/2022 MR brain imaging demonstrated a heterogeneously enhancing mixed solid and cystic mass in the left parietal lobe. Mild local mass effect and no midline shift.   -7/20/2022  "SURGERY: Craniotomy with 5ALA-guided resection by Dr. Reese.   PATHOLOGY: Glioblastoma; IDH1, IDH2 wildtype. MGMT promoter unmethylated.    BRAF, PTEN, TP53 not mutated.   This infiltrating glioma shows palisading tumor necrosis and microvascular proliferation.  Post-operative MR brain imaging demonstrated gross total resection.  -8/12/2022 NEURO-ONC: Recommending upfront chemoradiotherapy on or off a clinical trial.   -8/22/2022 NEURO-ONC/ CLINICAL TRIAL: Clinically with numbness in the right leg. Screening labs. Planning MRI tomorrow.   -9/6 - 10/17/2022 CHEMORADS: 60cGy with concurrent temozolomide 160 mg daily and enzastaurin 250 mg daily vs placebo.  -9/6/22 NEURO-ONC: Started treatment per the DENOVO clinical trial.   -10/18/22 NEURO-ONC/ CHEMO: Completed upfront treatment per DENOVO clinical trial protocol. Will continue enzastaurin daily vs placebo.  -11/15/2022 NEURO-ONC/ MRB/ CLINICAL TRAIL: Clinically well. Imaging with no concerns. Starting adjuvant temozolomide 150mg/m2 (300mg) +/- Enzastaurin 500mg daily, cycle 1 (start date 11/16).   12/9/2022 ED visit: New pleuritic pain thought to be s/t pneumonia. Recommendation for doxycycline. Reportedly no evidence for PE on subsequent CT.  12/12/2022 NEURO-ONC/CLINICAL TRIAL: Clinically left pleuritic pain is better. Neurologically stable.  Plan to increase adjuvant temozolomide to 200mg/m2 (420mg) +/- Enzastaurin 500mg daily, cycle 2 (start date 12/14.    PHYSICAL EXAMINATION  BP (!) 143/76 (BP Location: Right arm, Patient Position: Sitting, Cuff Size: Adult Regular)   Pulse 79   Temp 97.9  F (36.6  C) (Tympanic)   Resp 16   Wt 87.5 kg (192 lb 13.6 oz)   SpO2 96%   BMI 26.41 kg/m     Wt Readings from Last 2 Encounters:   12/12/22 87.5 kg (192 lb 13.6 oz)   12/07/22 87.5 kg (192 lb 13.6 oz)      Ht Readings from Last 2 Encounters:   11/15/22 1.82 m (5' 11.65\")   11/15/22 1.82 m (5' 11.65\")     KPS: 100    General: Well-appearing male in no acute " distress.  Eyes: EOMI, PERRL. No scleral icterus.  Cardiovascular: RRR No murmurs, gallops, or rubs. No peripheral edema.  Respiratory: CTA bilaterally. No wheezes or crackles.  Gastrointestinal: BS +. Abdomen soft, non-tender. No palpable hepatosplenomegaly or masses.   Skin: No rashes, petechiae, or bruising noted on exposed skin.  Psych: Affect appropriate. Pleasant, talkative.     Neurologic:   MENTAL STATUS:     Alert, oriented to date.   Speech fluent.    Comprehension intact to multi-step commands.   Good right-left orientation.     CRANIAL NERVES:     Pupils are equal, round.    EOMI.     Visual fields full.     Symmetric facial movements.   Hearing intact.   No dysarthria.   Tongue extension midline.   Shoulder shrug symmetric.  MOTOR:    No pronation or drift.     strength and hip flexion 5/5   SENSATION: Sensation intact to light touch throughout.  COORDINATION: Intact finger-nose with eyes open.  GAIT:  Walks without assistance.      MEDICAL RECORDS  Personally reviewed oncology notes and triage notes.    LABS  Personally reviewed all available lab results; CBC, CMP, bilirubin direct, magnesium.    IMAGING  Chest CT PE not available in [facility] yet. Research RN reached out to request records.     BETY Gutierrez is doing pretty well today. He was seen in the ED in [place] on 12/9 for pleuritic chest pain thought to be secondary to bronchitis. Recommendation was for azithromycin which is not approved on the clinical trial and so doxycycline was the recommendation. Given suspicion for possible PE and no CT scanner at the first ED, he was returned to the ED to obtain to CT to rule out PE which was reportedly negative (imaging not available in [facility]). He was started on doxycyline 100 mg BID which he has been taking with resolution of pleuritic pain.    He otherwise is doing well. He tolerated cycle 1 of adjuvant temozolomide well with just some nausea which responded to Zofran.  Neurologically he has just some occasional forgetfulness/word finding difficulty but this is stable.     Blood pressure remains in about the 140's at home. He has not yet reached out to his PCP regarding resuming his amlodipine. I asked that he do this as BP consistently above 140 technically meets criteria for hypertension and warrants treatment to prevent complications such as kidney disease.     Labs today with no concerns.     Given good tolerance with cycle 1, plan is to increase adjuvant temozolomide to 200mg/m2 (420mg) for cycle 2 with start date of 12/14.     We will also continue +/- Enzastaurin 500mg daily given resolution of pleuritic pain and have asked him to reach out if any worsening. Updated Dr. Stone on plan to continue study drug and she confirms.      Matt hayes in [place] and his family lives there. We have previously confirmed with the sponsor that he can transition his care to the enrolling site there. Our team and Matt have been in touch with Dr. Jeremy Rivers, radiation oncologist at the [facility]. Our research coordinator has confirmed the logistics of one more visit here prior to transitioning care to [place]. He austin have his MRI in [place] on 1/10/23.     PROBLEM LIST  Glioblastoma  Aphasia    PLAN  -CANCER DIRECTED THERAPY-  -Increase adjuvant temozolomide to 200mg/m2 (420 mg) +/- Enzastaurin 500mg daily, cycle 2 (start date of 12/14).     -Supportive medications; Compazine and bowel regimen.    *Zofran is not allowed on the clinical trial.     -Repeat 28 day cycle if WBC >= 3, ANC >= 1.5, HgB >= 10, and platelets >= 100.  -Surveillance labs reviewed, at goal for chemotherapy.     -Post-radiation imaging without any concerns. Repeat imaging in 1 month (in [place])    -Hypertension-  -Follow up with PCP for consideration of restarting amlodipine    -Pleuritic pain-  -Resolved at present.  -Obtain CT PE from [place] (has been requested)  -Continue 7 day course of  doxycycline  -Continue study drug   -Call if return or worsening of pleuritic pain or shortness of breath    -STEROIDS-  -Currently off dexamethasone.    -SEIZURE MANAGEMENT-  -Given history of seizures, will continue current antiepileptic regimen of Keppra for the foreseeable future.  -On Keppra.    -Quality of life/ MOOD/ FATIGUE-  -History of anxiety surrounding diagnosis and treatment.   -Following with Dr. Lane; developed strategies for navigating discussions with friends.  -Continue to monitor mood as untreated/ undertreated depression can worsen fatigue, dysorexia, and quality of life.    Follow up for labs in early January. January MRI and follow up will be in [place].      90 minutes spent on the date of the encounter doing chart review, review of test results, interpretation of tests, patient visit, documentation and discussion with other provider(s)         Again, thank you for allowing me to participate in the care of your patient.      Sincerely,    Amber J. Scheierl, APRN CNP

## 2022-12-13 ENCOUNTER — MYC MEDICAL ADVICE (OUTPATIENT)
Dept: ONCOLOGY | Facility: CLINIC | Age: 52
End: 2022-12-13

## 2022-12-13 NOTE — TELEPHONE ENCOUNTER
"Oncology Nurse Triage - Pain    Situation: Laz reporting the following symptoms: Pain in left side of chest/abdomen    Background:   Treating Provider:  Dr. Stone    Date of last office visit: 12/12/22 Amber Scheierl APRKAVYA    Recent Treatments: Research study    Waiting for results of CT scan done on Friday 12/9/22.   Pt is located in [place] today 12/13/22.   Home COVID test was negative on Sat 12/10/22.   Pt has not had a test for any other Upper Respiratory Virus.   Pt had recent travel through airport on Tuesday 12/6 for 12/7 appt and has traveled on Sun 12/11 for Monday 12/12 appts.     Assessment:     Location: Reoccuring pain left sided chest,   Onset: Last night  Has a non-productive cough for past few days, and dry mouth  Duration/Frequency: Pt feels pain more with deep breaths, at rest pain is tolerable   Rates: At rest 1-2/10, with breathing in 6/10.   Quality: Sharp pain with deeper breath.   Intermittent SOB with deep breaths  Current med(s) used: Took some Tylenol and was able to fall asleep but woke up very sweaty but felt better.   Has some clear runny nose but thinks likely from being outside, denies nasal congestion.       Denies fevers/chills,  sore throat, headache, n/v, bowel & bladder issues, abdominal pain.     Pt is currently taking research study drug 4 tablets, twice daily, scheduled to add on 5 day course of TEMODOR starting tomorrow 12/14/22.     Still waiting imaging results from [facility] CT scan of Chest done on Friday 12/9/22 in evening   Local ED did a Chest X-Ray on Thursday 12/8/22 in evening, they may have seen something in lower left lung and felt okay to treat with Azithromax but pt did not choose to take this until heard back from Oncology Research Team. Per patient, \"the local [facility] doctor told pt that the CT of Chest was unremarkable for evidence of a blood clot and more with findings of a pneumonia, but wasn't sure if that was official radiology " "reading\".     Pt is currently on Doxycycline Started 1 tablet on Saturday 12/10, then 2 tablets on 12/11, 12/12, 12/13.     Pt is wondering if side effects from research drug is including Pleurasy vs Pneumonia and wants to be sure that pt is involved in the decision to either continue the study drug or if being removed from study drug as pt has a few months invested thus far, Pt would like to stay in the research study as long as benefits outweigh the costs.      Recommendations:     [id number] Paged provider Dr. Stone    See MyChart from 12/13/22 response by research VINNIE Degroot        "

## 2022-12-16 ENCOUNTER — TRANSFERRED RECORDS (OUTPATIENT)
Dept: HEALTH INFORMATION MANAGEMENT | Facility: CLINIC | Age: 52
End: 2022-12-16

## 2022-12-16 ENCOUNTER — PATIENT OUTREACH (OUTPATIENT)
Dept: ONCOLOGY | Facility: CLINIC | Age: 52
End: 2022-12-16

## 2022-12-16 ENCOUNTER — TELEPHONE (OUTPATIENT)
Dept: ONCOLOGY | Facility: CLINIC | Age: 52
End: 2022-12-16

## 2022-12-16 NOTE — TELEPHONE ENCOUNTER
Research RN spoke with patient. He is feeling pretty well. Infrequent cough. Last noticed the left-sided pain on Wednesday night. No shortness of breath, fever, or chills.    His Temodar was scheduled to be delivered 12/13. Matt said it still has not arrived. Patient was given [place] specialty pharmacy number to call. He said they've gotten a lot of snow which might be a factor.    Per Dr. Stone, Matt can restart his study drug and Temodar when it arrives. Research RN called patient and told him he can start both medications when the Temodar arrives. He verbalized understanding. He said he will call the pharmacy to see if he can get more information on when it will arrive.

## 2022-12-16 NOTE — TELEPHONE ENCOUNTER
"[facility]: Cancer Care Short Note                                                                                          Incoming Communication:     Telephone note routed to writer:    \"  Mikayla Murcia, Angela Lezama, RN; Onc Adult Triage-; Lakeshia Dietrich RN; Gustabo Waller, VINNIE; Shirin Stone MD; Mikayla Murcia, RN 3 days ago     SE  Angus Miller,     I got a call from Dr. Stone that she is seeing her last pt in clinic and she told me the plan for Matt for triage to call Matt and let him know:   - Continue Doxycycline   - Hold study drug for now - this does not mean he is off the study, we are just holding until symptoms improve   - If chest pain and shortness of breath returns - go to ED to be evaluated   - Lakeshia or Giovanny to reach out to patient on 12/19 to assess symptoms and Dr. Stone will re-evaluate starting study drug back up     Mikayla Robin RN        Outgoing Communication:     TC to pt who stated he spoke with VINNIE Degroot, who reviewed all of the above information in phone note.     Pt reports feeling well overall and took last dose of doxy this morning but states understanding to seek care if s/s return.     Told pt that per care team messages that someone will be following up with him on Monday to re-assess his symptoms and discuss resuming treatment. Pt stated understanding of all and agreed to call clinic with any questions or concerns.     Lizzy Jones, MSN, RN, OCN  Oncology Care Coordinator  [facility]          "

## 2022-12-22 ENCOUNTER — TRANSFERRED RECORDS (OUTPATIENT)
Dept: HEALTH INFORMATION MANAGEMENT | Facility: CLINIC | Age: 52
End: 2022-12-22

## 2022-12-22 ENCOUNTER — TELEPHONE (OUTPATIENT)
Dept: ONCOLOGY | Facility: CLINIC | Age: 52
End: 2022-12-22

## 2022-12-22 NOTE — TELEPHONE ENCOUNTER
Research RN received call from Villa Hu NP, at [facility] walk in clinic in [place], [place]. Matt presented there with returning left chest wall pain and discomfort. Vitals stable, lungs clear. Pain is with deep breaths, not by pressure. Taking tylenol. Has been on steroids before, after his tumor resection. Provider inquiring if steroid and ibuprofen allowed with study medication. Steroid indicated for inflammation. CT chest done 12/9 in [place] showed possible pneumonia, L lower lobe nodule, 3 mo. follow up recommended. Records have been requested but haven't all come through, so Villa will have these records faxed.    Writer called Dr. Stone and notified of returning L chest wall pain and  L lower lobe nodule, who agrees with prednisone and said to continue study drug and have records faxed to us.    Writer called NP and relayed information. He will order 30 MG prednisone BID for 3 days and relay information to Matt.    Number for Matt's primary is [phone].

## 2023-01-02 ENCOUNTER — RESEARCH ENCOUNTER (OUTPATIENT)
Dept: ONCOLOGY | Facility: CLINIC | Age: 53
End: 2023-01-02

## 2023-01-02 ENCOUNTER — LAB (OUTPATIENT)
Dept: LAB | Facility: CLINIC | Age: 53
End: 2023-01-02
Attending: PSYCHIATRY & NEUROLOGY
Payer: COMMERCIAL

## 2023-01-02 VITALS
OXYGEN SATURATION: 96 % | SYSTOLIC BLOOD PRESSURE: 145 MMHG | RESPIRATION RATE: 16 BRPM | HEART RATE: 66 BPM | DIASTOLIC BLOOD PRESSURE: 91 MMHG | TEMPERATURE: 98.2 F

## 2023-01-02 DIAGNOSIS — C71.9 GLIOBLASTOMA (H): Primary | ICD-10-CM

## 2023-01-02 LAB
ALBUMIN SERPL BCG-MCNC: 3.9 G/DL (ref 3.5–5.2)
ALP SERPL-CCNC: 69 U/L (ref 40–129)
ALT SERPL W P-5'-P-CCNC: 26 U/L (ref 10–50)
ANION GAP SERPL CALCULATED.3IONS-SCNC: 10 MMOL/L (ref 7–15)
AST SERPL W P-5'-P-CCNC: 18 U/L (ref 10–50)
BASOPHILS # BLD AUTO: 0 10E3/UL (ref 0–0.2)
BASOPHILS NFR BLD AUTO: 0 %
BILIRUB DIRECT SERPL-MCNC: <0.2 MG/DL (ref 0–0.3)
BILIRUB SERPL-MCNC: 0.3 MG/DL
BUN SERPL-MCNC: 19.4 MG/DL (ref 6–20)
CALCIUM SERPL-MCNC: 9.2 MG/DL (ref 8.6–10)
CHLORIDE SERPL-SCNC: 103 MMOL/L (ref 98–107)
CREAT SERPL-MCNC: 0.92 MG/DL (ref 0.67–1.17)
DEPRECATED HCO3 PLAS-SCNC: 24 MMOL/L (ref 22–29)
EOSINOPHIL # BLD AUTO: 0.3 10E3/UL (ref 0–0.7)
EOSINOPHIL NFR BLD AUTO: 3 %
ERYTHROCYTE [DISTWIDTH] IN BLOOD BY AUTOMATED COUNT: 13.1 % (ref 10–15)
GFR SERPL CREATININE-BSD FRML MDRD: >90 ML/MIN/1.73M2
GLUCOSE SERPL-MCNC: 114 MG/DL (ref 70–99)
HCT VFR BLD AUTO: 44.9 % (ref 40–53)
HGB BLD-MCNC: 14.8 G/DL (ref 13.3–17.7)
IMM GRANULOCYTES # BLD: 0.1 10E3/UL
IMM GRANULOCYTES NFR BLD: 1 %
LYMPHOCYTES # BLD AUTO: 1.4 10E3/UL (ref 0.8–5.3)
LYMPHOCYTES NFR BLD AUTO: 15 %
MAGNESIUM SERPL-MCNC: 2.2 MG/DL (ref 1.7–2.3)
MCH RBC QN AUTO: 29.1 PG (ref 26.5–33)
MCHC RBC AUTO-ENTMCNC: 33 G/DL (ref 31.5–36.5)
MCV RBC AUTO: 88 FL (ref 78–100)
MONOCYTES # BLD AUTO: 0.7 10E3/UL (ref 0–1.3)
MONOCYTES NFR BLD AUTO: 7 %
NEUTROPHILS # BLD AUTO: 6.7 10E3/UL (ref 1.6–8.3)
NEUTROPHILS NFR BLD AUTO: 74 %
NRBC # BLD AUTO: 0 10E3/UL
NRBC BLD AUTO-RTO: 0 /100
PLATELET # BLD AUTO: 303 10E3/UL (ref 150–450)
POTASSIUM SERPL-SCNC: 4.3 MMOL/L (ref 3.4–5.3)
PROT SERPL-MCNC: 7.1 G/DL (ref 6.4–8.3)
RBC # BLD AUTO: 5.08 10E6/UL (ref 4.4–5.9)
SODIUM SERPL-SCNC: 137 MMOL/L (ref 136–145)
WBC # BLD AUTO: 9.1 10E3/UL (ref 4–11)

## 2023-01-02 PROCEDURE — 82248 BILIRUBIN DIRECT: CPT | Performed by: PSYCHIATRY & NEUROLOGY

## 2023-01-02 PROCEDURE — 83735 ASSAY OF MAGNESIUM: CPT | Performed by: PSYCHIATRY & NEUROLOGY

## 2023-01-02 PROCEDURE — 36415 COLL VENOUS BLD VENIPUNCTURE: CPT | Performed by: PSYCHIATRY & NEUROLOGY

## 2023-01-02 PROCEDURE — 85025 COMPLETE CBC W/AUTO DIFF WBC: CPT | Performed by: PSYCHIATRY & NEUROLOGY

## 2023-01-02 PROCEDURE — 99001 SPECIMEN HANDLING PT-LAB: CPT | Performed by: PSYCHIATRY & NEUROLOGY

## 2023-01-02 PROCEDURE — 80053 COMPREHEN METABOLIC PANEL: CPT | Performed by: PSYCHIATRY & NEUROLOGY

## 2023-01-02 NOTE — NURSING NOTE
Chief Complaint   Patient presents with     Labs Only     venipuncture     Valencia Vale RN on 1/2/2023 at 2:58 PM

## 2023-01-02 NOTE — NURSING NOTE
HUMBERTOOVO CLINICAL TRIAL VISIT NOTE    Date of Visit: 1/2/2023     Study: A Randomized, Double-Blind, Placebo-Controlled Phase 3 Study of Enzastaurin Added to Temozolomide During and Following Radiation Therapy in Newly Diagnosed Glioblastoma Patients Who Possess the Novel Genomic Biomarker [id number]     [facility] 2020   [id number]  IDS#: [id number]  Subject Name: Laz Stein  Subject ID: [id number]     Visit: Adjuvant Phase, Cycle 2, Day 22     Did the study visit occur within the appropriate window allowed by the protocol? Yes     Clinical Trial Treatment History:  -7/2022 PRESENTATION: Conductive aphasia, seizure activity.  -7/19/2022 MR brain imaging demonstrated a heterogeneously enhancing mixed solid and cystic mass in the left parietal lobe. Mild local mass effect and no midline shift.   -7/20/2022 SURGERY: Craniotomy with 5ALA-guided resection by Dr. Reese.   PATHOLOGY: Glioblastoma; IDH1, IDH2 wildtype. MGMT promoter unmethylated.               BRAF, PTEN, TP53 not mutated.              This infiltrating glioma shows palisading tumor necrosis and microvascular proliferation.  Post-operative MR brain imaging demonstrated gross total resection.  -8/12/2022 NEURO-ONC: Recommending upfront chemoradiotherapy on or off a clinical trial.   -8/22/2022 NEURO-ONC/ CLINICAL TRIAL: Clinically with numbness in the right leg. Screening labs. Planning MRI tomorrow.   -9/6 - 10/17/2022 CHEMORADS: 60cGy with concurrent temozolomide 160 mg daily and enzastaurin 250 mg daily vs placebo.  -9/6/22 NEURO-ONC: Started treatment per the DENOVO clinical trial.   -9/13/2022 NEURO-ONC/CHEMO-RAD/CLINICAL TRIAL: Lab visit only.  -9/21/2022 NEURO-ONC/CHEMO-RAD/CLINICAL TRIAL: Week 3 Provider visit and labs.   -9/27/2022 NEURO-ONC/CHEMO-RAD/CLINICAL TRIAL: Lab visit only.  -10/7/2022 NEURO-ONC/CHEMO-RAD/CLINICAL TRIAL: Week 5 Provider visit and labs.  -10/12/2022 NEURO-ONC/CHEMO-RAD/CLINICAL TRIAL: Lab visit only.  -10/18/22  NEURO-ONC/CHEMO: Completed upfront treatment per DENOVO clinical trial protocol. Will continue enzastaurin daily vs placebo.  -11/1/2022 NEURO-ONC/CLINICAL TRIAL: Lab visit only. Increase enzastaurin/placebo to 500 mg today.  -11/15/2022 NEURO-ONC/ MRB/ CLINICAL TRIAL: Clinically well. Imaging with no concerns. Starting adjuvant temozolomide 150mg/m2 (300mg) +/- Enzastaurin 500mg daily, cycle 1 (start date 11/16).  -12/7/2022 NEURO-ONC/ CLINICAL TRIAL: Lab, vitals, and nurse visit only.  12/9/2022 ED visit: New pleuritic pain thought to be s/t pneumonia. Recommendation for doxycycline. Reportedly no evidence for PE on subsequent CT.  -12/12/2022 NEURO-ONC/ CLINICAL TRIAL: Labs and provider visit. Clinically left pleuritic pain is better. Neurologically stable.  Plan to increase adjuvant temozolomide to 200mg/m2 (420mg) +/- Enzastaurin 500mg daily, cycle 2.   -12/19/2022 NEURO-ONC/ CLINICAL TRIAL: Held Enzastaurin/placebo on 12/15 for symptoms of pneumonia/Grade 2 pleuritic pain and restarted 12/19. Completed C2 Temodar 12/19 through 12/23.  -1/2/2023 NEURO-ONC/ CLINICAL TRIAL: Labs only.     Vitals:   BP (!) 145/91 (BP Location: Left arm, Patient Position: Sitting, Cuff Size: Adult Regular)   Pulse 66   Temp 98.2  F (36.8  C) (Oral)   Resp 16   SpO2 96%      - Hypertension: Continue Grade 2  - All other criteria grade 0/normal.     Baseline Weight: 85.6 kg  % Weight Change from Baseline: +2.2 %    Abnormal Assessment Findings Per Amber Scheierl, NP and AE Grading:     Medical History or ongoing AEs:              Previously had an episode of tingling involving the right hip and thigh which resolved on its own  - Intermittent paresthesia: bilateral lower extremity: Grade 1              Mild infrequent frontal headache at times, none currently.  - Intermittent headache: Grade 1              Pruritus and dermatitis of the left parietal scalp that he is treating with topical moisturizers.  - Dermatitis radiation:  Grade 1              Still feels like he'll occasionally struggle to choose the right word but not noticeable to those around him.  - Dysphasia: Grade 1              Occasional gas and constipation, none currently.  - Constipation: Grade 1  - Alopecia: Grade 2  - Fatigue: Grade 1  - Intermittent dyspnea: Grade 1              Seen in the ED in [place] on 12/9 for pleuritic chest pain thought to be secondary to bronchitis.  - Improving intermittent pleuritic pain: Start Grade 1    - All other criteria grade 0/normal.     I have personally interviewed Laz Stein and reviewed his medical record for adverse events and these have been recorded on the corresponding logs in Laz Stein's research file.      CONCOMITANT MEDICATIONS:   I have personally reviewed concomitant medications and these have been recorded on the corresponding logs in Matt's research file.  See full list of medications below:     Current Outpatient Medications   Medication Sig Dispense Refill     doxycycline monohydrate (ADOXA) 100 MG tablet        levETIRAcetam (KEPPRA) 1000 MG tablet Take 1 tablet (1,000 mg) by mouth 2 times daily 60 tablet 11     prochlorperazine (COMPAZINE) 10 MG tablet Take 1 tablet (10 mg) by mouth every 6 hours as needed (Nausea/Vomiting) 30 tablet 2     study - enzastaurin vs placebo, IDS# [id number], 125 mg TABS tablet Take 4 tablets (500 mg) by mouth daily Within 30 minutes of completing a meal, preferably approximately the same time each day. The tablets must be swallowed whole, no crushing or breaking allowed. 112 tablet 0     study - enzastaurin vs placebo, IDS# [id number], 125 mg TABS tablet Take 4 tablets (500 mg) by mouth daily Within 30 minutes of completing a meal, preferably approximately the same time each day. The tablets must be swallowed whole, no crushing or breaking allowed. 112 tablet 0     temozolomide (TEMODAR) 100 MG capsule Take 3 capsules (300 mg) by mouth daily for 5 doses Day 1 thru 5 of  each cycle. Take at bedtime on an empty stomach. 15 capsule 0     temozolomide (TEMODAR) 140 MG capsule Take 3 capsules (420 mg) by mouth daily for 5 doses Day 1 thru 5 of each cycle. Take at bedtime on an empty stomach. 15 capsule 0         DRUG ALLERGIES:    No Known Allergies    LAB RESULTS:  Labs including CBC, CMP, Mag, Direct Bili, and Research Kit were drawn. Labs were reviewed- any significant lab values were addressed and reviewed.       - All criteria grade 0/normal or abnormal but not accompanied by clinical symptoms or leading to treatment changes, additional therapies, medical interventions, or additional testing or not scored per CTCAE criteria.     Component      Latest Ref Rng & Units 1/2/2023   Glucose      70 - 99 mg/dL 114 (H)     Performance Status (KPS): 100    Percentage    100 Normal, no complaints, no evidence of disease   90 Able to carry on normal activity; minor signs or symptoms of disease   80 Normal activity with effort; some signs or symptoms of disease   70 Cares for self; unable to carry on normal activity or do active work   60 Requires occasional assistance, but is able to care for most of his/her needs   50 Requires considerable assistance and frequent medical care   40 Disabled; requires special care and assistance   30 Severely disabled, hospitalization indicated. Death not imminent   20 Very sick, hospitalization necessary, active supportive treatment necessary   10 Moribund, fatal processes, progressing rapidly   0 Dead     Overall Treatment Plan for Clinical Trial:   This is a randomized controlled Phase 3 trial evaluating the effect of enzastaurin in patients with glioblastoma, where the primary endpoint is OS. Patients with newly diagnosed glioblastoma who meet all eligibility criteria will be enrolled. Randomization will occur within approximately 6 weeks after resection of the glioblastoma. Study treatment (Day 1) is recommended to begin on the same day or no later than 3  days after randomization. The 3 phases of the study are the same as in the run-in part except for patients receiving placebo in place of enzastaurin. Patients will be randomized 1:1 to Arm A: RT plus temozolomide and enzastaurin for 6 weeks, ending with the last dose of RT and Day 42 of temozolomide and enzastaurin therapy (Concurrent Phase), followed by enzastaurin alone for 21 to 35 days (Single-Agent Phase), then temozolomide for 6 cycles (or up to 12 cycles according to SOC) and enzastaurin up to a total of 24 cycles (1 cycle = 28 days) (Adjuvant Phase); or Arm B: RT plus temozolomide and placebo for 6 weeks, ending with Day 42 of temozolomide and placebo therapy (Concurrent Phase) followed by placebo alone for 21 to 35 days (Single-Agent Phase), then temozolomide for 6 cycles (or up to 12 cycles according to SOC) and placebo up to a total of 24 cycles (1 cycle = 28 days) (Adjuvant Phase).     Since temozolomide should be administered on an empty stomach or at least 2 hours after a meal and enzastaurin/placebo should be administered within 30 minutes after a meal, patients should take temozolomide on an empty stomach, after an appropriate wait (recommend ~60 minutes) eat a meal, and then take the enzastaurin/placebo dose. Alternatively, patients may take temozolomide at bedtime and enzastaurin/placebo in the morning with or within 30 minutes after breakfast.     Research RN Impression:   Matt was here labs and vitals. Research RN met him to assess KPS. He was given a Cl[employer] from [employer]. He's feeling pretty well. Did notice his left-sided pain coming back last Sunday but none since. He said he has slowly started the keto diet. It is not prohibited by the trial, but writer asked him to let the oncologist at the [facility] know. He has an appointment there on 1/10 which will include an MRI. He asked if we've received his CT scan records. We have the imaging but not the report, and it was faxed to HIM and Research RN  and RN[facility] have requested it. He took his study drug at 10 am today.    Today is his last visit at [facility] and will be seen at the [facility] in [place] where he's transferring care while continuing on the Denovo trial there with Dr. Maciel.      Laz Stein was given the opportunity to ask any trial related questions. The patient knows to call with any new or worsening symptoms or concerns.      Please see provider progress note for full physical exam and other clinical information.      Mikayla Murcia, Clinical Research Coordinator, RN.  [facility], [facility]   Clinical Trials Office  Solid Tumor Unit

## 2023-02-06 DIAGNOSIS — C71.9 GLIOBLASTOMA (H): ICD-10-CM

## 2023-02-07 RX ORDER — PROCHLORPERAZINE MALEATE 10 MG
10 TABLET ORAL EVERY 6 HOURS PRN
Qty: 30 TABLET | Refills: 2 | Status: SHIPPED | OUTPATIENT
Start: 2023-02-07

## 2023-02-07 NOTE — TELEPHONE ENCOUNTER
"Prochlorperazine 10mg tablet   Last prescribing provider: Dr. Shirin Stone on 10/20/22    Last clinic visit date: 12/12/22 w/ Amber Scheierl    Recommendations for requested medication (if none, N/A): from 12/12/22, \"-Supportive medications; Compazine and bowel regimen.               *Zofran is not allowed on the clinical trial.\"    Any other pertinent information (if none, N/A): NA    Refilled: Y/N, if NO, why?    Routed to Amber Scheierl.  "

## 2023-04-07 ENCOUNTER — MYC MEDICAL ADVICE (OUTPATIENT)
Dept: ONCOLOGY | Facility: CLINIC | Age: 53
End: 2023-04-07
Payer: COMMERCIAL

## 2023-04-11 ENCOUNTER — ALLIED HEALTH/NURSE VISIT (OUTPATIENT)
Dept: ONCOLOGY | Facility: CLINIC | Age: 53
End: 2023-04-11
Payer: COMMERCIAL

## 2023-04-11 DIAGNOSIS — Z00.6 EXAMINATION OF PARTICIPANT OR CONTROL IN CLINICAL RESEARCH: Primary | ICD-10-CM

## 2023-04-11 NOTE — NURSING NOTE
"Research RN received e-mail from Matt: \"Angus Reese mentioned reaching out to you.   He and Dr. Stone would like to review my MRI that was done in [place] at the [facility] on April 3.    They have not received anything, and we are wondering if through the trial sponsor they would be able to expedite that information.   Thank you, Matt.\"    Research RN responded: Angus Gutierrez,    I just checked now, and the most recent we have is still January's. I requested the MRIs from Feb and April through our medical records team yesterday, and she said she'll let Lakeshia and I know as soon as they're in, and then we'll let Dr. Reese and Dr. Stone know. There isn't a way of expediting the process of transferring records through the trial sponsor that I'm aware of, but you are welcome to contact the sponsor at [email or web address], or [facility] [employer] Information ([facility]) Department at [phone], or the team at the [facility] to check the status.     Thanks,  VINNIE Degroot  "

## 2023-04-13 ENCOUNTER — DOCUMENTATION ONLY (OUTPATIENT)
Dept: NEUROLOGY | Facility: CLINIC | Age: 53
End: 2023-04-13
Payer: COMMERCIAL

## 2023-04-13 ENCOUNTER — TRANSFERRED RECORDS (OUTPATIENT)
Dept: HEALTH INFORMATION MANAGEMENT | Facility: CLINIC | Age: 53
End: 2023-04-13
Payer: COMMERCIAL

## 2023-04-13 ENCOUNTER — PREP FOR PROCEDURE (OUTPATIENT)
Dept: NEUROLOGY | Facility: CLINIC | Age: 53
End: 2023-04-13
Payer: COMMERCIAL

## 2023-04-13 DIAGNOSIS — C71.9 GLIOBLASTOMA (H): Primary | ICD-10-CM

## 2023-04-14 NOTE — TELEPHONE ENCOUNTER
I called the patient in regards to scheduling surgery with Dr. Reese. Patient does not need a covid test unless requested by provider and pre op has been taken care of with PCP. Patient is aware that the nursing team will be reaching out within the next few days. I did tell patient that a nurse will reach out within 2-3 days prior to surgery with arrival time and instructions.    Surgeon: Dr. Reese  Date of Surgery: 4/19/2023  Location of surgery: [facility]  Pre-Op H&P: 4/17/2023  Post-Op Appt Date: 5/5/2023   Imaging needed:  Yes  Discussed COVID-19 testing:  Yes  Pre-cert/Authorization completed:  Yes  Patient aware that pre-op RN will call 2-3 days prior to surgery with arrival time and instructions Yes       Please fax pre op order information to     [phone]          Lovely Narayanan on 4/14/2023 at 2:53 PM

## 2023-04-17 ENCOUNTER — TUMOR CONFERENCE (OUTPATIENT)
Dept: ONCOLOGY | Facility: CLINIC | Age: 53
End: 2023-04-17
Payer: COMMERCIAL

## 2023-04-18 ENCOUNTER — ANESTHESIA EVENT (OUTPATIENT)
Dept: SURGERY | Facility: CLINIC | Age: 53
End: 2023-04-18
Payer: COMMERCIAL

## 2023-04-18 NOTE — ANESTHESIA PREPROCEDURE EVALUATION
Anesthesia Pre-Procedure Evaluation    Patient: Laz Stein   MRN: [id number] : 1970        Procedure : Procedure(s):  Optical tracking system craniotomy, excise tumor          Past Medical History:   Diagnosis Date     Glioblastoma (H)      HTN (hypertension)      PONV (postoperative nausea and vomiting)       Past Surgical History:   Procedure Laterality Date     JOINT REPLACEMENT, HIP RT/LT Left      JOINT REPLACEMENT, HIP RT/LT Right      KNEE SURGERY Right     cartilage removal     OPTICAL TRACKING SYSTEM CRANIOTOMY, EXCISE TUMOR, COMBINED Left 2022    Procedure: Stealth assisted  CRANIOTOMY FOR TUMOR RESECTION;  Surgeon: Tanner Reese MD;  Location: [facility] OR      No Known Allergies   Social History     Tobacco Use     Smoking status: Never     Smokeless tobacco: Never   Vaping Use     Vaping status: Not on file   Substance Use Topics     Alcohol use: Yes     Comment: rarely      Wt Readings from Last 1 Encounters:   22 87.5 kg (192 lb 13.6 oz)        Anesthesia Evaluation   Pt has had prior anesthetic. Type: General.    History of anesthetic complications  - PONV.  Severe PONV after both hip surgeries.    ROS/MED HX  ENT/Pulmonary:    (-) tobacco use   Neurologic: Comment: Lesion on brain imaging in left parieto-occipital lesion s/p prior resection 22      Cardiovascular:  - neg cardiovascular ROS   (+) hypertension-----    METS/Exercise Tolerance: >4 METS    Hematologic:  - neg hematologic  ROS     Musculoskeletal:  - neg musculoskeletal ROS     GI/Hepatic:  - neg GI/hepatic ROS     Renal/Genitourinary:  - neg Renal ROS     Endo:  - neg endo ROS     Psychiatric/Substance Use:  - neg psychiatric ROS     Infectious Disease:  - neg infectious disease ROS     Malignancy:  - neg malignancy ROS     Other:            Physical Exam    Airway        Mallampati: II   TM distance: > 3 FB   Neck ROM: full   Mouth opening: > 3 cm    Respiratory Devices and Support          Dental       (+) Minor Abnormalities - some fillings, tiny chips      Cardiovascular   cardiovascular exam normal          Pulmonary   pulmonary exam normal                OUTSIDE LABS:  CBC:   Lab Results   Component Value Date    WBC 9.1 01/02/2023    WBC 6.9 12/12/2022    HGB 14.8 01/02/2023    HGB 14.9 12/12/2022    HCT 44.9 01/02/2023    HCT 44.7 12/12/2022     01/02/2023     12/12/2022     BMP:   Lab Results   Component Value Date     01/02/2023     12/12/2022    POTASSIUM 4.3 01/02/2023    POTASSIUM 4.0 12/12/2022    CHLORIDE 103 01/02/2023    CHLORIDE 102 12/12/2022    CO2 24 01/02/2023    CO2 25 12/12/2022    BUN 19.4 01/02/2023    BUN 13.5 12/12/2022    CR 0.92 01/02/2023    CR 0.89 12/12/2022     (H) 01/02/2023     (H) 12/12/2022     COAGS:   Lab Results   Component Value Date    PTT 27 07/20/2022    INR 1.11 07/20/2022     POC: No results found for: BGM, HCG, HCGS  HEPATIC:   Lab Results   Component Value Date    ALBUMIN 3.9 01/02/2023    PROTTOTAL 7.1 01/02/2023    ALT 26 01/02/2023    AST 18 01/02/2023    ALKPHOS 69 01/02/2023    BILITOTAL 0.3 01/02/2023     OTHER:   Lab Results   Component Value Date    PH 7.31 (L) 07/20/2022    LACT 0.9 07/20/2022    ABDIEL 9.2 01/02/2023    PHOS 2.7 07/22/2022    MAG 2.2 01/02/2023       Anesthesia Plan    ASA Status:  3   NPO Status:  NPO Appropriate    Anesthesia Type: General.     - Airway: ETT   Induction: Intravenous.   Maintenance: Balanced.   Techniques and Equipment:     - Lines/Monitors: 2nd IV, Arterial Line     Consents    Anesthesia Plan(s) and associated risks, benefits, and realistic alternatives discussed. Questions answered and patient/representative(s) expressed understanding.     - Discussed: Risks, Benefits and Alternatives for BOTH SEDATION and the PROCEDURE were discussed     - Discussed with:  Patient, Spouse         Postoperative Care    Pain management: IV analgesics, Multi-modal analgesia.   PONV  prophylaxis: Ondansetron (or other 5HT-3), Dexamethasone or Solumedrol, Background Propofol Infusion     Comments:                Trey Retana MD

## 2023-04-19 ENCOUNTER — HOSPITAL ENCOUNTER (INPATIENT)
Facility: CLINIC | Age: 53
LOS: 1 days | Discharge: HOME | End: 2023-04-20
Attending: NEUROLOGICAL SURGERY | Admitting: NEUROLOGICAL SURGERY
Payer: COMMERCIAL

## 2023-04-19 ENCOUNTER — HOSPITAL ENCOUNTER (OUTPATIENT)
Dept: MRI IMAGING | Facility: CLINIC | Age: 53
Discharge: HOME | End: 2023-04-19
Attending: NEUROLOGICAL SURGERY | Admitting: NEUROLOGICAL SURGERY
Payer: COMMERCIAL

## 2023-04-19 ENCOUNTER — APPOINTMENT (OUTPATIENT)
Dept: CT IMAGING | Facility: CLINIC | Age: 53
End: 2023-04-19
Payer: COMMERCIAL

## 2023-04-19 ENCOUNTER — ANESTHESIA (OUTPATIENT)
Dept: SURGERY | Facility: CLINIC | Age: 53
End: 2023-04-19
Payer: COMMERCIAL

## 2023-04-19 DIAGNOSIS — C71.9 GLIOBLASTOMA (H): ICD-10-CM

## 2023-04-19 DIAGNOSIS — C71.9 GLIOBLASTOMA (H): Primary | ICD-10-CM

## 2023-04-19 LAB
ABO/RH(D): NORMAL
ANTIBODY SCREEN: NEGATIVE
GLUCOSE BLDC GLUCOMTR-MCNC: 109 MG/DL (ref 70–99)
GLUCOSE BLDC GLUCOMTR-MCNC: 145 MG/DL (ref 70–99)
SPECIMEN EXPIRATION DATE: NORMAL

## 2023-04-19 PROCEDURE — 88331 PATH CONSLTJ SURG 1 BLK 1SPC: CPT | Mod: 26 | Performed by: PATHOLOGY

## 2023-04-19 PROCEDURE — 999N000141 HC STATISTIC PRE-PROCEDURE NURSING ASSESSMENT: Performed by: NEUROLOGICAL SURGERY

## 2023-04-19 PROCEDURE — 200N000002 HC R&B ICU UMMC

## 2023-04-19 PROCEDURE — 258N000003 HC RX IP 258 OP 636

## 2023-04-19 PROCEDURE — 272N000001 HC OR GENERAL SUPPLY STERILE: Performed by: NEUROLOGICAL SURGERY

## 2023-04-19 PROCEDURE — 255N000002 HC RX 255 OP 636: Performed by: NEUROLOGICAL SURGERY

## 2023-04-19 PROCEDURE — A9585 GADOBUTROL INJECTION: HCPCS | Performed by: NEUROLOGICAL SURGERY

## 2023-04-19 PROCEDURE — 70450 CT HEAD/BRAIN W/O DYE: CPT | Mod: 26 | Performed by: RADIOLOGY

## 2023-04-19 PROCEDURE — 70450 CT HEAD/BRAIN W/O DYE: CPT

## 2023-04-19 PROCEDURE — 8E09XBH COMPUTER ASSISTED PROCEDURE OF HEAD AND NECK REGION, WITH MAGNETIC RESONANCE IMAGING: ICD-10-PCS | Performed by: NEUROLOGICAL SURGERY

## 2023-04-19 PROCEDURE — C1713 ANCHOR/SCREW BN/BN,TIS/BN: HCPCS | Performed by: NEUROLOGICAL SURGERY

## 2023-04-19 PROCEDURE — 36415 COLL VENOUS BLD VENIPUNCTURE: CPT | Performed by: ANESTHESIOLOGY

## 2023-04-19 PROCEDURE — 250N000025 HC SEVOFLURANE, PER MIN: Performed by: NEUROLOGICAL SURGERY

## 2023-04-19 PROCEDURE — 70553 MRI BRAIN STEM W/O & W/DYE: CPT

## 2023-04-19 PROCEDURE — 360N000079 HC SURGERY LEVEL 6, PER MIN: Performed by: NEUROLOGICAL SURGERY

## 2023-04-19 PROCEDURE — 70553 MRI BRAIN STEM W/O & W/DYE: CPT | Mod: 26 | Performed by: RADIOLOGY

## 2023-04-19 PROCEDURE — 61781 SCAN PROC CRANIAL INTRA: CPT | Performed by: NEUROLOGICAL SURGERY

## 2023-04-19 PROCEDURE — 250N000011 HC RX IP 250 OP 636

## 2023-04-19 PROCEDURE — 710N000010 HC RECOVERY PHASE 1, LEVEL 2, PER MIN: Performed by: NEUROLOGICAL SURGERY

## 2023-04-19 PROCEDURE — 250N000009 HC RX 250: Performed by: NEUROLOGICAL SURGERY

## 2023-04-19 PROCEDURE — 250N000009 HC RX 250: Performed by: STUDENT IN AN ORGANIZED HEALTH CARE EDUCATION/TRAINING PROGRAM

## 2023-04-19 PROCEDURE — 250N000013 HC RX MED GY IP 250 OP 250 PS 637

## 2023-04-19 PROCEDURE — 250N000012 HC RX MED GY IP 250 OP 636 PS 637

## 2023-04-19 PROCEDURE — 88342 IMHCHEM/IMCYTCHM 1ST ANTB: CPT | Mod: 26 | Performed by: PATHOLOGY

## 2023-04-19 PROCEDURE — 258N000003 HC RX IP 258 OP 636: Performed by: STUDENT IN AN ORGANIZED HEALTH CARE EDUCATION/TRAINING PROGRAM

## 2023-04-19 PROCEDURE — 00B70ZZ EXCISION OF CEREBRAL HEMISPHERE, OPEN APPROACH: ICD-10-PCS | Performed by: NEUROLOGICAL SURGERY

## 2023-04-19 PROCEDURE — 86901 BLOOD TYPING SEROLOGIC RH(D): CPT | Performed by: ANESTHESIOLOGY

## 2023-04-19 PROCEDURE — 250N000009 HC RX 250

## 2023-04-19 PROCEDURE — 88307 TISSUE EXAM BY PATHOLOGIST: CPT | Mod: 26 | Performed by: PATHOLOGY

## 2023-04-19 PROCEDURE — 370N000017 HC ANESTHESIA TECHNICAL FEE, PER MIN: Performed by: NEUROLOGICAL SURGERY

## 2023-04-19 PROCEDURE — C1763 CONN TISS, NON-HUMAN: HCPCS | Performed by: NEUROLOGICAL SURGERY

## 2023-04-19 PROCEDURE — 250N000011 HC RX IP 250 OP 636: Performed by: STUDENT IN AN ORGANIZED HEALTH CARE EDUCATION/TRAINING PROGRAM

## 2023-04-19 PROCEDURE — 00B70ZX EXCISION OF CEREBRAL HEMISPHERE, OPEN APPROACH, DIAGNOSTIC: ICD-10-PCS | Performed by: NEUROLOGICAL SURGERY

## 2023-04-19 PROCEDURE — 88331 PATH CONSLTJ SURG 1 BLK 1SPC: CPT | Mod: TC | Performed by: NEUROLOGICAL SURGERY

## 2023-04-19 PROCEDURE — 61510 CRNEC TREPH EXC BRN TUM STTL: CPT | Mod: GC | Performed by: NEUROLOGICAL SURGERY

## 2023-04-19 DEVICE — GRAFT DURAGEN 2X2" ID220: Type: IMPLANTABLE DEVICE | Site: CRANIAL | Status: FUNCTIONAL

## 2023-04-19 DEVICE — IMPLANTABLE DEVICE: Type: IMPLANTABLE DEVICE | Site: CRANIAL | Status: FUNCTIONAL

## 2023-04-19 DEVICE — SCREW BONE NEURO 3 AXIS 4X1.5MM 56-15934: Type: IMPLANTABLE DEVICE | Site: CRANIAL | Status: FUNCTIONAL

## 2023-04-19 RX ORDER — LIDOCAINE HYDROCHLORIDE 20 MG/ML
INJECTION, SOLUTION INFILTRATION; PERINEURAL PRN
Status: DISCONTINUED | OUTPATIENT
Start: 2023-04-19 | End: 2023-04-19

## 2023-04-19 RX ORDER — PHENYLEPHRINE HCL IN 0.9% NACL 50MG/250ML
.5-1.25 PLASTIC BAG, INJECTION (ML) INTRAVENOUS CONTINUOUS
Status: DISCONTINUED | OUTPATIENT
Start: 2023-04-19 | End: 2023-04-19 | Stop reason: HOSPADM

## 2023-04-19 RX ORDER — AMOXICILLIN 250 MG
1 CAPSULE ORAL 2 TIMES DAILY
Status: DISCONTINUED | OUTPATIENT
Start: 2023-04-19 | End: 2023-04-20 | Stop reason: HOSPADM

## 2023-04-19 RX ORDER — FENTANYL CITRATE 50 UG/ML
INJECTION, SOLUTION INTRAMUSCULAR; INTRAVENOUS PRN
Status: DISCONTINUED | OUTPATIENT
Start: 2023-04-19 | End: 2023-04-19

## 2023-04-19 RX ORDER — SODIUM CHLORIDE, SODIUM LACTATE, POTASSIUM CHLORIDE, CALCIUM CHLORIDE 600; 310; 30; 20 MG/100ML; MG/100ML; MG/100ML; MG/100ML
INJECTION, SOLUTION INTRAVENOUS CONTINUOUS PRN
Status: DISCONTINUED | OUTPATIENT
Start: 2023-04-19 | End: 2023-04-19

## 2023-04-19 RX ORDER — BISACODYL 10 MG
10 SUPPOSITORY, RECTAL RECTAL DAILY PRN
Status: DISCONTINUED | OUTPATIENT
Start: 2023-04-19 | End: 2023-04-20 | Stop reason: HOSPADM

## 2023-04-19 RX ORDER — HYDRALAZINE HYDROCHLORIDE 20 MG/ML
10-20 INJECTION INTRAMUSCULAR; INTRAVENOUS EVERY 30 MIN PRN
Status: DISCONTINUED | OUTPATIENT
Start: 2023-04-19 | End: 2023-04-20 | Stop reason: HOSPADM

## 2023-04-19 RX ORDER — NALOXONE HYDROCHLORIDE 0.4 MG/ML
0.4 INJECTION, SOLUTION INTRAMUSCULAR; INTRAVENOUS; SUBCUTANEOUS
Status: DISCONTINUED | OUTPATIENT
Start: 2023-04-19 | End: 2023-04-20 | Stop reason: HOSPADM

## 2023-04-19 RX ORDER — PROPOFOL 10 MG/ML
INJECTION, EMULSION INTRAVENOUS PRN
Status: DISCONTINUED | OUTPATIENT
Start: 2023-04-19 | End: 2023-04-19

## 2023-04-19 RX ORDER — NALOXONE HYDROCHLORIDE 0.4 MG/ML
0.2 INJECTION, SOLUTION INTRAMUSCULAR; INTRAVENOUS; SUBCUTANEOUS
Status: DISCONTINUED | OUTPATIENT
Start: 2023-04-19 | End: 2023-04-20 | Stop reason: HOSPADM

## 2023-04-19 RX ORDER — LIDOCAINE 40 MG/G
CREAM TOPICAL
Status: DISCONTINUED | OUTPATIENT
Start: 2023-04-19 | End: 2023-04-20 | Stop reason: HOSPADM

## 2023-04-19 RX ORDER — DEXAMETHASONE 4 MG/1
4 TABLET ORAL 2 TIMES DAILY
Status: DISCONTINUED | OUTPATIENT
Start: 2023-04-19 | End: 2023-04-20

## 2023-04-19 RX ORDER — PROPOFOL 10 MG/ML
INJECTION, EMULSION INTRAVENOUS CONTINUOUS PRN
Status: DISCONTINUED | OUTPATIENT
Start: 2023-04-19 | End: 2023-04-19

## 2023-04-19 RX ORDER — SODIUM CHLORIDE, SODIUM LACTATE, POTASSIUM CHLORIDE, CALCIUM CHLORIDE 600; 310; 30; 20 MG/100ML; MG/100ML; MG/100ML; MG/100ML
INJECTION, SOLUTION INTRAVENOUS CONTINUOUS
Status: DISCONTINUED | OUTPATIENT
Start: 2023-04-19 | End: 2023-04-19 | Stop reason: HOSPADM

## 2023-04-19 RX ORDER — ONDANSETRON 4 MG/1
4 TABLET, ORALLY DISINTEGRATING ORAL EVERY 30 MIN PRN
Status: DISCONTINUED | OUTPATIENT
Start: 2023-04-19 | End: 2023-04-19 | Stop reason: HOSPADM

## 2023-04-19 RX ORDER — PROCHLORPERAZINE MALEATE 10 MG
10 TABLET ORAL EVERY 6 HOURS PRN
Status: DISCONTINUED | OUTPATIENT
Start: 2023-04-19 | End: 2023-04-20 | Stop reason: HOSPADM

## 2023-04-19 RX ORDER — DEXAMETHASONE 1 MG
1 TABLET ORAL 2 TIMES DAILY
Status: DISCONTINUED | OUTPATIENT
Start: 2023-04-21 | End: 2023-04-20

## 2023-04-19 RX ORDER — DEXAMETHASONE SODIUM PHOSPHATE 4 MG/ML
INJECTION, SOLUTION INTRA-ARTICULAR; INTRALESIONAL; INTRAMUSCULAR; INTRAVENOUS; SOFT TISSUE PRN
Status: DISCONTINUED | OUTPATIENT
Start: 2023-04-19 | End: 2023-04-19

## 2023-04-19 RX ORDER — LABETALOL HYDROCHLORIDE 5 MG/ML
10-40 INJECTION, SOLUTION INTRAVENOUS EVERY 10 MIN PRN
Status: DISCONTINUED | OUTPATIENT
Start: 2023-04-19 | End: 2023-04-20 | Stop reason: HOSPADM

## 2023-04-19 RX ORDER — ONDANSETRON 2 MG/ML
4 INJECTION INTRAMUSCULAR; INTRAVENOUS EVERY 30 MIN PRN
Status: DISCONTINUED | OUTPATIENT
Start: 2023-04-19 | End: 2023-04-19 | Stop reason: HOSPADM

## 2023-04-19 RX ORDER — FENTANYL CITRATE 50 UG/ML
50 INJECTION, SOLUTION INTRAMUSCULAR; INTRAVENOUS EVERY 5 MIN PRN
Status: DISCONTINUED | OUTPATIENT
Start: 2023-04-19 | End: 2023-04-19 | Stop reason: HOSPADM

## 2023-04-19 RX ORDER — ONDANSETRON 2 MG/ML
4 INJECTION INTRAMUSCULAR; INTRAVENOUS EVERY 6 HOURS PRN
Status: DISCONTINUED | OUTPATIENT
Start: 2023-04-19 | End: 2023-04-20 | Stop reason: HOSPADM

## 2023-04-19 RX ORDER — HYDROMORPHONE HCL IN WATER/PF 6 MG/30 ML
0.4 PATIENT CONTROLLED ANALGESIA SYRINGE INTRAVENOUS
Status: DISCONTINUED | OUTPATIENT
Start: 2023-04-19 | End: 2023-04-20 | Stop reason: HOSPADM

## 2023-04-19 RX ORDER — POLYETHYLENE GLYCOL 3350 17 G/17G
17 POWDER, FOR SOLUTION ORAL DAILY
Status: DISCONTINUED | OUTPATIENT
Start: 2023-04-20 | End: 2023-04-20 | Stop reason: HOSPADM

## 2023-04-19 RX ORDER — DEXAMETHASONE SODIUM PHOSPHATE 10 MG/ML
10 INJECTION, SOLUTION INTRAMUSCULAR; INTRAVENOUS
Status: DISCONTINUED | OUTPATIENT
Start: 2023-04-19 | End: 2023-04-19

## 2023-04-19 RX ORDER — HYDROMORPHONE HCL IN WATER/PF 6 MG/30 ML
0.4 PATIENT CONTROLLED ANALGESIA SYRINGE INTRAVENOUS EVERY 5 MIN PRN
Status: DISCONTINUED | OUTPATIENT
Start: 2023-04-19 | End: 2023-04-19 | Stop reason: HOSPADM

## 2023-04-19 RX ORDER — LEVETIRACETAM 500 MG/1
1000 TABLET ORAL 2 TIMES DAILY
Status: DISCONTINUED | OUTPATIENT
Start: 2023-04-19 | End: 2023-04-20 | Stop reason: HOSPADM

## 2023-04-19 RX ORDER — OXYCODONE HYDROCHLORIDE 10 MG/1
10 TABLET ORAL EVERY 4 HOURS PRN
Status: DISCONTINUED | OUTPATIENT
Start: 2023-04-19 | End: 2023-04-20 | Stop reason: HOSPADM

## 2023-04-19 RX ORDER — CEFAZOLIN SODIUM 1 G/3ML
1 INJECTION, POWDER, FOR SOLUTION INTRAMUSCULAR; INTRAVENOUS EVERY 8 HOURS
Status: DISCONTINUED | OUTPATIENT
Start: 2023-04-19 | End: 2023-04-20 | Stop reason: HOSPADM

## 2023-04-19 RX ORDER — ACETAMINOPHEN 325 MG/1
650 TABLET ORAL EVERY 4 HOURS PRN
Status: DISCONTINUED | OUTPATIENT
Start: 2023-04-22 | End: 2023-04-20 | Stop reason: HOSPADM

## 2023-04-19 RX ORDER — MANNITOL 20 G/100ML
1 INJECTION, SOLUTION INTRAVENOUS
Status: COMPLETED | OUTPATIENT
Start: 2023-04-19 | End: 2023-04-19

## 2023-04-19 RX ORDER — ONDANSETRON 2 MG/ML
INJECTION INTRAMUSCULAR; INTRAVENOUS PRN
Status: DISCONTINUED | OUTPATIENT
Start: 2023-04-19 | End: 2023-04-19

## 2023-04-19 RX ORDER — GADOBUTROL 604.72 MG/ML
10 INJECTION INTRAVENOUS ONCE
Status: COMPLETED | OUTPATIENT
Start: 2023-04-19 | End: 2023-04-19

## 2023-04-19 RX ORDER — LEVETIRACETAM 10 MG/ML
1000 INJECTION INTRAVASCULAR
Status: COMPLETED | OUTPATIENT
Start: 2023-04-19 | End: 2023-04-19

## 2023-04-19 RX ORDER — LIDOCAINE 40 MG/G
CREAM TOPICAL
Status: DISCONTINUED | OUTPATIENT
Start: 2023-04-19 | End: 2023-04-19 | Stop reason: HOSPADM

## 2023-04-19 RX ORDER — LABETALOL 20 MG/4 ML (5 MG/ML) INTRAVENOUS SYRINGE
PRN
Status: DISCONTINUED | OUTPATIENT
Start: 2023-04-19 | End: 2023-04-19

## 2023-04-19 RX ORDER — ONDANSETRON 4 MG/1
4 TABLET, ORALLY DISINTEGRATING ORAL EVERY 6 HOURS PRN
Status: DISCONTINUED | OUTPATIENT
Start: 2023-04-19 | End: 2023-04-20 | Stop reason: HOSPADM

## 2023-04-19 RX ORDER — CEFAZOLIN SODIUM/WATER 2 G/20 ML
2 SYRINGE (ML) INTRAVENOUS
Status: COMPLETED | OUTPATIENT
Start: 2023-04-19 | End: 2023-04-19

## 2023-04-19 RX ORDER — DEXAMETHASONE 2 MG/1
2 TABLET ORAL 2 TIMES DAILY
Status: DISCONTINUED | OUTPATIENT
Start: 2023-04-20 | End: 2023-04-20

## 2023-04-19 RX ORDER — DEXAMETHASONE 1 MG
1 TABLET ORAL DAILY
Status: DISCONTINUED | OUTPATIENT
Start: 2023-04-23 | End: 2023-04-20

## 2023-04-19 RX ORDER — OXYCODONE HYDROCHLORIDE 5 MG/1
5 TABLET ORAL EVERY 4 HOURS PRN
Status: DISCONTINUED | OUTPATIENT
Start: 2023-04-19 | End: 2023-04-20 | Stop reason: HOSPADM

## 2023-04-19 RX ORDER — FENTANYL CITRATE 50 UG/ML
25 INJECTION, SOLUTION INTRAMUSCULAR; INTRAVENOUS EVERY 5 MIN PRN
Status: DISCONTINUED | OUTPATIENT
Start: 2023-04-19 | End: 2023-04-19 | Stop reason: HOSPADM

## 2023-04-19 RX ORDER — HYDROMORPHONE HCL IN WATER/PF 6 MG/30 ML
0.2 PATIENT CONTROLLED ANALGESIA SYRINGE INTRAVENOUS
Status: DISCONTINUED | OUTPATIENT
Start: 2023-04-19 | End: 2023-04-20 | Stop reason: HOSPADM

## 2023-04-19 RX ORDER — CYCLOBENZAPRINE HCL 10 MG
10 TABLET ORAL EVERY 8 HOURS PRN
Status: DISCONTINUED | OUTPATIENT
Start: 2023-04-19 | End: 2023-04-20 | Stop reason: HOSPADM

## 2023-04-19 RX ORDER — ACETAMINOPHEN 325 MG/1
975 TABLET ORAL EVERY 8 HOURS
Status: DISCONTINUED | OUTPATIENT
Start: 2023-04-19 | End: 2023-04-20 | Stop reason: HOSPADM

## 2023-04-19 RX ORDER — HYDROMORPHONE HCL IN WATER/PF 6 MG/30 ML
0.2 PATIENT CONTROLLED ANALGESIA SYRINGE INTRAVENOUS EVERY 5 MIN PRN
Status: DISCONTINUED | OUTPATIENT
Start: 2023-04-19 | End: 2023-04-19 | Stop reason: HOSPADM

## 2023-04-19 RX ORDER — SODIUM CHLORIDE 9 MG/ML
INJECTION, SOLUTION INTRAVENOUS CONTINUOUS
Status: ACTIVE | OUTPATIENT
Start: 2023-04-19 | End: 2023-04-20

## 2023-04-19 RX ADMIN — PHENYLEPHRINE HYDROCHLORIDE 100 MCG: 10 INJECTION INTRAVENOUS at 15:36

## 2023-04-19 RX ADMIN — FENTANYL CITRATE 25 MCG: 50 INJECTION, SOLUTION INTRAMUSCULAR; INTRAVENOUS at 17:35

## 2023-04-19 RX ADMIN — SENNOSIDES AND DOCUSATE SODIUM 1 TABLET: 50; 8.6 TABLET ORAL at 22:38

## 2023-04-19 RX ADMIN — ONDANSETRON 4 MG: 2 INJECTION INTRAMUSCULAR; INTRAVENOUS at 22:43

## 2023-04-19 RX ADMIN — PHENYLEPHRINE HYDROCHLORIDE 50 MCG: 10 INJECTION INTRAVENOUS at 16:04

## 2023-04-19 RX ADMIN — PHENYLEPHRINE HYDROCHLORIDE 100 MCG: 10 INJECTION INTRAVENOUS at 15:55

## 2023-04-19 RX ADMIN — PROPOFOL 70 MG: 10 INJECTION, EMULSION INTRAVENOUS at 15:24

## 2023-04-19 RX ADMIN — SODIUM CHLORIDE, POTASSIUM CHLORIDE, SODIUM LACTATE AND CALCIUM CHLORIDE: 600; 310; 30; 20 INJECTION, SOLUTION INTRAVENOUS at 19:30

## 2023-04-19 RX ADMIN — GADOBUTROL 8.5 ML: 604.72 INJECTION INTRAVENOUS at 13:26

## 2023-04-19 RX ADMIN — PHENYLEPHRINE HYDROCHLORIDE 100 MCG: 10 INJECTION INTRAVENOUS at 16:25

## 2023-04-19 RX ADMIN — PHENYLEPHRINE HYDROCHLORIDE 100 MCG: 10 INJECTION INTRAVENOUS at 15:19

## 2023-04-19 RX ADMIN — ONDANSETRON 4 MG: 2 INJECTION INTRAMUSCULAR; INTRAVENOUS at 19:02

## 2023-04-19 RX ADMIN — LEVETIRACETAM 1000 MG: 10 INJECTION INTRAVENOUS at 15:10

## 2023-04-19 RX ADMIN — CEFAZOLIN 1 G: 1 INJECTION, POWDER, FOR SOLUTION INTRAMUSCULAR; INTRAVENOUS at 22:47

## 2023-04-19 RX ADMIN — FENTANYL CITRATE 100 MCG: 50 INJECTION, SOLUTION INTRAMUSCULAR; INTRAVENOUS at 15:02

## 2023-04-19 RX ADMIN — LABETALOL 20 MG/4 ML (5 MG/ML) INTRAVENOUS SYRINGE 10 MG: at 17:33

## 2023-04-19 RX ADMIN — LEVETIRACETAM 1000 MG: 500 TABLET, FILM COATED ORAL at 22:38

## 2023-04-19 RX ADMIN — Medication 30 MG: at 15:49

## 2023-04-19 RX ADMIN — PHENYLEPHRINE HYDROCHLORIDE 100 MCG: 10 INJECTION INTRAVENOUS at 15:09

## 2023-04-19 RX ADMIN — Medication 50 MG: at 15:02

## 2023-04-19 RX ADMIN — DEXAMETHASONE 4 MG: 4 TABLET ORAL at 22:49

## 2023-04-19 RX ADMIN — FENTANYL CITRATE 100 MCG: 50 INJECTION, SOLUTION INTRAMUSCULAR; INTRAVENOUS at 15:45

## 2023-04-19 RX ADMIN — LIDOCAINE HYDROCHLORIDE 100 MG: 20 INJECTION, SOLUTION INFILTRATION; PERINEURAL at 15:02

## 2023-04-19 RX ADMIN — FENTANYL CITRATE 50 MCG: 50 INJECTION, SOLUTION INTRAMUSCULAR; INTRAVENOUS at 19:20

## 2023-04-19 RX ADMIN — SODIUM CHLORIDE, POTASSIUM CHLORIDE, SODIUM LACTATE AND CALCIUM CHLORIDE: 600; 310; 30; 20 INJECTION, SOLUTION INTRAVENOUS at 15:00

## 2023-04-19 RX ADMIN — LABETALOL 20 MG/4 ML (5 MG/ML) INTRAVENOUS SYRINGE 10 MG: at 17:17

## 2023-04-19 RX ADMIN — PHENYLEPHRINE HYDROCHLORIDE 0.5 MCG/KG/MIN: 10 INJECTION INTRAVENOUS at 16:31

## 2023-04-19 RX ADMIN — AMINOLEVULINIC ACID HYDROCHLORIDE 1719 MG: 1500 POWDER, FOR SOLUTION ORAL at 13:33

## 2023-04-19 RX ADMIN — PROPOFOL 30 MG: 10 INJECTION, EMULSION INTRAVENOUS at 17:32

## 2023-04-19 RX ADMIN — PHENYLEPHRINE HYDROCHLORIDE 50 MCG: 10 INJECTION INTRAVENOUS at 16:08

## 2023-04-19 RX ADMIN — PROPOFOL 200 MG: 10 INJECTION, EMULSION INTRAVENOUS at 15:02

## 2023-04-19 RX ADMIN — ONDANSETRON 4 MG: 2 INJECTION INTRAMUSCULAR; INTRAVENOUS at 17:31

## 2023-04-19 RX ADMIN — PROPOFOL 100 MCG/KG/MIN: 10 INJECTION, EMULSION INTRAVENOUS at 15:08

## 2023-04-19 RX ADMIN — FENTANYL CITRATE 25 MCG: 50 INJECTION, SOLUTION INTRAMUSCULAR; INTRAVENOUS at 17:40

## 2023-04-19 RX ADMIN — MIDAZOLAM 1 MG: 1 INJECTION INTRAMUSCULAR; INTRAVENOUS at 14:55

## 2023-04-19 RX ADMIN — ONDANSETRON 4 MG: 2 INJECTION INTRAMUSCULAR; INTRAVENOUS at 15:44

## 2023-04-19 RX ADMIN — SUGAMMADEX 200 MG: 100 INJECTION, SOLUTION INTRAVENOUS at 17:36

## 2023-04-19 RX ADMIN — ACETAMINOPHEN 975 MG: 325 TABLET ORAL at 22:38

## 2023-04-19 RX ADMIN — SODIUM CHLORIDE, PRESERVATIVE FREE: 5 INJECTION INTRAVENOUS at 22:39

## 2023-04-19 RX ADMIN — Medication 2 G: at 15:07

## 2023-04-19 RX ADMIN — PHENYLEPHRINE HYDROCHLORIDE 100 MCG: 10 INJECTION INTRAVENOUS at 15:14

## 2023-04-19 RX ADMIN — DEXAMETHASONE SODIUM PHOSPHATE 10 MG: 4 INJECTION, SOLUTION INTRA-ARTICULAR; INTRALESIONAL; INTRAMUSCULAR; INTRAVENOUS; SOFT TISSUE at 15:10

## 2023-04-19 RX ADMIN — PHENYLEPHRINE HYDROCHLORIDE 100 MCG: 10 INJECTION INTRAVENOUS at 15:28

## 2023-04-19 RX ADMIN — MANNITOL 86 G: 20 INJECTION, SOLUTION INTRAVENOUS at 15:18

## 2023-04-19 ASSESSMENT — VISUAL ACUITY
OU: NORMAL ACUITY

## 2023-04-19 ASSESSMENT — ACTIVITIES OF DAILY LIVING (ADL)
ADLS_ACUITY_SCORE: 20
ADLS_ACUITY_SCORE: 18

## 2023-04-19 ASSESSMENT — LIFESTYLE VARIABLES: TOBACCO_USE: 0

## 2023-04-19 NOTE — ANESTHESIA PROCEDURE NOTES
Airway       Patient location during procedure: OR       Procedure Start/Stop Times: 4/19/2023 3:05 PM  Staff -        Anesthesiologist:  Rylan Fagan MD       CRNA: Satish Koenig APRN CRNA       Performed By: CRNA  Consent for Airway        Urgency: elective  Indications and Patient Condition       Indications for airway management: phan-procedural       Induction type:intravenous       Mask difficulty assessment: 1 - vent by mask    Final Airway Details       Final airway type: endotracheal airway       Successful airway: ETT - single and Oral  Endotracheal Airway Details        ETT size (mm): 7.5       Cuffed: yes       Successful intubation technique: direct laryngoscopy       DL Blade Type: MAC 4       Grade View of Cords: 1       Adjucts: stylet       Position: Left       Measured from: gums/teeth       Secured at (cm): 23       Bite block used: Soft    Post intubation assessment        Placement verified by: capnometry, equal breath sounds and chest rise        Number of attempts at approach: 1       Number of other approaches attempted: 0       Secured with: pink tape       Ease of procedure: easy       Dentition: Intact    Medication(s) Administered   Medication Administration Time: 4/19/2023 3:05 PM

## 2023-04-19 NOTE — BRIEF OP NOTE
"[facility]    Brief Operative Note    Pre-operative diagnosis: Glioblastoma (H) [C71.9]  Post-operative diagnosis Same as pre-operative diagnosis    Procedure: Procedure(s):  Optical tracking system craniotomy, excise tumor  Surgeon: Surgeon(s) and Role:     * Tanner Reese MD - Primary     * Freddy Gonsalez MD - Resident - Assisting  Anesthesia: General   Estimated Blood Loss: 20ml    Drains: None  Specimens:   ID Type Source Tests Collected by Time Destination   1 : brain tumor #1 Tissue Brain SURGICAL PATHOLOGY EXAM Tanner Reese MD 4/19/2023  4:03 PM    2 : brain tumor #2 Tissue Brain SURGICAL PATHOLOGY EXAM Tanner Reese MD 4/19/2023  4:36 PM      Findings: Left craniotomy and tumor resection.  Complications: None.  Implants:   Implant Name Type Inv. Item Serial No.  Lot No. LRB No. Used Action   IMP PLATE SYN STR DOG BONE LOW PROFILE 2H .502 - [id number] Metallic [employer]ware/[place] IMP PLATE SYN STR DOG BONE LOW PROFILE 2H .502  SYNTHES[employer]STRATEC NONE Left 2 Explanted   IMP SCR SYN MATRIX LOW PRO 1.5X04MM SELF DRILL 04.503.104.01 - [id number] [employer]/[place] IMP SCR SYN MATRIX LOW PRO 1.5X04MM SELF DRILL [id number]  SYNTHES-STRATEC NONE Left 7 Explanted   IMP BUR HOLE COVER 24MM LOW PROFILE .528 - [id number] Metallic Hardware/[place] IMP BUR HOLE COVER 24MM LOW PROFILE .528  [employer]AT[employer] NONE Left 1 Explanted   GRAFT DURAGEN 2X2\"  - [id number]  GRAFT DURAGEN 2X2\"   [employer] [id number] Left 1 Implanted   2 hole dogbone plate    RAGHAV NA Left 2 Implanted   alan hole cover plate    RAGHAV NA Left 1 Implanted   SCREW BN 4MM 1.5MM SLF DRL AX STAB NS UNV NEURO 3 CRNL LF - [id number] Metallic Hardware/[place] SCREW BN 4MM 1.5MM SLF DRL AX STAB NS UNV NEURO 3 CRNL LF  RAGHAV [facility] NA Left 7 Implanted           "

## 2023-04-19 NOTE — ANESTHESIA CARE TRANSFER NOTE
Patient: Laz Stein    Procedure: Procedure(s):  Optical tracking system craniotomy, excise tumor       Diagnosis: Glioblastoma (H) [C71.9]  Diagnosis Additional Information: No value filed.    Anesthesia Type:   General     Note:    Oropharynx: oropharynx clear of all foreign objects and spontaneously breathing  Level of Consciousness: awake  Oxygen Supplementation: nasal cannula  Level of Supplemental Oxygen (L/min / FiO2): 4  Independent Airway: airway patency satisfactory and stable  Dentition: dentition unchanged  Vital Signs Stable: post-procedure vital signs reviewed and stable  Report to RN Given: handoff report given  Patient transferred to: PACU    Handoff Report: Identifed the Patient, Identified the Reponsible Provider, Reviewed the pertinent medical history, Discussed the surgical course, Reviewed Intra-OP anesthesia mangement and issues during anesthesia, Set expectations for post-procedure period and Allowed opportunity for questions and acknowledgement of understanding      Vitals:  Vitals Value Taken Time   /72 04/19/23 1802   Temp     Pulse 80 04/19/23 1806   Resp 14    SpO2 98 % 04/19/23 1806   Vitals shown include unvalidated device data.    Electronically Signed By: RAVEN Javed CRNA  April 19, 2023  6:08 PM

## 2023-04-19 NOTE — ANESTHESIA PROCEDURE NOTES
Arterial Line Procedure Note    Pre-Procedure   Staff -        Anesthesiologist:  Rylan Fagan MD       Performed By: anesthesiologist       Location: OR       Pre-Anesthestic Checklist: patient identified, IV checked, risks and benefits discussed, informed consent, monitors and equipment checked, pre-op evaluation and at physician/surgeon's request  Timeout:       Correct Patient: Yes        Correct Procedure: Yes        Correct Site: Yes        Correct Position: Yes   Line Placement:   This line was placed Post Induction  Procedure   Procedure: arterial line       Diagnosis: Brain tumor       Laterality: right       Insertion Site: radial.  Sterile Prep        Standard elements of sterile barrier followed       Skin prep: Chloraprep  Insertion/Injection        Technique: ultrasound guided        1. Ultrasound was used to evaluate the access site.       2. Artery evaluated via ultrasound for patency/adequacy.       3. Using real-time ultrasound the needle/catheter was observed entering the artery/vein.       5. The visualized structures were anatomically normal.       6. There were no apparent abnormal pathologic findings.       Catheter Type/Size: 20 G, 1.75 in/4.5 cm quick cath (integral wire)  Narrative         Secured by: anchor securement device       Tegaderm dressing used.       Complications: None apparent,        Arterial waveform: Yes        IBP within 10% of NIBP: Yes

## 2023-04-20 ENCOUNTER — APPOINTMENT (OUTPATIENT)
Dept: MRI IMAGING | Facility: CLINIC | Age: 53
End: 2023-04-20
Payer: COMMERCIAL

## 2023-04-20 ENCOUNTER — APPOINTMENT (OUTPATIENT)
Dept: CT IMAGING | Facility: CLINIC | Age: 53
End: 2023-04-20
Payer: COMMERCIAL

## 2023-04-20 ENCOUNTER — APPOINTMENT (OUTPATIENT)
Dept: PHYSICAL THERAPY | Facility: CLINIC | Age: 53
End: 2023-04-20
Payer: COMMERCIAL

## 2023-04-20 VITALS
HEART RATE: 79 BPM | SYSTOLIC BLOOD PRESSURE: 140 MMHG | OXYGEN SATURATION: 97 % | TEMPERATURE: 98.4 F | DIASTOLIC BLOOD PRESSURE: 80 MMHG | BODY MASS INDEX: 25.2 KG/M2 | HEIGHT: 72 IN | RESPIRATION RATE: 10 BRPM | WEIGHT: 186.07 LBS

## 2023-04-20 LAB
ANION GAP SERPL CALCULATED.3IONS-SCNC: 11 MMOL/L (ref 7–15)
ANION GAP SERPL CALCULATED.3IONS-SCNC: 14 MMOL/L (ref 7–15)
BUN SERPL-MCNC: 10.3 MG/DL (ref 6–20)
BUN SERPL-MCNC: 12.2 MG/DL (ref 6–20)
CALCIUM SERPL-MCNC: 8.2 MG/DL (ref 8.6–10)
CALCIUM SERPL-MCNC: 8.5 MG/DL (ref 8.6–10)
CHLORIDE SERPL-SCNC: 105 MMOL/L (ref 98–107)
CHLORIDE SERPL-SCNC: 107 MMOL/L (ref 98–107)
CREAT SERPL-MCNC: 0.77 MG/DL (ref 0.67–1.17)
CREAT SERPL-MCNC: 0.79 MG/DL (ref 0.67–1.17)
DEPRECATED HCO3 PLAS-SCNC: 20 MMOL/L (ref 22–29)
DEPRECATED HCO3 PLAS-SCNC: 21 MMOL/L (ref 22–29)
ERYTHROCYTE [DISTWIDTH] IN BLOOD BY AUTOMATED COUNT: 13.9 % (ref 10–15)
ERYTHROCYTE [DISTWIDTH] IN BLOOD BY AUTOMATED COUNT: 13.9 % (ref 10–15)
ERYTHROCYTE [DISTWIDTH] IN BLOOD BY AUTOMATED COUNT: 14 % (ref 10–15)
GFR SERPL CREATININE-BSD FRML MDRD: >90 ML/MIN/1.73M2
GFR SERPL CREATININE-BSD FRML MDRD: >90 ML/MIN/1.73M2
GLUCOSE BLDC GLUCOMTR-MCNC: 185 MG/DL (ref 70–99)
GLUCOSE SERPL-MCNC: 157 MG/DL (ref 70–99)
GLUCOSE SERPL-MCNC: 177 MG/DL (ref 70–99)
HCT VFR BLD AUTO: 41.5 % (ref 40–53)
HCT VFR BLD AUTO: 42 % (ref 40–53)
HCT VFR BLD AUTO: 43.7 % (ref 40–53)
HGB BLD-MCNC: 13.6 G/DL (ref 13.3–17.7)
HGB BLD-MCNC: 13.7 G/DL (ref 13.3–17.7)
HGB BLD-MCNC: 14 G/DL (ref 13.3–17.7)
MAGNESIUM SERPL-MCNC: 1.8 MG/DL (ref 1.7–2.3)
MCH RBC QN AUTO: 29.6 PG (ref 26.5–33)
MCH RBC QN AUTO: 29.6 PG (ref 26.5–33)
MCH RBC QN AUTO: 30.1 PG (ref 26.5–33)
MCHC RBC AUTO-ENTMCNC: 32 G/DL (ref 31.5–36.5)
MCHC RBC AUTO-ENTMCNC: 32.4 G/DL (ref 31.5–36.5)
MCHC RBC AUTO-ENTMCNC: 33 G/DL (ref 31.5–36.5)
MCV RBC AUTO: 91 FL (ref 78–100)
MCV RBC AUTO: 92 FL (ref 78–100)
MCV RBC AUTO: 92 FL (ref 78–100)
PHOSPHATE SERPL-MCNC: 2.9 MG/DL (ref 2.5–4.5)
PLATELET # BLD AUTO: 158 10E3/UL (ref 150–450)
PLATELET # BLD AUTO: 161 10E3/UL (ref 150–450)
PLATELET # BLD AUTO: 163 10E3/UL (ref 150–450)
POTASSIUM SERPL-SCNC: 3.8 MMOL/L (ref 3.4–5.3)
POTASSIUM SERPL-SCNC: 3.9 MMOL/L (ref 3.4–5.3)
RBC # BLD AUTO: 4.55 10E6/UL (ref 4.4–5.9)
RBC # BLD AUTO: 4.59 10E6/UL (ref 4.4–5.9)
RBC # BLD AUTO: 4.73 10E6/UL (ref 4.4–5.9)
SODIUM SERPL-SCNC: 139 MMOL/L (ref 136–145)
SODIUM SERPL-SCNC: 139 MMOL/L (ref 136–145)
WBC # BLD AUTO: 6.5 10E3/UL (ref 4–11)
WBC # BLD AUTO: 6.5 10E3/UL (ref 4–11)
WBC # BLD AUTO: 9.3 10E3/UL (ref 4–11)

## 2023-04-20 PROCEDURE — 250N000013 HC RX MED GY IP 250 OP 250 PS 637

## 2023-04-20 PROCEDURE — 84100 ASSAY OF PHOSPHORUS: CPT

## 2023-04-20 PROCEDURE — 250N000012 HC RX MED GY IP 250 OP 636 PS 637

## 2023-04-20 PROCEDURE — 97530 THERAPEUTIC ACTIVITIES: CPT | Mod: GP

## 2023-04-20 PROCEDURE — 70553 MRI BRAIN STEM W/O & W/DYE: CPT

## 2023-04-20 PROCEDURE — 85027 COMPLETE CBC AUTOMATED: CPT

## 2023-04-20 PROCEDURE — 70450 CT HEAD/BRAIN W/O DYE: CPT

## 2023-04-20 PROCEDURE — 999N000111 HC STATISTIC OT IP EVAL DEFER

## 2023-04-20 PROCEDURE — 97161 PT EVAL LOW COMPLEX 20 MIN: CPT | Mod: GP

## 2023-04-20 PROCEDURE — 97116 GAIT TRAINING THERAPY: CPT | Mod: GP

## 2023-04-20 PROCEDURE — 70553 MRI BRAIN STEM W/O & W/DYE: CPT | Mod: 26 | Performed by: STUDENT IN AN ORGANIZED HEALTH CARE EDUCATION/TRAINING PROGRAM

## 2023-04-20 PROCEDURE — 999N000015 HC STATISTIC ARTERIAL MONITORING DAILY

## 2023-04-20 PROCEDURE — A9585 GADOBUTROL INJECTION: HCPCS | Performed by: NEUROLOGICAL SURGERY

## 2023-04-20 PROCEDURE — 36415 COLL VENOUS BLD VENIPUNCTURE: CPT

## 2023-04-20 PROCEDURE — 80048 BASIC METABOLIC PNL TOTAL CA: CPT

## 2023-04-20 PROCEDURE — 70450 CT HEAD/BRAIN W/O DYE: CPT | Mod: 26 | Performed by: RADIOLOGY

## 2023-04-20 PROCEDURE — 250N000011 HC RX IP 250 OP 636

## 2023-04-20 PROCEDURE — 83735 ASSAY OF MAGNESIUM: CPT

## 2023-04-20 PROCEDURE — 255N000002 HC RX 255 OP 636: Performed by: NEUROLOGICAL SURGERY

## 2023-04-20 PROCEDURE — 85027 COMPLETE CBC AUTOMATED: CPT | Performed by: NEUROLOGICAL SURGERY

## 2023-04-20 RX ORDER — GADOBUTROL 604.72 MG/ML
8.5 INJECTION INTRAVENOUS ONCE
Status: COMPLETED | OUTPATIENT
Start: 2023-04-20 | End: 2023-04-20

## 2023-04-20 RX ORDER — DEXAMETHASONE 2 MG/1
2 TABLET ORAL EVERY 8 HOURS SCHEDULED
Status: DISCONTINUED | OUTPATIENT
Start: 2023-05-04 | End: 2023-04-20 | Stop reason: HOSPADM

## 2023-04-20 RX ORDER — AMOXICILLIN 250 MG
1 CAPSULE ORAL 2 TIMES DAILY
Qty: 28 TABLET | Refills: 0 | Status: SHIPPED | OUTPATIENT
Start: 2023-04-20 | End: 2023-05-01

## 2023-04-20 RX ORDER — ACETAMINOPHEN 325 MG/1
975 TABLET ORAL EVERY 6 HOURS PRN
Qty: 30 TABLET | Refills: 0 | Status: SHIPPED | OUTPATIENT
Start: 2023-04-20 | End: 2023-05-01

## 2023-04-20 RX ORDER — DEXAMETHASONE 1 MG
1 TABLET ORAL EVERY 8 HOURS SCHEDULED
Status: DISCONTINUED | OUTPATIENT
Start: 2023-05-11 | End: 2023-04-20 | Stop reason: HOSPADM

## 2023-04-20 RX ORDER — DEXAMETHASONE 1 MG
1 TABLET ORAL EVERY 8 HOURS
Qty: 21 TABLET | Refills: 0 | Status: SHIPPED | OUTPATIENT
Start: 2023-05-11 | End: 2023-04-20

## 2023-04-20 RX ORDER — ONDANSETRON 4 MG/1
4 TABLET, ORALLY DISINTEGRATING ORAL EVERY 6 HOURS PRN
Qty: 15 TABLET | Refills: 0 | Status: SHIPPED | OUTPATIENT
Start: 2023-04-20

## 2023-04-20 RX ORDER — DEXAMETHASONE 1.5 MG/1
3 TABLET ORAL EVERY 8 HOURS SCHEDULED
Status: DISCONTINUED | OUTPATIENT
Start: 2023-04-27 | End: 2023-04-20 | Stop reason: HOSPADM

## 2023-04-20 RX ORDER — MAGNESIUM OXIDE 400 MG/1
400 TABLET ORAL EVERY 4 HOURS
Status: COMPLETED | OUTPATIENT
Start: 2023-04-20 | End: 2023-04-20

## 2023-04-20 RX ORDER — DEXAMETHASONE 4 MG/1
4 TABLET ORAL EVERY 8 HOURS SCHEDULED
Status: DISCONTINUED | OUTPATIENT
Start: 2023-04-20 | End: 2023-04-20 | Stop reason: HOSPADM

## 2023-04-20 RX ORDER — DEXAMETHASONE 1 MG
TABLET ORAL
Qty: 221 TABLET | Refills: 0 | Status: SHIPPED | OUTPATIENT
Start: 2023-04-20 | End: 2023-05-25

## 2023-04-20 RX ORDER — CYCLOBENZAPRINE HCL 10 MG
10 TABLET ORAL EVERY 8 HOURS PRN
Qty: 15 TABLET | Refills: 0 | Status: SHIPPED | OUTPATIENT
Start: 2023-04-20 | End: 2023-05-01

## 2023-04-20 RX ORDER — DEXAMETHASONE 2 MG/1
2 TABLET ORAL EVERY 8 HOURS
Qty: 21 TABLET | Refills: 0 | Status: SHIPPED | OUTPATIENT
Start: 2023-05-04 | End: 2023-04-20

## 2023-04-20 RX ORDER — DEXAMETHASONE 4 MG/1
4 TABLET ORAL EVERY 8 HOURS
Qty: 21 TABLET | Refills: 0 | Status: SHIPPED | OUTPATIENT
Start: 2023-04-20 | End: 2023-04-20

## 2023-04-20 RX ORDER — CEPHALEXIN 500 MG/1
500 CAPSULE ORAL 3 TIMES DAILY
Qty: 21 CAPSULE | Refills: 0 | Status: SHIPPED | OUTPATIENT
Start: 2023-04-20 | End: 2023-04-27

## 2023-04-20 RX ORDER — POLYETHYLENE GLYCOL 3350 17 G/17G
17 POWDER, FOR SOLUTION ORAL DAILY
Qty: 510 G | Refills: 0 | Status: SHIPPED | OUTPATIENT
Start: 2023-04-20 | End: 2023-05-01

## 2023-04-20 RX ORDER — DEXAMETHASONE 1.5 MG/1
3 TABLET ORAL EVERY 8 HOURS
Qty: 42 TABLET | Refills: 0 | Status: SHIPPED | OUTPATIENT
Start: 2023-04-27 | End: 2023-04-20

## 2023-04-20 RX ORDER — OXYCODONE HYDROCHLORIDE 5 MG/1
5 TABLET ORAL EVERY 4 HOURS PRN
Qty: 15 TABLET | Refills: 0 | Status: SHIPPED | OUTPATIENT
Start: 2023-04-20 | End: 2023-05-01

## 2023-04-20 RX ORDER — DEXAMETHASONE 0.5 MG/1
0.5 TABLET ORAL EVERY 8 HOURS
Qty: 21 TABLET | Refills: 0 | Status: SHIPPED | OUTPATIENT
Start: 2023-05-18 | End: 2023-04-20

## 2023-04-20 RX ORDER — DEXAMETHASONE 0.5 MG/1
0.5 TABLET ORAL EVERY 8 HOURS SCHEDULED
Status: DISCONTINUED | OUTPATIENT
Start: 2023-05-18 | End: 2023-04-20 | Stop reason: HOSPADM

## 2023-04-20 RX ADMIN — ACETAMINOPHEN 975 MG: 325 TABLET ORAL at 14:26

## 2023-04-20 RX ADMIN — ACETAMINOPHEN 975 MG: 325 TABLET ORAL at 06:20

## 2023-04-20 RX ADMIN — MAGNESIUM OXIDE TAB 400 MG (241.3 MG ELEMENTAL MG) 400 MG: 400 (241.3 MG) TAB at 08:51

## 2023-04-20 RX ADMIN — DEXAMETHASONE 4 MG: 4 TABLET ORAL at 08:51

## 2023-04-20 RX ADMIN — LEVETIRACETAM 1000 MG: 500 TABLET, FILM COATED ORAL at 08:51

## 2023-04-20 RX ADMIN — CEFAZOLIN 1 G: 1 INJECTION, POWDER, FOR SOLUTION INTRAMUSCULAR; INTRAVENOUS at 06:23

## 2023-04-20 RX ADMIN — DEXAMETHASONE 4 MG: 4 TABLET ORAL at 14:27

## 2023-04-20 RX ADMIN — MAGNESIUM OXIDE TAB 400 MG (241.3 MG ELEMENTAL MG) 400 MG: 400 (241.3 MG) TAB at 06:20

## 2023-04-20 RX ADMIN — GADOBUTROL 8.5 ML: 604.72 INJECTION INTRAVENOUS at 12:54

## 2023-04-20 RX ADMIN — CEFAZOLIN 1 G: 1 INJECTION, POWDER, FOR SOLUTION INTRAMUSCULAR; INTRAVENOUS at 14:48

## 2023-04-20 ASSESSMENT — ACTIVITIES OF DAILY LIVING (ADL)
ADLS_ACUITY_SCORE: 26

## 2023-04-20 ASSESSMENT — VISUAL ACUITY
OU: NORMAL ACUITY

## 2023-04-20 NOTE — PHARMACY-ADMISSION MEDICATION HISTORY
Pharmacist Admission Medication History    Admission medication history is complete. The information provided in this note is only as accurate as the sources available at the time of the update.    Medication reconciliation/reorder completed by provider prior to medication history? No    Information Source(s): Patient and CareEverywhere/SureScripts via in-person    Pertinent Information: Patient has expressive aphasia and has difficulty naming medications, but can describe how he takes medications if prompted. The only medication he was requiring PTAM was keppra.     Changes made to PTA medication list:    Added: None    Deleted: None    Changed: None    Allergies reviewed with patient and updates made in EHR: yes    Medication History Completed By: Hardeep Jackson RPH 4/20/2023 11:14 AM    Prior to Admission medications    Medication Sig Last Dose Taking? Auth Provider Long Term End Date   levETIRAcetam (KEPPRA) 1000 MG tablet Take 1 tablet (1,000 mg) by mouth 2 times daily 4/19/2023 at 0930 Yes Shirin Stone MD Yes

## 2023-04-20 NOTE — PLAN OF CARE
Physical Therapy Discharge Summary    Reason for therapy discharge:    Discharged to home.    Progress towards therapy goal(s). See goals on Care Plan in [facility] electronic health record for goal details.  Goals met    Therapy recommendation(s):    No further therapy is recommended.    Goal Outcome Evaluation:    Plan of Care Reviewed With: patient

## 2023-04-20 NOTE — PROGRESS NOTES
Major Shift Events: Patient arrived to unit at 2200, stable on arrival.    POD#1 s/p craniotomy for tumor resection    Neuro: A&OX4, moves all extremities, +5 strength, PERRLA, Expressive aphasia  CV: SR overnight, MAPs>65, pulses palpable  Resp: 1L O2 NC, satts>90%, LCTA  GI/: Rose cathter removed this AM, voided since w/o issues. No BM overnight. Tolerated fluids, Regular diet order placed this morning.  Lines/gtts: R radial art line, PIV X2, NS@80ml/hr  Skin: L crani incision site    Plan: Continue to monitor, update team w/any acute changes in patient condition  For vital signs and complete assessments, please see documentation flowsheets.

## 2023-04-20 NOTE — OR NURSING
Neurosurgeon at bedside. Neurological assessment completed. No immediate concerns expressed. AVSS.

## 2023-04-20 NOTE — CONSULTS
Neurocritical Care Progress Note    Reason for critical care admission: Post craniotomy for glioblastoma multiforme  Admitting Team: TEETEE  Date of Service:  04/19/2023  Date of Admission:  4/19/2023  Hospital Day: 1    Assessment/Plan  Laz Stein is a 53 year old male with a past medical history of hypertension, anxiety, hypercholesterolemia admitted on 4/19/2023 for craniotomy for glioblastoma multiforme.    Neuro  #Glioblastoma multiforme status post L craniotomy  -Neurochecks every 1 hrs  -SBP goal < 140 mmHg  -HOB > 30   -PT/OT/SLP  -Post CT head stable  -Tylenol 975 mg q8 hours  - Dexamethasone per NSGY  - Keppra 1000 mg BID  - PRN dilaudid and oxycodone for pain    #Analgesics & sedation  - None    CV  #HTN  -Cardiac monitoring  -SBP goal < 140 mmHg  -PRN Labetalol and Hydralazine    Resp  #OTILIO  Oxygen/vent:  -Continuous pulse ox  -Maintain O2 saturations greater than 92%    GI  #OTILIO  Diet:Advance as tolerated  Last BM:Unknown  GI prophylaxis:Not indicated  -Bowel regimen: scheduled senna-docusate and Miralax    Renal/  #OTILIO  -Daily BMP  -IV fluids: Normal saline @80ml/hr  -Electrolyte replacement protocol    Endo  #OTILIO  -Monitor glucose levels    Heme  #OTILIO  -Post op CBC  -Daily CBC  -Hgb goal >7, plt goal >50k  -Transfuse to meet Hgb and plt goals    ID  #OTILIO  -Daily CBC  -Follow temperature curve  - Perioperative cefazolin 1G q8hrs    ICU Checklist  Lines/tubes/drains:PIV, Art line  FEN:Advance diet as tolerated  PPX: DVT - SCDs; GI - Not indicated.  Code:Full Code  Dispo: ICU - NCC    The patient was discussed with the NCC fellow Dr. Villavicencio, DEBORA attending is Dr. Morataya.    Dipak Marcelo MD  Neurology Resident PGY-2    HPI  53-year-old with past medical history of hypertension, hypercholesterolemia and anxiety who was admitted on April 19 for optical tracking system L craniotomy excision of glioblastoma multiforme.    24 Hour Vital Signs Summary:  Temp: 97.9  F (36.6  C) Temp  Min: 97.7  F  (36.5  C)  Max: 98  F (36.7  C)  Resp: 20 Resp  Min: 12  Max: 20  SpO2: 98 % SpO2  Min: 94 %  Max: 100 %  Pulse: 70 Pulse  Min: 66  Max: 82    No data recorded  BP: 122/66 Systolic (24hrs), Av , Min:117 , Max:126   Diastolic (24hrs), Av, Min:63, Max:83      Respiratory monitoring:   Resp: 20      I/O last 3 completed shifts:  In: 2296.67 [I.V.:2296.67]  Out: 865 [Urine:840; Blood:25]    Current Medications:    acetaminophen  975 mg Oral Q8H     ceFAZolin  1 g Intravenous Q8H     dexamethasone  4 mg Oral BID    Followed by     [START ON 2023] dexamethasone  2 mg Oral BID    Followed by     [START ON 2023] dexamethasone  1 mg Oral BID    Followed by     [START ON 2023] dexamethasone  1 mg Oral Daily     levETIRAcetam  1,000 mg Oral BID     [START ON 2023] polyethylene glycol  17 g Oral Daily     senna-docusate  1 tablet Oral BID     sodium chloride (PF)  3 mL Intracatheter Q8H       PRN Medications:  [START ON 2023] acetaminophen, bisacodyl, cyclobenzaprine, hydrALAZINE, HYDROmorphone **OR** HYDROmorphone, labetalol, lidocaine 4%, lidocaine (buffered or not buffered), magnesium hydroxide, naloxone **OR** naloxone **OR** naloxone **OR** naloxone, ondansetron **OR** ondansetron, oxyCODONE **OR** oxyCODONE, prochlorperazine **OR** prochlorperazine, sodium chloride (PF)    Infusions:    sodium chloride 80 mL/hr at 23       No Known Allergies    Physical Examination:  Vitals: /66   Pulse 70   Temp 97.9  F (36.6  C) (Oral)   Resp 20   Ht 1.829 m (6')   Wt 84 kg (185 lb 3 oz)   SpO2 98%   BMI 25.12 kg/m    General: Adult male patient, lying in bed, critically-ill  HEENT: Normocephalic, atraumatic, no icterus, oral cavity/oropharynx pink and moist  Cardiac: RRR, s1/s2 auscultated without murmur  Pulm: CTAB, unlabored, expansion symmetric, no retractions or use of accessory muscles  Abdomen: Soft, non-distended, bowel sounds present  Extremities: Warm, no edema, distal  pulses +2, well perfused  Skin: No rash or lesion  Psych: Calm and cooperative  Neuro:  Mental status: Awake, alert, attentive, unable to answer orientation questions says '[place]' '[place]' and was unable to pick from options. Language is non fluent, repetition, naming, reading, comprehension are impaired, likely has global aphasia.  Cranial nerves: VFF, PERRL, conjugate gaze, EOMI, facial sensation intact, face symmetric, shoulder shrug strong, tongue midline, no dysarthria.   Motor: Normal bulk and tone. No abnormal movements. 5/5 strength in 4/4 extremities.   Sensory: Intact to light touch x 4 extremities  Coordination: FNF and HS without ataxia or dysmetria.   Gait: MIKE, deferred.      NIHSS  1a. Level of Consciousness 0-->Alert, keenly responsive   1b. LOC Questions 0-->Answers both questions correctly   1c. LOC Commands 0-->Performs both tasks correctly   2.   Best Gaze 0-->Normal   3.   Visual 0-->No visual loss   4.   Facial Palsy 0-->Normal symmetrical movements   5a. Motor Arm, Left 0-->No drift, limb holds 90 (or 45) degrees for full 10 secs   5b. Motor Arm, Right 0-->No drift, limb holds 90 (or 45) degrees for full 10 secs   6a. Motor Leg, Left 0-->No drift, leg holds 30 degree position for full 5 secs   6b. Motor Leg, right 0-->No drift, leg holds 30 degree position for full 5 secs   7.   Limb Ataxia 0-->Absent   8.   Sensory 0-->Normal, no sensory loss   9.   Best Language 2-->Severe aphasia, all communication is through fragmentary expression, great need for inference, questioning, and guessing by the listener. Range of information that can be exchanged is limited, listener carries burden of. . . (see row details)   10. Dysarthria 0-->Normal   11. Extinction and Inattention  0-->No abnormality   Total 2 (04/19/23 6843)       Labs and Imaging:    Results for orders placed or performed during the hospital encounter of 04/19/23 (from the past 24 hour(s))   Glucose by meter   Result Value Ref Range     GLUCOSE BY METER POCT 109 (H) 70 - 99 mg/dL   ABO/Rh type and screen    Narrative    The following orders were created for panel order ABO/Rh type and screen.  Procedure                               Abnormality         Status                     ---------                               -----------         ------                     Adult Type and Screen[[id number]]                            Final result                 Please view results for these tests on the individual orders.   Adult Type and Screen   Result Value Ref Range    ABO/RH(D) A POS     Antibody Screen Negative Negative    SPECIMEN EXPIRATION DATE [id number]    Surgical Pathology Exam   Result Value Ref Range    Case Report       Surgical Pathology Report                         Case: [id number]                                  Authorizing Provider:  Tanner Reese MD Collected:           04/19/2023 04:03 PM          Ordering Location:     [facility] OR                 Received:            04/19/2023 04:15 PM          Pathologist:           Olaf Hart MD                                                      Intraop:               Olaf Hart MD                                                      Specimen:    Brain, brain tumor #1                                                                      Intraoperative Consultation       A(1). Brain, brain tumor #1:  AFS1:  Tumor cells present with rare mitoses along with treatment effect           CT Head w/o Contrast    Narrative    CT HEAD W/O CONTRAST 4/19/2023 7:00 PM    History: s/p crani for tumor     Comparison: MR brain from same date. MRI brain 4/3/2023    Technique: Using multidetector thin collimation helical acquisition  technique, axial, coronal and sagittal CT images from the skull base  to the vertex were obtained without intravenous contrast.   (topogram) image(s) also obtained and reviewed.    Findings:   Postsurgical changes of left craniotomy and  underlying resection  cavity in the left parietal lobe. Mixed fluid and gas in the left  parietal resection cavity. Minimal vasogenic edema in the adjacent  white matter. Thin extra-axial fluid and air collection deep to the  craniotomy site measuring up to 4 mm in thickness. No significant mass  effect or midline shift.    No acute loss of gray-white matter differentiation. The basal cisterns  are patent. The orbits are grossly normal.    The paranasal sinuses are relatively clear. The mastoid air cells are  clear.      Impression    Impression:  Expected early postsurgical changes of left craniotomy and underlying  parietal lobe mass resection.     I have personally reviewed the examination and initial interpretation  and I agree with the findings.    ELLIOT DEVLIN MD         SYSTEM ID:  [id number]   Glucose by meter   Result Value Ref Range    GLUCOSE BY METER POCT 145 (H) 70 - 99 mg/dL       All relevant imaging and laboratory values personally reviewed.

## 2023-04-20 NOTE — ANESTHESIA POSTPROCEDURE EVALUATION
Patient: Laz Stein    Procedure: Procedure(s):  Optical tracking system craniotomy, excise tumor       Anesthesia Type:  General    Note:  Disposition: Admission   Postop Pain Control: Uneventful            Sign Out: Well controlled pain   PONV: No   Neuro/Psych: Uneventful            Sign Out: Acceptable/Baseline neuro status   Airway/Respiratory: Uneventful            Sign Out: Acceptable/Baseline resp. status   CV/Hemodynamics: Uneventful            Sign Out: Acceptable CV status; No obvious hypovolemia; No obvious fluid overload   Other NRE: NONE   DID A NON-ROUTINE EVENT OCCUR? No           Last vitals:  Vitals Value Taken Time   /70 04/19/23 2030   Temp 36.6  C (97.8  F) 04/19/23 1930   Pulse 79 04/19/23 2055   Resp 18 04/19/23 2015   SpO2 97 % 04/19/23 2055   Vitals shown include unvalidated device data.    Electronically Signed By: Emilee Carter MD  April 19, 2023  8:57 PM

## 2023-04-20 NOTE — PROGRESS NOTES
Neurocritical Care Multi-Disciplinary Note    Reason for critical care admission: post craniotomy for tumor resection   Primary Team: TEETEE  Date of Service:  04/20/2023  Date of Admission:  4/19/2023  Hospital Day: 2    Patient condition reviewed and discussed while on multidisciplinary rounds today. Please note these minor interventions that were initiated:  1. None.     The Neurocritical Care service will continue to follow peripherally while the patient is within the ICU. We are readily available should issues arise. Please feel free to contact us for critical care issues with which we may be of assistance. For all other concerns, please contact primary service first.     Please contact NCC team phone at *[phone].    RAVEN Jennings, CNP  Neurocritical Care *[phone]

## 2023-04-20 NOTE — OR NURSING
Pt met criteria at 1930. Okayed by dr wynn for verbal sign out. Maria Luz Guerrero RN on 4/19/2023 at 8:01 PM

## 2023-04-20 NOTE — OR NURSING
Pt from OR with aphasia and word finding difficulty. Neurosurgery at bedside and stat Ct ordered. Pt taken to CT and nauseated with movement. Par doc okayed 4mg of zofran on top of 8mg pt received in OR. Nausea resolved. Per neurosurgery, no changes on imaging and recover pt per orders. Maria Luz Guerrero RN on 4/19/2023 at 7:05 PM

## 2023-04-20 NOTE — PROGRESS NOTES
04/20/23 1115   Appointment Info   Signing Clinician's Name / Credentials (PT) Armida Manning DPT       Present no   Living Environment   People in Home spouse   Current Living Arrangements house   Home Accessibility stairs to enter home   Number of Stairs, Main Entrance 3   Stair Railings, Main Entrance railing on right side (ascending)   Transportation Anticipated family or friend will provide   Living Environment Comments Pt lives in [place] w/ his wife who is able to provide assistance.   Self-Care   Usual Activity Tolerance excellent   Current Activity Tolerance good   Regular Exercise No   Equipment Currently Used at Home none   Fall history within last six months no   Activity/Exercise/Self-Care Comment IND at baseline, works as a farmer.   General Information   Onset of Illness/Injury or Date of Surgery 04/19/23   Referring Physician Lauren Hou MD   Existing Precautions/Restrictions fall;other (see comments)  (crani)   Weight-Bearing Status - LUE partial weight-bearing (% in comments)  (<10#)   Weight-Bearing Status - RUE partial weight-bearing (% in comments)  (<10#)   Heart Disease Risk Factors Age;Medical history   General Observations moderate expressive aphasia. receptive appearing intact. Runs into objects on R side occ, screened vision, no deficits noted   Cognition   Affect/Mental Status (Cognition) WNL   Orientation Status (Cognition) oriented x 4   Follows Commands (Cognition) WNL   Pain Assessment   Patient Currently in Pain Yes, see Vital Sign flowsheet  (incisional pain)   Integumentary/Edema   Integumentary/Edema no deficits were identifed   Posture    Posture Not impaired   Range of Motion (ROM)   Range of Motion ROM is WNL   Strength (Manual Muscle Testing)   Strength (Manual Muscle Testing) strength is WNL   Bed Mobility   Comment, (Bed Mobility) IND   Transfers   Comment, (Transfers) IND   Gait/Stairs (Locomotion)   Comment, (Gait/Stairs) SBA w/o device   Balance    Balance Comments good static and dynamic balance w/o device w/ gait   Sensory Examination   Sensory Perception patient reports no sensory changes   Sensory Perception Comments no deficits identified   Coordination   Coordination no deficits were identified   Muscle Tone   Muscle Tone no deficits were identified   Clinical Impression   Criteria for Skilled Therapeutic Intervention Yes, treatment indicated   PT Diagnosis (PT) impaired functional mobility   Influenced by the following impairments medical status   Functional limitations due to impairments decreased (I) w/ gait, home access   Clinical Presentation (PT Evaluation Complexity) Stable/Uncomplicated   Clinical Presentation Rationale current status, clinical judgement   Clinical Decision Making (Complexity) low complexity   Planned Therapy Interventions (PT) gait training;patient/family education;progressive activity/exercise;risk factor education;home program guidelines   Anticipated Equipment Needs at Discharge (PT)   (none)   Risk & Benefits of therapy have been explained evaluation/treatment results reviewed;care plan/treatment goals reviewed;risks/benefits reviewed;current/potential barriers reviewed;participants voiced agreement with care plan;participants included;patient   Clinical Impression Comments Pt presents w/ new post-op mobility precautions w/ impaired functional IND 2/2 medical status. Will benefit from one time eval and treat to ensure safety with mobility.   PT Total Evaluation Time   PT Darius Low Complexity Minutes (57329) 10   Plan of Care Review   Plan of Care Reviewed With patient   Physical Therapy Goals   PT Frequency One time eval and treatment only   PT Goals Bed Mobility;Transfers;Gait;Stairs;PT Goal 1   PT: Bed Mobility Independent   PT: Transfers Independent   PT: Gait Independent;Greater than 200 feet   PT: Stairs Modified independent;3 stairs;Rail on right   PT: Goal 1 Pt understanding of crani precautions to ensure safety  with mobility.   PT Discharge Planning   PT Discharge Recommendation (DC Rec) home with assist   PT Rationale for DC Rec Pt mobilizing well, okay to discharge home w/ PRN support from wife when medically ready.   PT Brief overview of current status IND transfers and gait   Total Session Time   Total Session Time (sum of timed and untimed services) 10

## 2023-04-20 NOTE — PLAN OF CARE
D/I: Pt neuro exam stable. Vial signs stable. Discharge orders in chart. Orders reviewed with patient and wife. Follow up appointments and concerns identified. Medications ready in pharmacy.   A: Pt ready for discharge. Wife to transport home.   P: Follow up with Dr Reese in clinic. Call neuro surgery with concerns.

## 2023-04-20 NOTE — DISCHARGE SUMMARY
[facility] Discharge Summary and Instructions    Laz Stein MRN# [id number]   Age: 53 year old YOB: 1970     Date of Admission:  4/19/2023  Date of Discharge::  4/20/2023  5:15 PM  Admitting Physician:  Tanner Reese MD  Discharge Physician:  Tanner Reese MD          Admission Diagnoses:   Glioblastoma (H) [C71.9]  Brain compression  Cerebral edema          Discharge Diagnosis:     Glioblastoma (H) [C71.9]  Brain compression  Cerebral edema     Clinically Significant Risk Factors Present on Admission             # Overweight: Estimated body mass index is 25.24 kg/m  as calculated from the following:    Height as of this encounter: 1.829 m (6').    Weight as of this encounter: 84.4 kg (186 lb 1.1 oz).         Procedures:   1) Craniotomy for tumor resection, left  2) Neuro-navigation  3) Microdissection  4) 5ALA-guided resection           Brief History of Illness:   53 M PMH notable for glioblastoma with serial enlargement of a contrast enhancing region adjacent to the previous resection cavity who presents for resection of this tissue, with the goal of definitive tissue diagnosis. Patient has elected to undergo above-mentioned procedure.           Hospital Course:   Patient underwent above-mentioned procedure on 4/19/23. The operation was uncomplicated and he was admitted to the surgical ICU for routine post operative cares. He was found to have some word finding difficulties post-operatively for which he was referred for outpatient speech therapy. Two post-operative CTs were unconcerning for any acute pathology requiring intervention.  On post operative day 1, he was ambulating, voiding without a diggs, eating a regular diet, pain was well controlled and therefore he/she was discharged.          Discharge Medications:     Discharge Medication List as of 4/20/2023  3:55 PM      START taking these medications    Details   acetaminophen (TYLENOL) 325 MG tablet Take 3  tablets (975 mg) by mouth every 6 hours as needed for mild pain, Disp-30 tablet, R-0, E-Prescribe      cephALEXin (KEFLEX) 500 MG capsule Take 1 capsule (500 mg) by mouth 3 times daily for 7 days, Disp-21 capsule, R-0, E-Prescribe      cyclobenzaprine (FLEXERIL) 10 MG tablet Take 1 tablet (10 mg) by mouth every 8 hours as needed for muscle spasms, Disp-15 tablet, R-0, E-Prescribe      ondansetron (ZOFRAN ODT) 4 MG ODT tab Take 1 tablet (4 mg) by mouth every 6 hours as needed for nausea or vomiting, Disp-15 tablet, R-0, E-Prescribe      oxyCODONE (ROXICODONE) 5 MG tablet Take 1 tablet (5 mg) by mouth every 4 hours as needed for moderate pain, Disp-15 tablet, R-0, Local Print      polyethylene glycol (MIRALAX) 17 GM/Dose powder Take 17 g by mouth daily, Disp-510 g, R-0, E-Prescribe      senna-docusate (SENOKOT-S/PERICOLACE) 8.6-50 MG tablet Take 1 tablet by mouth 2 times daily for 14 days, Disp-28 tablet, R-0, E-Prescribe         CONTINUE these medications which have CHANGED    Details   dexamethasone (DECADRON) 1 MG tablet Take 4 tablets (4 mg) by mouth every 8 hours for 7 days, THEN 3 tablets (3 mg) every 8 hours for 7 days, THEN 2 tablets (2 mg) every 8 hours for 7 days, THEN 1 tablet (1 mg) every 8 hours for 7 days, THEN 0.5 tablets (0.5 mg) every 8 hours for 7 days., D isp-221 tablet, R-0, E-Prescribe         CONTINUE these medications which have NOT CHANGED    Details   levETIRAcetam (KEPPRA) 1000 MG tablet Take 1 tablet (1,000 mg) by mouth 2 times daily, Disp-60 tablet, R-11, E-Prescribe      prochlorperazine (COMPAZINE) 10 MG tablet Take 1 tablet (10 mg) by mouth every 6 hours as needed (Nausea/Vomiting), Disp-30 tablet, R-2, E-Prescribe         STOP taking these medications       doxycycline monohydrate (ADOXA) 100 MG tablet Comments:   Reason for Stopping:         study - enzastaurin vs placebo, IDS# [id number], 125 mg TABS tablet Comments:   Reason for Stopping:         study - enzastaurin vs placebo, IDS# [id number],  125 mg TABS tablet Comments:   Reason for Stopping:         temozolomide (TEMODAR) 100 MG capsule Comments:   Reason for Stopping:         temozolomide (TEMODAR) 140 MG capsule Comments:   Reason for Stopping:                       Discharge Instructions and Follow-Up:     Discharge diet: Regular   Discharge activity: You may advance activity as tolerated. No strenuous exercise or heay lifting greater than 10 lbs for 4 weeks or until seen and cleared in clinic.     Discharge follow-up: Follow-up Dr. Tanner Reese MD in about 2 weeks for general post-operative cares and to discuss your surgical pathology results. All additional follow-up visits to be determined by Dr. Tanner Reese MD.     Wound care: Ok to shower starting 4/22/23, however no scrubbing of the wound and no soaking of the wound, meaning no bathtubs or swimming pools. Pat dry only. Leave wound open to air.  Patient to have wound checked 2 weeks following surgery.    Wound location: left scalp  Closure technique: monocryl  Dressing needs: leave open to air  Post-op care at follow-up: Keep dry and clean       Please call if you have:  1. increased pain, redness, drainage, swelling at your incision  2. fevers > 101.5 F degrees  3. with any questions or concerns.  You may reach the Neurosurgery clinic at [phone] during regular work hours. ER at [phone].    and ask for the Neurosurgery Resident on call at [phone], during off hours or weekends.         Discharge Disposition:     Discharged to home        Lauren Hou MD, PhD  PGY-1 Neurosurgery

## 2023-04-20 NOTE — PLAN OF CARE
Occupational Therapy: Orders received. Chart reviewed and discussed with care team.? Occupational Therapy not indicated as pt without acute deficits w/ ADL/IADL completion, PT to follow and all rehab needs able to be met by one discipline.? Defer discharge recommendations to PT.? Will complete orders. Please re-consult if pt experiences a change in status or if further needs are identified.

## 2023-04-21 ENCOUNTER — PATIENT OUTREACH (OUTPATIENT)
Dept: CARE COORDINATION | Facility: CLINIC | Age: 53
End: 2023-04-21
Payer: COMMERCIAL

## 2023-04-21 NOTE — PROGRESS NOTES
Clinic Care Coordination Contact  [facility]: Post-Discharge Note  SITUATION                                                      Admission:    Admission Date: 04/19/23   Reason for Admission: You were admitted to undergo surgery to remove a brain tumor with Dr. Reese.  Discharge:   Discharge Date: 04/20/23  Discharge Diagnosis: You were admitted to undergo surgery to remove a brain tumor with Dr. Reese.    BACKGROUND                                                      Per hospital discharge summary and inpatient provider notes:    53-year-old with past medical history of hypertension, hypercholesterolemia and anxiety who was admitted on April 19 for optical tracking system L craniotomy excision of glioblastoma multiforme.    ASSESSMENT           Discharge Assessment  How are you doing now that you are home?: I think I am doing okay.  How are your symptoms? (Red Flag symptoms escalate to triage hotline per guidelines): Improved  Do you feel your condition is stable enough to be safe at home until your provider visit?: Yes  Does the patient have their discharge instructions? : Yes  Does the patient have questions regarding their discharge instructions? : No  Were you started on any new medications or were there changes to any of your previous medications? : Yes  Does the patient have all of their medications?: Yes  Do you have questions regarding any of your medications? : No  Discharge follow-up appointment scheduled within 14 calendar days? : Yes  Discharge Follow Up Appointment Date: 05/05/23  Discharge Follow Up Appointment Scheduled with?: Specialty Care Provider    Post-op ([facility] Only)  If the patient had a surgery or procedure, do they have any questions for a nurse?: No             PLAN                                                      Outpatient Plan:  You have a follow-up visit scheduled with Dr. Reese on 5/5/23 at 10:00am.    Future Appointments   Date Time Provider Department Center   5/5/2023  10:00 AM Tanner Reese MD [facility]         For any urgent concerns, please contact our 24 hour nurse triage line: [phone])         MAGDI Alcantar  [phone]  [facility]

## 2023-04-21 NOTE — OP NOTE
Name:  Laz Stein  MRN:  [id number]  YOB: 1970  Date of Surgery:  April 19, 2023      Pre-operative Diagnosis: recurrent glioblastoma, left  Post-operative Diagnosis: Same  Procedure:  1) Craniotomy for tumor resection, left  2) Neuro-navigation  3) Microdissection  4) 5ALA-guided resection    Anesthesia: ELIECER    Surgeon: Tanner Ann MD, PhD  Assistant: Freddy Gonsalez MD    Indication: 53 M PMH notable for glioblastoma with serial enlargement of a contrast enhancing region adjacent to the previous resection cavity who presents for resection of this tissue, with the goal of definitive tissue diagnosis..    Description of Procedure: After pre-operative laboratory value and informed consent were verified, the patient was brought into the operating room. The patient underwent general anesthesia and intubation. Antibiotic was administered.    The patient was placed in a supine position, with the head turned to the right, exposing the left temporal cranium.  The GlucoTec Stealth reference frame was placed. The patient's anatomy was registered relative to the pre-operative MRI using the Wortalalth system.    The region overlying the tumor was identified. An incision was planned based on the location of the previous incision. The area encompassing the surgical incision was prepped and draped in a sterile manner.     Time out was performed. The previous incision was re-opened and extended posteriorly using a 10 blade. Hemostasis was achieved using a combination of cautery and Jared clips. The incision was retracted using a cerebellar retractor.  The region overlying the tumor was confirmed using the Stealth probe.    The previous Domenica hole was identified and extended into a craniotomy.  Dural bleeding was controlled. The epidural space was packed with Surgi-flow.    The dura was opened into an inverted U flap.  Corticectomy was performed in the region immediate superficial to the lesion.  The tumor specimen was immediately apparent. A biopsy was taken and sent to pathology.     Frozen pathology findings are consistent with recurrent glioblastoma .    The plane between the tumor capsule and the normal cerebrum was identified. This plane was developed. Through a combination of tumor debulking and plane development, the dissection as carried out until the entirety of the visible lesion was removed.     The microscope was then brought in to facilitate 5ALA guided resection.  Additional removal of 5ALA florescent cerebrum was achieved. By the end of the resection, no visible 5ALA florescence was present.    Meticulous hemostasis was achieved after the tumor resection. The resection cavity was overlaid with surgicel.    After hemostasis, I turned my attention to the closure. The dura was closed was Duragen. The craniotomy flap was secured using titanium plate/screw construct. The wound was amply irrigated with normal saline. The galea was closed with 2'0 vicryl. The skin was closed with 3'0 Monocryl. Exofin was applied.    I was present and performed the key portions of the procedure.    EBL: 50 cc's    Specimens: resected tumor specimen    Disposition: ICU

## 2023-04-24 LAB
PATH REPORT.COMMENTS IMP SPEC: ABNORMAL
PATH REPORT.COMMENTS IMP SPEC: YES
PATH REPORT.FINAL DX SPEC: ABNORMAL
PATH REPORT.GROSS SPEC: ABNORMAL
PATH REPORT.INTRAOP OBS SPEC DOC: ABNORMAL
PATH REPORT.MICROSCOPIC SPEC OTHER STN: ABNORMAL
PATH REPORT.RELEVANT HX SPEC: ABNORMAL
PHOTO IMAGE: ABNORMAL

## 2023-04-24 NOTE — TUMOR CONFERENCE
Tumor Conference Information  Tumor Conference: Neuro-Onc  Specialties Present: Medical oncology, Radiation oncology, Pathology, Radiology, Surgery  Patient Status: Prospective  Stage: GBM  Treatment to Date: Surgery, Chemotherapy, Radiation  Clinical Trials: Not discussed  Genetic Testing Discussed/Recommended?: No  Supportive Care Services Discussed/Recommended?: No  Recommended Plan: Follows evidence-based guidelines (Comment: discuss surgery, NGS testing)  Did the review exceed 30 minutes?: did not           Documentation / Disclaimer Cancer Tumor Board Note  Cancer tumor board recommendations do not override what is determined to be reasonable care and treatment, which is dependent on the circumstances of a patient's case; the patient's medical, social, and personal concerns; and the clinical judgment of the oncologist [physician].

## 2023-05-01 ENCOUNTER — VIRTUAL VISIT (OUTPATIENT)
Dept: ONCOLOGY | Facility: CLINIC | Age: 53
End: 2023-05-01
Attending: PSYCHIATRY & NEUROLOGY
Payer: COMMERCIAL

## 2023-05-01 ENCOUNTER — TUMOR CONFERENCE (OUTPATIENT)
Dept: ONCOLOGY | Facility: CLINIC | Age: 53
End: 2023-05-01
Payer: COMMERCIAL

## 2023-05-01 DIAGNOSIS — C71.9 GLIOBLASTOMA (H): Primary | ICD-10-CM

## 2023-05-01 PROCEDURE — G0463 HOSPITAL OUTPT CLINIC VISIT: HCPCS | Mod: PN,GT | Performed by: PSYCHIATRY & NEUROLOGY

## 2023-05-01 PROCEDURE — 99215 OFFICE O/P EST HI 40 MIN: CPT | Mod: VID | Performed by: PSYCHIATRY & NEUROLOGY

## 2023-05-01 NOTE — PROGRESS NOTES
"Virtual Visit Details  Type of service:  Video Visit     Start time: 4:40 PM  End time: 5:06 PM    Originating Location (pt. Location): Home  Distant Location (provider location):  On-site  Platform used for Video Visit: Omar     __________________________________________________________________    NEURO-ONCOLOGY VISIT  May 1, 2023    CHIEF COMPLAINT: Mr. Nesbitt \"Matt\" DEBBIE Stein is a 53 year old right-handed man with a left parietal glioblastoma (IDH 1/ 2 wildtype, MGMT promoter unmethylated), diagnosed following resection on 7/20/2022. He has since completed upfront chemoradiotherapy per the Denovo clinical trial (Randomized, Double-Blind, Placebo-Controlled Phase 3 Study of Enzastaurin Added to Temozolomide During and Following Radiation Therapy in Newly Diagnosed Glioblastoma Patients Who Possess the Novel Genomic Biomarker DGM1) as of 10/17/2022.    He then started cycle 1 of the adjuvant phase of the trial with temozolomide +/- Enzastaurin on 11/16/2022. Treatment was continued until 3/2023 when imaging demonstrated changes that were concerning for cancer recurrence. On 4/19/2023, Matt underwent a second craniotomy and the resulting pathology was consistent with cancer recurrence, however, there was significant treatment effect noted.     Matt presents today in follow-up accompanied by Tracey (wife).      HISTORY OF PRESENT ILLNESS  -Matt has recovered well from surgery, but continues to have much difficulty with word-finding. Word-finding is more challenging when fatigued or frustrated. Denies headaches/ head pain.   -Mild numbness of the right leg since surgery.   -No vision changes, dizziness, or focal weakness.      MEDICATIONS   Current Outpatient Medications   Medication Sig Dispense Refill     dexamethasone (DECADRON) 1 MG tablet Take 4 tablets (4 mg) by mouth every 8 hours for 7 days, THEN 3 tablets (3 mg) every 8 hours for 7 days, THEN 2 tablets (2 mg) every 8 hours for 7 days, THEN 1 tablet (1 " mg) every 8 hours for 7 days, THEN 0.5 tablets (0.5 mg) every 8 hours for 7 days. 221 tablet 0     levETIRAcetam (KEPPRA) 1000 MG tablet Take 1 tablet (1,000 mg) by mouth 2 times daily 60 tablet 11     ondansetron (ZOFRAN ODT) 4 MG ODT tab Take 1 tablet (4 mg) by mouth every 6 hours as needed for nausea or vomiting 15 tablet 0     prochlorperazine (COMPAZINE) 10 MG tablet Take 1 tablet (10 mg) by mouth every 6 hours as needed (Nausea/Vomiting) 30 tablet 2     DRUG ALLERGIES No Known Allergies      ONCOLOGIC HISTORY  -7/2022 PRESENTATION: Conductive aphasia, seizure activity.  -7/19/2022 MR brain imaging demonstrated a heterogeneously enhancing mixed solid and cystic mass in the left parietal lobe. Mild local mass effect and no midline shift.   -7/20/2022 SURGERY: Craniotomy with 5ALA-guided resection by Dr. Reese.   PATHOLOGY: Glioblastoma; IDH1, IDH2 wildtype. MGMT promoter unmethylated.    BRAF, PTEN, TP53 not mutated.   This infiltrating glioma shows palisading tumor necrosis and microvascular proliferation.  Post-operative MR brain imaging demonstrated gross total resection.  -8/12/2022 NEURO-ONC: Recommending upfront chemoradiotherapy on or off a clinical trial.   -8/22/2022 NEURO-ONC/ CLINICAL TRIAL: Clinically with numbness in the right leg. Screening labs. Planning MRI tomorrow.   -9/6 - 10/17/2022 CHEMORADS: 60cGy with concurrent temozolomide 160 mg daily and enzastaurin 250 mg daily vs placebo.  -9/6/22 NEURO-ONC: Started treatment per the DENOVO clinical trial.   -10/18/22 NEURO-ONC/ CHEMO: Completed upfront treatment per DENOVO clinical trial protocol. Will continue enzastaurin daily vs placebo.  -11/15/2022 NEURO-ONC/ MRB/ CLINICAL TRAIL: Clinically well. Imaging with no concerns. Starting adjuvant temozolomide 150mg/m2 (300mg) +/- Enzastaurin 500mg daily, cycle 1 (start date 11/16).   -12/9/2022 ED visit: New pleuritic pain thought to be s/t pneumonia. Recommendation for doxycycline. Reportedly no  "evidence for PE on subsequent CT.  -12/12/2022 NEURO-ONC/CLINICAL TRIAL: Clinically left pleuritic pain is better. Neurologically stable.  Plan to increase adjuvant temozolomide to 200mg/m2 (420mg) +/- Enzastaurin 500mg daily, cycle 2 (start date 12/14).  -Transitioned care to [facility]-    -4/3/2023 MR brain imaging with Increased enhancement and edema as described in relation to the margins of the resection cavity.  -4/19/2023 SURGERY: Repeat craniotomy by Dr. Reese.   Post-operative imaging from 4/20 demonstrated a gross total resection of left angular gyrus lesion with expected early postoperative changes.  PATHOLOGY: Recurrent glioblastoma.   While there are significant treatment related changes in the tissue examined, there are also markedly atypical cells which are labeled by Ki-67, consistent with proliferating tumor cells of a recurrent glioblastoma.  -5/1/2023 NEURO-ONC: Recommending second-line lomustine. Matt exploring treatment options on a clinical trial.        PHYSICAL EXAMINATION  There were no vitals taken for this visit.   Wt Readings from Last 2 Encounters:   04/20/23 84.4 kg (186 lb 1.1 oz)   12/12/22 87.5 kg (192 lb 13.6 oz)      Ht Readings from Last 2 Encounters:   04/19/23 1.829 m (6')   11/15/22 1.82 m (5' 11.65\")     KPS: 70    General: Well-appearing, no acute distress.  Psych: Affect appropriate. Pleasant.   Neurologic:   MENTAL STATUS:     Alert.   Speech fragmented, difficulty at times conveying meaning. Word-finding issues present.    Comprehension intact.     CRANIAL NERVES:     Pupils are equal, round.    Symmetric facial movements.   Hearing intact.   No dysarthria.    The rest of a comprehensive physical examination is deferred due to PHE (public health emergency) video visit restrictions.        MEDICAL RECORDS  Personally reviewed inpatient notes from [facility].    LABS  Personally reviewed all available lab results; CBC, CMP, pathology report.    IMAGING  Personally reviewed pre- " (left, cancer in yellow) and post- (right, resection cavity in red) operative MR brain imaging. There was a gross total resection of left angular gyrus lesion with expected early postoperative changes.        Imaging was shown to and results were reviewed with Matt and Tracey.       IMPRESSION  Clinic time of 40 minutes reviewing data, in evaluation, and coordinating care for this high complexity visit was spent discussing in detail the nature of his cancer in light of his recent surgery. This was in addition to answering questions pertaining to my recommendations and devising the plan as outlined below.      Clinically since surgery, Matt is noting significant issues with aphasia. He is on a tapering dose of dexamethasone.      Today, we reviewed the pathology results from his surgery that confirmed recurrent cancer. However, there was significant treatment effect noted. Next, we reviewed the post-operative imaging as detailed above that demonstrated a gross total resection. I have discussed Matt's case and imaging at Brain Tumor Conference and with Dr. Reese.      In the setting of cancer recurrence following surgical resection, there can be consideration for a repeat course of radiation. However, given the success of the surgery, my recommendation is to hold on this treatment option for now.     Since cancer recurrence was noted while Matt was receiving treatment on temozolomide, my recommendation is to discontinue this chemotherapy. For his next line of chemotherapy, we discussed the risks/ side effects of lomustine. Common anticipated chemotherapy-related side effects include nausea, fatigue, and hematologic intolerance. There may be reduce efficacy given his MGMT promoter unmethylated status, but this remains a very reasonable next line of therapy, especially considering recent pathology showing marked treatment effect to an alkylating agent.     Finally today, we also spent time discussing the options for  treatment on a recurrent clinical trial. Matt has a RN coordinator with the [facility] and was provided a list of possible studies. I personally do not have a study that he would be a candidate for. As a result, I encouraged Matt and Tracey to reach out to the institutions that have these studies to inquire more about potential enrollment. I also encouraged Matt to keep me updated on his plans, especially if his decision is to follow here on standard second-line therapy.     PROBLEM LIST  Glioblastoma  Aphasia    PLAN  -CANCER DIRECTED THERAPY-  -As above.     -STEROIDS-  -Continue to taper off dexamethasone.    -SEIZURE MANAGEMENT-  -Given history of seizures, will continue current antiepileptic regimen of Keppra for the foreseeable future.  -On Keppra.    -Quality of life/ MOOD/ FATIGUE-  -History of anxiety.   -Continue to monitor mood as untreated/ undertreated depression can worsen fatigue, dysorexia, and quality of life.    Return to clinic as needed.      Shirin Stone MD  Neuro-oncology

## 2023-05-01 NOTE — PROGRESS NOTES
Radiation Oncology Follow-up Visit  2023    Laz Stein  MRN: [id number]   : 1970     DISEASE TREATED:   Glioblastoma, IDH wild type, MGMT promoter unethylated    RADIATION THERAPY DELIVERED:   6,000 cGy completed 10/17/2022    INTERVAL SINCE COMPLETION OF RADIATION THERAPY:   8 months    SUBJECTIVE:   Matt Stein is a 53 year old gentleman from [place], [place] with an IDH wild type, MGMT promoter unmethylated, glioblastoma of the left parietal lobe status post GTR (22) followed by adjuvant chemoradiation (completed 10/17/22), enrolled on the DENOVO clinical trial, and adjuvant temozolomide with recurrence in 2023 on imaging demonstrating increased enhancement and edema along the margins of the resection cavity, with repeat craniotomy by Dr. Reese on 23 demonstrating recurrence. Post-operative imaging was consistent with gross total resection of the left angular gyrus lesion. He returns today as a video follow-up.    He denies any headaches or neurological symptoms. His incision is healing well, and notes no issues with the skin. His energy level is at his baseline. His biggest symptom right now is ongoing expressive aphasia that he says fluctuates in severity throughout the day, and is frustrating. He does not know if his cognition is affected mainly because of the difficulty with word-finding, but he is able to perform all his daily tasks without issue. He is currently taking his Keppra and also tolerating his steroid taper, currently 2 1-mg tablets daily.     ROS:  Complete review of systems is negative except for symptoms discussed in subjective above.    Current Outpatient Medications   Medication     acetaminophen (TYLENOL) 325 MG tablet     cyclobenzaprine (FLEXERIL) 10 MG tablet     dexamethasone (DECADRON) 1 MG tablet     levETIRAcetam (KEPPRA) 1000 MG tablet     ondansetron (ZOFRAN ODT) 4 MG ODT tab     oxyCODONE (ROXICODONE) 5 MG tablet     polyethylene glycol (MIRALAX)  17 GM/Dose powder     prochlorperazine (COMPAZINE) 10 MG tablet     senna-docusate (SENOKOT-S/PERICOLACE) 8.6-50 MG tablet     No current facility-administered medications for this visit.        No Known Allergies    Past Medical History:   Diagnosis Date     Glioblastoma (H)      HTN (hypertension)      PONV (postoperative nausea and vomiting)      PHYSICAL EXAM:  There were no vitals taken for this visit. *video visit  Gen: Alert, in NAD  Neurologically intact throughout.  No deficits noted.  Skin: Normal color and turgor  Psychiatric: Appropriate mood and affect    LABS AND IMAGING:  MR BRAIN W/O & W CONTRAST 4/20/2023 12:54 PM     History: s/p crani for tumor     Comparison:  4/19/2023      Technique: Sagittal T1-weighted and axial fat-saturated T2-weighted,  fat-saturated T2 FLAIR, susceptibility weighted images and  diffusion-weighted images with ADC map of the brain were obtained  without intravenous contrast.     Findings: Early postoperative changes of gross total resection of left  angular gyrus tumor. Surgical resection cavity is filled with layering  blood products. Minimal communicating extra-axial hemorrhage  underneath the cranioplasty. T2 hyperintense signal anterior to  resection margin is unchanged compared to preoperative scan. No new  infarct or hemorrhage elsewhere in the brain. No hydrocephalus.     Normal orbits. Clear paranasal sinuses. Normal soft tissues apart from  the tissues above the cranioplasty.                                                                      IMPRESSION: Gross total resection of left angular gyrus lesion with  expected early postoperative changes. No evidence of acute  intracranial pathology elsewhere in the brain.     DAVID GAMA MD     ASSESSMENT/PLAN:   Matt Stein is a 53 year old gentleman from [place], [place] with an IDH wild type, MGMT promoter unmethylated, glioblastoma of the left parietal lobe status post GTR (7/19/22) followed by adjuvant  chemoradiation (completed 10/17/22), enrolled on the DENOVO clinical trial, and adjuvant temozolomide with recurrence in April 2023 on imaging demonstrating increased enhancement and edema along the margins of the resection cavity, with repeat craniotomy by Dr. Reese on 4/19/23 with pathology demonstrating recurrence. He has recuperated well from treatment related side effects. Post-operatively he continues to have expressive aphasia. Post-operative imaging on 4/20/23 is consistent with gross total resection of the recurrence.      He inquired about Optune today, as well as further molecular pathology on the most recent surgical pathology from 4/19/23. We discussed Optune would be at Dr. Stone's discretion, and that Dr. Reese may provide further insight on the role of reassessing molecular pathology, and he will see Dr. Reese today in follow up.    He may follow up with Dr. Petit in 3 months in coordination with imaging per Dr. Stone.    The patient was seen and assessed by my attending, Dr. Petit, who agrees with the above assessment and plan.    Cherelle Brantley MD PGY4  Department of Radiation Oncology  [phone] [facility]  [phone] Pager    I saw via video the patient with the resident.  I agree with the resident note and plan of care.      Oly Petit MD

## 2023-05-01 NOTE — NURSING NOTE
Is the patient currently in the state of [place]? YES    Visit mode:VIDEO    If the visit is dropped, the patient can be reconnected by: VIDEO VISIT: Text to cell phone: [phone]    Will anyone else be joining the visit? YES: How would they like to receive their invitation?       How would you like to obtain your AVS? MyChart    Are changes needed to the allergy or medication list? NO    Patient declined individual allergy and medication review by support staff because pt states nothing as changed since last reviewed on April 19th and April 20th 2023.    Reason for visit: Video Visit    Dirk WELLS

## 2023-05-01 NOTE — LETTER
"    5/1/2023         RE: Laz Stein  [address]  [place] [address]        Dear Colleague,    Thank you for referring your patient, Laz Stein, to the [facility]. Please see a copy of my visit note below.    Virtual Visit Details  Type of service:  Video Visit     Start time: 4:40 PM  End time: 5:06 PM    Originating Location (pt. Location): Home  Distant Location (provider location):  On-site  Platform used for Video Visit: Fotofeedback     __________________________________________________________________    NEURO-ONCOLOGY VISIT  May 1, 2023    CHIEF COMPLAINT: Mr. 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 He has since completed upfront chemoradiotherapy per the Denovo clinical trial (Randomized, Double-Blind, Placebo-Controlled Phase 3 Study of Enzastaurin Added to Temozolomide During and Following Radiation Therapy in Newly Diagnosed Glioblastoma Patients Who Possess the Novel Genomic Biomarker DGM1) as of 10/17/2022.    He then started cycle 1 of the adjuvant phase of the trial with temozolomide +/- Enzastaurin on 11/16/2022. Treatment was continued until 3/2023 when imaging demonstrated changes that were concerning for cancer recurrence. On 4/19/2023, Matt underwent a second craniotomy and the resulting pathology was consistent with cancer recurrence, however, there was significant treatment effect noted.     Matt presents today in follow-up accompanied by Tracey (wife).      HISTORY OF PRESENT ILLNESS  -Matt has recovered well from surgery, but continues to have much difficulty with word-finding. Word-finding is more challenging when fatigued or frustrated. Denies headaches/ head pain.   -Mild numbness of the right leg since surgery.   -No vision changes, dizziness, or focal weakness.      MEDICATIONS   Current " Outpatient Medications   Medication Sig Dispense Refill    dexamethasone (DECADRON) 1 MG tablet Take 4 tablets (4 mg) by mouth every 8 hours for 7 days, THEN 3 tablets (3 mg) every 8 hours for 7 days, THEN 2 tablets (2 mg) every 8 hours for 7 days, THEN 1 tablet (1 mg) every 8 hours for 7 days, THEN 0.5 tablets (0.5 mg) every 8 hours for 7 days. 221 tablet 0    levETIRAcetam (KEPPRA) 1000 MG tablet Take 1 tablet (1,000 mg) by mouth 2 times daily 60 tablet 11    ondansetron (ZOFRAN ODT) 4 MG ODT tab Take 1 tablet (4 mg) by mouth every 6 hours as needed for nausea or vomiting 15 tablet 0    prochlorperazine (COMPAZINE) 10 MG tablet Take 1 tablet (10 mg) by mouth every 6 hours as needed (Nausea/Vomiting) 30 tablet 2     DRUG ALLERGIES No Known Allergies      ONCOLOGIC HISTORY  -7/2022 PRESENTATION: Conductive aphasia, seizure activity.  -7/19/2022 MR brain imaging demonstrated a heterogeneously enhancing mixed solid and cystic mass in the left parietal lobe. Mild local mass effect and no midline shift.   -7/20/2022 SURGERY: Craniotomy with 5ALA-guided resection by Dr. Reese.   PATHOLOGY: Glioblastoma; IDH1, IDH2 wildtype. MGMT promoter unmethylated.    BRAF, PTEN, TP53 not mutated.   This infiltrating glioma shows palisading tumor necrosis and microvascular proliferation.  Post-operative MR brain imaging demonstrated gross total resection.  -8/12/2022 NEURO-ONC: Recommending upfront chemoradiotherapy on or off a clinical trial.   -8/22/2022 NEURO-ONC/ CLINICAL TRIAL: Clinically with numbness in the right leg. Screening labs. Planning MRI tomorrow.   -9/6 - 10/17/2022 CHEMORADS: 60cGy with concurrent temozolomide 160 mg daily and enzastaurin 250 mg daily vs placebo.  -9/6/22 NEURO-ONC: Started treatment per the DENOVO clinical trial.   -10/18/22 NEURO-ONC/ CHEMO: Completed upfront treatment per DENOVO clinical trial protocol. Will continue enzastaurin daily vs placebo.  -11/15/2022 NEURO-ONC/ MRB/ CLINICAL TRAIL:  "Clinically well. Imaging with no concerns. Starting adjuvant temozolomide 150mg/m2 (300mg) +/- Enzastaurin 500mg daily, cycle 1 (start date 11/16).   -12/9/2022 ED visit: New pleuritic pain thought to be s/t pneumonia. Recommendation for doxycycline. Reportedly no evidence for PE on subsequent CT.  -12/12/2022 NEURO-ONC/CLINICAL TRIAL: Clinically left pleuritic pain is better. Neurologically stable.  Plan to increase adjuvant temozolomide to 200mg/m2 (420mg) +/- Enzastaurin 500mg daily, cycle 2 (start date 12/14).  -Transitioned care to [facility]-    -4/3/2023 MR brain imaging with Increased enhancement and edema as described in relation to the margins of the resection cavity.  -4/19/2023 SURGERY: Repeat craniotomy by Dr. Reese.   Post-operative imaging from 4/20 demonstrated a gross total resection of left angular gyrus lesion with expected early postoperative changes.  PATHOLOGY: Recurrent glioblastoma.   While there are significant treatment related changes in the tissue examined, there are also markedly atypical cells which are labeled by Ki-67, consistent with proliferating tumor cells of a recurrent glioblastoma.  -5/1/2023 NEURO-ONC: Recommending second-line lomustine. Matt exploring treatment options on a clinical trial.        PHYSICAL EXAMINATION  There were no vitals taken for this visit.   Wt Readings from Last 2 Encounters:   04/20/23 84.4 kg (186 lb 1.1 oz)   12/12/22 87.5 kg (192 lb 13.6 oz)      Ht Readings from Last 2 Encounters:   04/19/23 1.829 m (6')   11/15/22 1.82 m (5' 11.65\")     KPS: 70    General: Well-appearing, no acute distress.  Psych: Affect appropriate. Pleasant.   Neurologic:   MENTAL STATUS:     Alert.   Speech fragmented, difficulty at times conveying meaning. Word-finding issues present.    Comprehension intact.     CRANIAL NERVES:     Pupils are equal, round.    Symmetric facial movements.   Hearing intact.   No dysarthria.    The rest of a comprehensive physical examination is " deferred due to PHE (public health emergency) video visit restrictions.        MEDICAL RECORDS  Personally reviewed inpatient notes from [facility].    LABS  Personally reviewed all available lab results; CBC, CMP, pathology report.    IMAGING  Personally reviewed pre- (left, cancer in yellow) and post- (right, resection cavity in red) operative MR brain imaging. There was a gross total resection of left angular gyrus lesion with expected early postoperative changes.        Imaging was shown to and results were reviewed with Matt and Tracey.       IMPRESSION  Clinic time of 40 minutes reviewing data, in evaluation, and coordinating care for this high complexity visit was spent discussing in detail the nature of his cancer in light of his recent surgery. This was in addition to answering questions pertaining to my recommendations and devising the plan as outlined below.      Clinically since surgery, Matt is noting significant issues with aphasia. He is on a tapering dose of dexamethasone.      Today, we reviewed the pathology results from his surgery that confirmed recurrent cancer. However, there was significant treatment effect noted. Next, we reviewed the post-operative imaging as detailed above that demonstrated a gross total resection. I have discussed Matt's case and imaging at Brain Tumor Conference and with Dr. Reese.      In the setting of cancer recurrence following surgical resection, there can be consideration for a repeat course of radiation. However, given the success of the surgery, my recommendation is to hold on this treatment option for now.     Since cancer recurrence was noted while Matt was receiving treatment on temozolomide, my recommendation is to discontinue this chemotherapy. For his next line of chemotherapy, we discussed the risks/ side effects of lomustine. Common anticipated chemotherapy-related side effects include nausea, fatigue, and hematologic intolerance. There may be reduce efficacy  given his MGMT promoter unmethylated status, but this remains a very reasonable next line of therapy, especially considering recent pathology showing marked treatment effect to an alkylating agent.     Finally today, we also spent time discussing the options for treatment on a recurrent clinical trial. Matt has a RN coordinator with the [facility] and was provided a list of possible studies. I personally do not have a study that he would be a candidate for. As a result, I encouraged Matt and Tracey to reach out to the institutions that have these studies to inquire more about potential enrollment. I also encouraged Matt to keep me updated on his plans, especially if his decision is to follow here on standard second-line therapy.     PROBLEM LIST  Glioblastoma  Aphasia    PLAN  -CANCER DIRECTED THERAPY-  -As above.     -STEROIDS-  -Continue to taper off dexamethasone.    -SEIZURE MANAGEMENT-  -Given history of seizures, will continue current antiepileptic regimen of Keppra for the foreseeable future.  -On Keppra.    -Quality of life/ MOOD/ FATIGUE-  -History of anxiety.   -Continue to monitor mood as untreated/ undertreated depression can worsen fatigue, dysorexia, and quality of life.    Return to clinic as needed.      Shirin Stone MD  Neuro-oncology

## 2023-05-05 ENCOUNTER — VIRTUAL VISIT (OUTPATIENT)
Dept: NEUROSURGERY | Facility: CLINIC | Age: 53
End: 2023-05-05
Attending: NEUROLOGICAL SURGERY
Payer: COMMERCIAL

## 2023-05-05 ENCOUNTER — VIRTUAL VISIT (OUTPATIENT)
Dept: RADIATION ONCOLOGY | Facility: CLINIC | Age: 53
End: 2023-05-05
Attending: RADIOLOGY
Payer: COMMERCIAL

## 2023-05-05 DIAGNOSIS — C71.9 GLIOBLASTOMA (H): Primary | ICD-10-CM

## 2023-05-05 NOTE — LETTER
2023         RE: Laz Stein  [address]  [place] [facility] [address]        Dear Colleague,    Thank you for referring your patient, Laz Stein, to the [facility] RADIATION ONCOLOGY. Please see a copy of my visit note below.    Radiation Oncology Follow-up Visit  2023    Laz Stein  MRN: [id number]   : 1970     DISEASE TREATED:   Glioblastoma, IDH wild type, MGMT promoter unethylated    RADIATION THERAPY DELIVERED:   6,000 cGy completed 10/17/2022    INTERVAL SINCE COMPLETION OF RADIATION THERAPY:   8 months    SUBJECTIVE:   Matt Stein is a 53 year old gentleman from [place], [place] with an IDH wild type, MGMT promoter unmethylated, glioblastoma of the left parietal lobe status post GTR (22) followed by adjuvant chemoradiation (completed 10/17/22), enrolled on the DENOVO clinical trial, and adjuvant temozolomide with recurrence in 2023 on imaging demonstrating increased enhancement and edema along the margins of the resection cavity, with repeat craniotomy by Dr. Reese on 23 demonstrating recurrence. Post-operative imaging was consistent with gross total resection of the left angular gyrus lesion. He returns today as a video follow-up.    He denies any headaches or neurological symptoms. His incision is healing well, and notes no issues with the skin. His energy level is at his baseline. His biggest symptom right now is ongoing expressive aphasia that he says fluctuates in severity throughout the day, and is frustrating. He does not know if his cognition is affected mainly because of the difficulty with word-finding, but he is able to perform all his daily tasks without issue. He is currently taking his Keppra and also tolerating his steroid taper, currently 2 1-mg tablets daily.     ROS:  Complete review of systems is negative except for symptoms discussed in subjective above.    Current Outpatient Medications   Medication    acetaminophen  (TYLENOL) 325 MG tablet    cyclobenzaprine (FLEXERIL) 10 MG tablet    dexamethasone (DECADRON) 1 MG tablet    levETIRAcetam (KEPPRA) 1000 MG tablet    ondansetron (ZOFRAN ODT) 4 MG ODT tab    oxyCODONE (ROXICODONE) 5 MG tablet    polyethylene glycol (MIRALAX) 17 GM/Dose powder    prochlorperazine (COMPAZINE) 10 MG tablet    senna-docusate (SENOKOT-S/PERICOLACE) 8.6-50 MG tablet     No current facility-administered medications for this visit.        No Known Allergies    Past Medical History:   Diagnosis Date    Glioblastoma (H)     HTN (hypertension)     PONV (postoperative nausea and vomiting)      PHYSICAL EXAM:  There were no vitals taken for this visit. *video visit  Gen: Alert, in NAD  Neurologically intact throughout.  No deficits noted.  Skin: Normal color and turgor  Psychiatric: Appropriate mood and affect    LABS AND IMAGING:  MR BRAIN W/O & W CONTRAST 4/20/2023 12:54 PM     History: s/p crani for tumor     Comparison:  4/19/2023      Technique: Sagittal T1-weighted and axial fat-saturated T2-weighted,  fat-saturated T2 FLAIR, susceptibility weighted images and  diffusion-weighted images with ADC map of the brain were obtained  without intravenous contrast.     Findings: Early postoperative changes of gross total resection of left  angular gyrus tumor. Surgical resection cavity is filled with layering  blood products. Minimal communicating extra-axial hemorrhage  underneath the cranioplasty. T2 hyperintense signal anterior to  resection margin is unchanged compared to preoperative scan. No new  infarct or hemorrhage elsewhere in the brain. No hydrocephalus.     Normal orbits. Clear paranasal sinuses. Normal soft tissues apart from  the tissues above the cranioplasty.                                                                      IMPRESSION: Gross total resection of left angular gyrus lesion with  expected early postoperative changes. No evidence of acute  intracranial pathology elsewhere in the  brain.     DAVID GAMA MD     ASSESSMENT/PLAN:   Matt Stein is a 53 year old gentleman from [place], [place] with an IDH wild type, MGMT promoter unmethylated, glioblastoma of the left parietal lobe status post GTR (22) followed by adjuvant chemoradiation (completed 10/17/22), enrolled on the DENOVO clinical trial, and adjuvant temozolomide with recurrence in 2023 on imaging demonstrating increased enhancement and edema along the margins of the resection cavity, with repeat craniotomy by Dr. Reese on 23 with pathology demonstrating recurrence. He has recuperated well from treatment related side effects. Post-operatively he continues to have expressive aphasia. Post-operative imaging on 23 is consistent with gross total resection of the recurrence.      He inquired about Optune today, as well as further molecular pathology on the most recent surgical pathology from 23. We discussed Optune would be at Dr. Stone's discretion, and that Dr. Reese may provide further insight on the role of reassessing molecular pathology, and he will see Dr. Reese today in follow up.    He may follow up with Dr. Petit in 3 months in coordination with imaging per Dr. Stone.    The patient was seen and assessed by my attending, Dr. Petit, who agrees with the above assessment and plan.    Cherelle Brantley MD PGY4  Department of Radiation Oncology  [phone] Clinic  [phone] Pager    I saw via video the patient with the resident.  I agree with the resident note and plan of care.      Oly Petit MD      FOLLOW-UP VISIT    Patient Name: Laz Stein      : 1970     Age: 53 year old        ______________________________________________________________________________     Chief Complaint   Patient presents with    Radiation Therapy     Follow up to radiation therapy      There were no vitals taken for this visit.     Date Radiation Completed: Glioblastoma:Partial brain 6000 cGy  completed 10/17/22    Pain  Denies    Labs  Other Labs: Yes: 4/20/23    Imaging  MRI: 4/20/23      Other Appointment Notes: expressive aphasia, new since surgery. Reporting mild neuropathy in bilateral legs states since chemo    Residual Radiation side effect: denies     Additional Instructions: Follow up in 6 months with Dr. Calix     Virtual Visit Details    Type of service:  Video Visit     Originating Location (pt. Location): Other drove to [place] for video visit  Distant Location (provider location):  On-site  Platform used for Video Visit: AmWell            Again, thank you for allowing me to participate in the care of your patient.        Sincerely,        Oly Petit MD

## 2023-05-05 NOTE — PROGRESS NOTES
FOLLOW-UP VISIT    Patient Name: Laz Stein      : 1970     Age: 53 year old        ______________________________________________________________________________     Chief Complaint   Patient presents with     Radiation Therapy     Follow up to radiation therapy      There were no vitals taken for this visit.     Date Radiation Completed: Glioblastoma:Partial brain 6000 cGy completed 10/17/22    Pain  Denies    Labs  Other Labs: Yes: 23    Imaging  MRI: 23      Other Appointment Notes: expressive aphasia, new since surgery. Reporting mild neuropathy in bilateral legs states since chemo    Residual Radiation side effect: denies     Additional Instructions: Follow up in 6 months with Dr. Calix     Virtual Visit Details    Type of service:  Video Visit     Originating Location (pt. Location): Other drove to [place] for video visit  Distant Location (provider location):  On-site  Platform used for Video Visit: Omar

## 2023-05-05 NOTE — NURSING NOTE
Is the patient currently in the state of [place]? YES    Visit mode:VIDEO    If the visit is dropped, the patient can be reconnected by: VIDEO VISIT: Send to e-mail at: [email or web address]    Will anyone else be joining the visit? NO      How would you like to obtain your AVS? MyChart    Are changes needed to the allergy or medication list? NO    Reason for visit: Video Visit and Follow Up    Patient declined individual allergy and medication review by support staff because they deny any changes and state that all information remains accurate since last reviewed/verified.    Tiana Butler VF

## 2023-05-05 NOTE — PROGRESS NOTES
Virtual Visit Details    Type of service:  Video Visit     Originating Location (pt. Location): Home  Distant Location (provider location):  On-site  Platform used for Video Visit: [facility]     Neurosurgery Clinic Note    53 M s/p GTR of a recurrent left parietal glioblastoma. While the procedure was performed without complications, the patient did suffer worsened progressive aphasia as a result of the region that was removed. After clearance by PT/OT, the patient was discharged on POD1. The pathology revealed findings consistent with recurrent glioblastoma.     On review of systems, the wife, Tracey, reports that the patient had significantly improved in terms of fluency. However, the patient feels that he is not back to his baseline. The patient did report left-sided chest pain three days prior to presentation, but reports spontaneously resolution    On video, the patient is awake, alert, and appropriate. No gross somatic or cranial motor deficit. Speech is fluent in about 70% of the times, especially when the patient is relaxed.    AP: 53 M who is doing well from a post-operative perspective. I believe that his speech will continue to improve. We discussed the various treatment options today, and I had encouraged the patient to consider clinical trial options at this time. I will continue to follow the patient closely.

## 2023-05-05 NOTE — LETTER
5/5/2023         RE: Laz Stein  [address]  [place] [facility] [address]        Dear Colleague,    Thank you for referring your patient, Laz Stein, to the [facility] [place] [facility]. Please see a copy of my visit note below.    Virtual Visit Details    Type of service:  Video Visit     Originating Location (pt. Location): Home  Distant Location (provider location):  On-site  Platform used for Video Visit: [facility]     Neurosurgery Clinic Note    53 M s/p GTR of a recurrent left parietal glioblastoma. While the procedure was performed without complications, the patient did suffer worsened progressive aphasia as a result of the region that was removed. After clearance by PT/OT, the patient was discharged on POD1. The pathology revealed findings consistent with recurrent glioblastoma.     On review of systems, the wife, Tracey, reports that the patient had significantly improved in terms of fluency. However, the patient feels that he is not back to his baseline. The patient did report left-sided chest pain three days prior to presentation, but reports spontaneously resolution    On video, the patient is awake, alert, and appropriate. No gross somatic or cranial motor deficit. Speech is fluent in about 70% of the times, especially when the patient is relaxed.    AP: 53 M who is doing well from a post-operative perspective. I believe that his speech will continue to improve. We discussed the various treatment options today, and I had encouraged the patient to consider clinical trial options at this time. I will continue to follow the patient closely.        Again, thank you for allowing me to participate in the care of your patient.        Sincerely,        Tanner Reese MD

## 2023-05-08 ENCOUNTER — MYC MEDICAL ADVICE (OUTPATIENT)
Dept: ONCOLOGY | Facility: CLINIC | Age: 53
End: 2023-05-08
Payer: COMMERCIAL

## 2023-05-11 ENCOUNTER — PATIENT OUTREACH (OUTPATIENT)
Dept: ONCOLOGY | Facility: CLINIC | Age: 53
End: 2023-05-11
Payer: COMMERCIAL

## 2023-05-12 NOTE — TUMOR CONFERENCE
Tumor Conference Information  Tumor Conference: Neuro-Onc  Specialties Present: Medical oncology, Radiation oncology, Pathology, Radiology, Surgery  Patient Status: Prospective  Stage: recurrent GBM  Treatment to Date: Surgery, Chemotherapy, Radiation  Clinical Trials: Discussed (see comment) (Comment: exploring trials through brain trial network)  Genetic Testing Discussed/Recommended?: No  Supportive Care Services Discussed/Recommended?: No  Recommended Plan: Follows evidence-based guidelines (Comment: recurrent GBM, offer additional chemo and pt is exploring clinical trial options available)  Did the review exceed 30 minutes?: did not           Documentation / Disclaimer Cancer Tumor Board Note  Cancer tumor board recommendations do not override what is determined to be reasonable care and treatment, which is dependent on the circumstances of a patient's case; the patient's medical, social, and personal concerns; and the clinical judgment of the oncologist [physician].

## 2023-06-07 LAB — SPECIMEN STATUS: NORMAL

## 2023-07-13 ENCOUNTER — TELEPHONE (OUTPATIENT)
Dept: NEUROLOGY | Facility: CLINIC | Age: 53
End: 2023-07-13
Payer: COMMERCIAL

## 2023-07-13 NOTE — TELEPHONE ENCOUNTER
----- Message from Dre Wilkinson sent at 7/13/2023  9:15 AM CDT -----  Regarding: FW: MRI  Good morning    Can you please help- MRI @ [facility].  ----- Message -----  From: Tanner Reese MD  Sent: 7/12/2023   3:07 PM CDT  To: Dre Wilkinson  Subject: TAMMY Erickson,  Mr. Stein called me to let me know that he underwent a new MRI. Can you upload these images into our system so that I can review?

## 2023-08-31 ENCOUNTER — TELEPHONE (OUTPATIENT)
Dept: NEUROSURGERY | Facility: CLINIC | Age: 53
End: 2023-08-31
Payer: COMMERCIAL

## 2023-08-31 NOTE — TELEPHONE ENCOUNTER
----- Message from Dre Wilkinson sent at 8/30/2023  9:21 AM CDT -----  Regarding: FW: MRI  Angus Solorio    Imaging is at [facility]. Can you please help?  ----- Message -----  From: Tanner Reese MD  Sent: 8/28/2023   1:55 PM CDT  To: Dre Wilkinson  Subject: MRI                                              Angus Erickson,  Can you have one of our team members upload Mr. Stein's most recent MRI so that I can evaluate?  Thank you in advance for your help.  CC

## 2023-09-09 ENCOUNTER — HEALTH MAINTENANCE LETTER (OUTPATIENT)
Age: 53
End: 2023-09-09

## 2024-01-05 ENCOUNTER — TRANSFERRED RECORDS (OUTPATIENT)
Dept: HEALTH INFORMATION MANAGEMENT | Facility: CLINIC | Age: 54
End: 2024-01-05

## 2024-01-30 ENCOUNTER — TRANSFERRED RECORDS (OUTPATIENT)
Dept: HEALTH INFORMATION MANAGEMENT | Facility: CLINIC | Age: 54
End: 2024-01-30

## 2024-02-08 ENCOUNTER — TRANSFERRED RECORDS (OUTPATIENT)
Dept: HEALTH INFORMATION MANAGEMENT | Facility: CLINIC | Age: 54
End: 2024-02-08
Payer: COMMERCIAL

## 2024-02-18 NOTE — NURSING NOTE
"Oncology Rooming Note    September 21, 2022 9:46 AM   Laz Stein is a 52 year old male who presents for:    Chief Complaint   Patient presents with     Oncology Clinic Visit     Glioblastoma (H)     Initial Vitals: /83 (BP Location: Left arm, Patient Position: Sitting, Cuff Size: Adult Regular)   Pulse 75   Temp 98.2  F (36.8  C) (Oral)   Resp 16   Wt 88 kg (194 lb 1.6 oz)   SpO2 95%   BMI 25.86 kg/m   Estimated body mass index is 25.86 kg/m  as calculated from the following:    Height as of 8/12/22: 1.845 m (6' 0.64\").    Weight as of this encounter: 88 kg (194 lb 1.6 oz). Body surface area is 2.12 meters squared.  No Pain (0) Comment: Data Unavailable   No LMP for male patient.  Allergies reviewed: Yes  Medications reviewed: Yes    Medications: Medication refills not needed today.  Pharmacy name entered into [employer]: [facility] - [place], [place] - [address]    Clinical concerns: Patient reports discomfort at the back of his head-applied cream for relief-please discuss. Occasional gas and constipation. Patient states that he is eating ok and feels good.       Evy Hein LPN September 21, 2022 9:47 AM              "
18-Feb-2024 19:42

## 2024-06-20 ENCOUNTER — TRANSFERRED RECORDS (OUTPATIENT)
Dept: HEALTH INFORMATION MANAGEMENT | Facility: CLINIC | Age: 54
End: 2024-06-20
Payer: COMMERCIAL

## 2024-08-06 ENCOUNTER — TRANSFERRED RECORDS (OUTPATIENT)
Dept: HEALTH INFORMATION MANAGEMENT | Facility: CLINIC | Age: 54
End: 2024-08-06
Payer: COMMERCIAL

## 2024-11-02 ENCOUNTER — HEALTH MAINTENANCE LETTER (OUTPATIENT)
Age: 54
End: 2024-11-02

## 2025-08-20 ENCOUNTER — PATIENT OUTREACH (OUTPATIENT)
Dept: ONCOLOGY | Facility: CLINIC | Age: 55
End: 2025-08-20
Payer: COMMERCIAL

## (undated) DEVICE — ESU FCP CODMAN 8"X1.0MM SLIM TIP 9008100SL

## (undated) DEVICE — SYR 30ML LL W/O NDL 302832

## (undated) DEVICE — DRAPE CRANIOTOMY W/POUCH 9450

## (undated) DEVICE — PREP CHLORAPREP CLEAR 3ML 930400

## (undated) DEVICE — SUCTION MANIFOLD NEPTUNE 2 SYS 4 PORT 0702-020-000

## (undated) DEVICE — ESU CORD BIPOLAR GREEN 10-4000

## (undated) DEVICE — GLOVE BIOGEL PI MICRO INDICATOR UNDERGLOVE SZ 7.5 48975

## (undated) DEVICE — SPONGE COTTONOID 1X3" 20-10S

## (undated) DEVICE — SU MONOCRYL 3-0 PS-1 27" Y936H

## (undated) DEVICE — SURGICEL HEMOSTAT 4X8" 1952

## (undated) DEVICE — PREP SKIN SCRUB TRAY 4461A

## (undated) DEVICE — PAD CHUX UNDERPAD 23X24" 7136

## (undated) DEVICE — SPONGE COTTONOID 1/2X1 1/2" 20-06S

## (undated) DEVICE — SOL RINGERS LACTATED 1000ML BAG 07953-09

## (undated) DEVICE — SYR 03ML LL W/O NDL 309657

## (undated) DEVICE — ESU FCP CODMAN 9"X1.0MM VERSATRU 9009100SL

## (undated) DEVICE — RX SURGIFLO HEMOSTATIC MATRIX W/THROMBIN 8ML 2994

## (undated) DEVICE — ESU CORD BIPOLAR AND IRR TUBING AESCULAP US355

## (undated) DEVICE — PACK CRANIOTOMY

## (undated) DEVICE — SU NUROLON 4-0 TF CR 8X18" C584D

## (undated) DEVICE — SU VICRYL 2-0 CT-2 CR 8X18" J726D

## (undated) DEVICE — NDL ANGIOCATH 14GA 1.25" 4048

## (undated) DEVICE — STRAP UNIVERSAL POSITIONING 2-PIECE 4X47X76" 91-287

## (undated) DEVICE — DRAPE MICROSCOPE LEICA 54X120" 09-MK653

## (undated) DEVICE — MARKER SPHERES PASSIVE MEDT PACK 5 8801075

## (undated) DEVICE — ESU GROUND PAD ADULT W/CORD E7507

## (undated) DEVICE — BUR STRK CARBIDE ROUND 5.0MM 5820-110-050C

## (undated) DEVICE — SOL WATER IRRIG 1000ML BOTTLE 2F7114

## (undated) DEVICE — RX BACITRACIN OINTMENT 0.9G 1/32OZ CUR001109

## (undated) DEVICE — CATH TRAY FOLEY SURESTEP 16FR W/TMP PRB STLK LATEX A319416AM

## (undated) DEVICE — BUR STRK 1.1MM STRAIGHT ROUTER 5820-070-011

## (undated) DEVICE — ESU FCP BIPOLAR 20CMX0.5MM SLIM TIP 9008050SL

## (undated) DEVICE — SPONGE COTTONOID 1/4X1/4" 20-01S

## (undated) DEVICE — DRAPE POUCH INSTRUMENT 1018

## (undated) DEVICE — PROTECTOR ARM ONE-STEP TRENDELENBURG 40418

## (undated) DEVICE — GLOVE PROTEXIS MICRO 7.0  2D73PM70

## (undated) DEVICE — LINEN TOWEL PACK X30 5481

## (undated) DEVICE — TUBING SMOKE EVAC 3/8"X10' 0702-045-023

## (undated) DEVICE — SPONGE COTTONOID 1/2X1/2" 20-04S

## (undated) DEVICE — SPONGE SURGIFOAM 100 1974

## (undated) DEVICE — BLADE CLIPPER SGL USE 9680

## (undated) DEVICE — GLOVE BIOGEL PI MICRO SZ 7.0 48570

## (undated) DEVICE — DRSG TEGADERM 4X4 3/4" 1626W

## (undated) DEVICE — LINEN TOWEL PACK X6 WHITE 5487

## (undated) DEVICE — PIN SKULL MAYFIELD ADULT TITANIUM 3/PK A1120

## (undated) DEVICE — DECANTER BAG 2002S

## (undated) DEVICE — SPECIMEN BAG MEDIVAC SUCTION WHITE SOCK 65652-122

## (undated) DEVICE — SPONGE COTTONOID 1/2X3" 20-07S

## (undated) DEVICE — PREP POVIDONE-IODINE 7.5% SCRUB 4OZ BOTTLE MDS093945

## (undated) DEVICE — ADH SKIN CLOSURE PREMIERPRO EXOFIN 1.0ML 3470

## (undated) DEVICE — DRAPE SHEET HALF 40X60" 9358

## (undated) DEVICE — BUR STRK 2.3MM TAPERED ROUTER - FA2 5407-FA2-023

## (undated) DEVICE — PREP POVIDONE IODINE SOLUTION 10% 4OZ BOTTLE 29906-004

## (undated) DEVICE — COVER CAMERA VIDEO LASER 9X96" 04-CC227

## (undated) DEVICE — PREP POVIDONE-IODINE 10% SOLUTION 4OZ BOTTLE MDS093944

## (undated) DEVICE — DRSG PRIMAPORE 03 1/8X6" 66000318

## (undated) DEVICE — WIPES FOLEY CARE SURESTEP PROVON DFC100

## (undated) DEVICE — GLOVE PROTEXIS BLUE W/NEU-THERA 7.5  2D73EB75

## (undated) DEVICE — DRAPE SHEET REV FOLD 3/4 9349

## (undated) RX ORDER — DEXAMETHASONE SODIUM PHOSPHATE 4 MG/ML
INJECTION, SOLUTION INTRA-ARTICULAR; INTRALESIONAL; INTRAMUSCULAR; INTRAVENOUS; SOFT TISSUE
Status: DISPENSED
Start: 2022-07-20

## (undated) RX ORDER — SODIUM CHLORIDE 9 MG/ML
INJECTION, SOLUTION INTRAVENOUS
Status: DISPENSED
Start: 2022-07-20

## (undated) RX ORDER — FENTANYL CITRATE-0.9 % NACL/PF 10 MCG/ML
PLASTIC BAG, INJECTION (ML) INTRAVENOUS
Status: DISPENSED
Start: 2023-04-19

## (undated) RX ORDER — FENTANYL CITRATE 50 UG/ML
INJECTION, SOLUTION INTRAMUSCULAR; INTRAVENOUS
Status: DISPENSED
Start: 2023-04-19

## (undated) RX ORDER — BUPIVACAINE HYDROCHLORIDE AND EPINEPHRINE 2.5; 5 MG/ML; UG/ML
INJECTION, SOLUTION EPIDURAL; INFILTRATION; INTRACAUDAL; PERINEURAL
Status: DISPENSED
Start: 2023-04-19

## (undated) RX ORDER — FENTANYL CITRATE 50 UG/ML
INJECTION, SOLUTION INTRAMUSCULAR; INTRAVENOUS
Status: DISPENSED
Start: 2022-07-20

## (undated) RX ORDER — BUPIVACAINE HYDROCHLORIDE AND EPINEPHRINE 2.5; 5 MG/ML; UG/ML
INJECTION, SOLUTION EPIDURAL; INFILTRATION; INTRACAUDAL; PERINEURAL
Status: DISPENSED
Start: 2022-07-20

## (undated) RX ORDER — APREPITANT 40 MG/1
CAPSULE ORAL
Status: DISPENSED
Start: 2022-07-20

## (undated) RX ORDER — CEFAZOLIN SODIUM/WATER 2 G/20 ML
SYRINGE (ML) INTRAVENOUS
Status: DISPENSED
Start: 2022-07-20

## (undated) RX ORDER — SODIUM CHLORIDE, SODIUM LACTATE, POTASSIUM CHLORIDE, CALCIUM CHLORIDE 600; 310; 30; 20 MG/100ML; MG/100ML; MG/100ML; MG/100ML
INJECTION, SOLUTION INTRAVENOUS
Status: DISPENSED
Start: 2023-04-19

## (undated) RX ORDER — ONDANSETRON 2 MG/ML
INJECTION INTRAMUSCULAR; INTRAVENOUS
Status: DISPENSED
Start: 2023-04-19

## (undated) RX ORDER — ACETAMINOPHEN 500 MG
TABLET ORAL
Status: DISPENSED
Start: 2022-07-20

## (undated) RX ORDER — CEFAZOLIN SODIUM/WATER 2 G/20 ML
SYRINGE (ML) INTRAVENOUS
Status: DISPENSED
Start: 2023-04-19